# Patient Record
Sex: MALE | Race: OTHER | NOT HISPANIC OR LATINO
[De-identification: names, ages, dates, MRNs, and addresses within clinical notes are randomized per-mention and may not be internally consistent; named-entity substitution may affect disease eponyms.]

---

## 2021-01-25 ENCOUNTER — APPOINTMENT (OUTPATIENT)
Dept: INTERNAL MEDICINE | Facility: CLINIC | Age: 61
End: 2021-01-25
Payer: COMMERCIAL

## 2021-01-25 VITALS
OXYGEN SATURATION: 98 % | HEART RATE: 62 BPM | BODY MASS INDEX: 24.55 KG/M2 | WEIGHT: 162 LBS | HEIGHT: 68 IN | TEMPERATURE: 97.3 F

## 2021-01-25 VITALS — DIASTOLIC BLOOD PRESSURE: 80 MMHG | SYSTOLIC BLOOD PRESSURE: 110 MMHG

## 2021-01-25 DIAGNOSIS — N52.9 MALE ERECTILE DYSFUNCTION, UNSPECIFIED: ICD-10-CM

## 2021-01-25 PROCEDURE — 99386 PREV VISIT NEW AGE 40-64: CPT | Mod: 25

## 2021-01-25 PROCEDURE — 93000 ELECTROCARDIOGRAM COMPLETE: CPT

## 2021-01-25 PROCEDURE — 99072 ADDL SUPL MATRL&STAF TM PHE: CPT

## 2021-01-27 NOTE — REVIEW OF SYSTEMS
[Muscle Pain] : muscle pain [Joint Pain] : joint pain [Fever] : no fever [Chills] : no chills [Night Sweats] : no night sweats [Discharge] : no discharge [Chest Pain] : no chest pain [Claudication] : no  leg claudication [Shortness Of Breath] : no shortness of breath [Cough] : no cough [Abdominal Pain] : no abdominal pain [Nausea] : no nausea [Vomiting] : no vomiting [Confusion] : no confusion [Anxiety] : no anxiety [Depression] : no depression [FreeTextEntry9] : Patient complains of hip pains and right shoulder pain

## 2021-01-27 NOTE — HEALTH RISK ASSESSMENT
[Good] : ~his/her~ current health as good [No] : No [1 or 2 (0 pts)] : 1 or 2 (0 points) [Never (0 pts)] : Never (0 points) [No falls in past year] : Patient reported no falls in the past year [0] : 2) Feeling down, depressed, or hopeless: Not at all (0) [Patient reported colonoscopy was normal] : Patient reported colonoscopy was normal [None] : None [With Significant Other] : lives with significant other [# of Members in Household ___] :  household currently consist of [unfilled] member(s) [Unemployed] : unemployed [] :  [Fully functional (bathing, dressing, toileting, transferring, walking, feeding)] : Fully functional (bathing, dressing, toileting, transferring, walking, feeding) [Fully functional (using the telephone, shopping, preparing meals, housekeeping, doing laundry, using] : Fully functional and needs no help or supervision to perform IADLs (using the telephone, shopping, preparing meals, housekeeping, doing laundry, using transportation, managing medications and managing finances) [Reports changes in hearing] : Reports changes in hearing [Smoke Detector] : smoke detector [Carbon Monoxide Detector] : carbon monoxide detector [Seat Belt] :  uses seat belt [] : No [Audit-CScore] : 0 [de-identified] : Walking, weight lifting [de-identified] : None [ESK6Szupd] : 0 [Change in mental status noted] : No change in mental status noted [Language] : denies difficulty with language [Behavior] : denies difficulty with behavior [Learning/Retaining New Information] : denies difficulty learning/retaining new information [Handling Complex Tasks] : denies difficulty handling complex tasks [Reasoning] : denies difficulty with reasoning [Spatial Ability and Orientation] : denies difficulty with spatial ability and orientation [Reports changes in vision] : Reports no changes in vision [Reports normal functional visual acuity (ie: able to read med bottle)] : Reports poor functional visual acuity.  [Reports changes in dental health] : Reports no changes in dental health [Sunscreen] : does not use sunscreen [ColonoscopyComments] : Does not recall [de-identified] : Left swollen ear

## 2021-01-27 NOTE — PHYSICAL EXAM
[Well Developed] : well developed [No Acute Distress] : no acute distress [No JVD] : no jugular venous distention [No Lymphadenopathy] : no lymphadenopathy [No Respiratory Distress] : no respiratory distress  [No Accessory Muscle Use] : no accessory muscle use [Clear to Auscultation] : lungs were clear to auscultation bilaterally [Normal Rate] : normal rate  [Regular Rhythm] : with a regular rhythm [Normal S1, S2] : normal S1 and S2 [Soft] : abdomen soft [Non Tender] : non-tender [No Masses] : no abdominal mass palpated [Normal Gait] : normal gait [Alert and Oriented x3] : oriented to person, place, and time [No Edema] : there was no peripheral edema [Normal Anterior Cervical Nodes] : no anterior cervical lymphadenopathy [No Joint Swelling] : no joint swelling [No Rash] : no rash [No Focal Deficits] : no focal deficits [Normal Affect] : the affect was normal [de-identified] : limited range of motion in right shoulder

## 2021-01-27 NOTE — PLAN
[FreeTextEntry1] : -Orthopedic referrals given \par -ekg nsr no MI no st-t changes\par -Blood taken for routine labs\par -Testosterone levels to be taken before starting medication for erectile dysfunction\par

## 2021-01-27 NOTE — HISTORY OF PRESENT ILLNESS
[Other: _____] : [unfilled] [FreeTextEntry1] : 60 year old male here today for blood pressure check ans well as a few unspecific musculoskeletal complaints including left hip pain, right shoulder pain\par Patient also has complaints of erectile dysfunction  [de-identified] : 60 year old male here for blood pressure reading, erectile dysfunction

## 2021-01-27 NOTE — ASSESSMENT
[FreeTextEntry1] : 60 year old male here today for routine visit and erectile dysfunction as well as right shoulder pain \par No other acute complaints at this time \par On no medications at this time

## 2021-02-11 LAB
ALBUMIN SERPL ELPH-MCNC: 4.1 G/DL
ALP BLD-CCNC: 97 U/L
ALT SERPL-CCNC: 22 U/L
ANION GAP SERPL CALC-SCNC: 14 MMOL/L
AST SERPL-CCNC: 29 U/L
BASOPHILS # BLD AUTO: 0.08 K/UL
BASOPHILS NFR BLD AUTO: 0.9 %
BILIRUB SERPL-MCNC: 1.1 MG/DL
BUN SERPL-MCNC: 14 MG/DL
CALCIUM SERPL-MCNC: 9.5 MG/DL
CHLORIDE SERPL-SCNC: 103 MMOL/L
CHOLEST SERPL-MCNC: 168 MG/DL
CO2 SERPL-SCNC: 21 MMOL/L
CREAT SERPL-MCNC: 1.05 MG/DL
EOSINOPHIL # BLD AUTO: 0.23 K/UL
EOSINOPHIL NFR BLD AUTO: 2.5 %
ESTIMATED AVERAGE GLUCOSE: 82 MG/DL
GLUCOSE SERPL-MCNC: 84 MG/DL
HBA1C MFR BLD HPLC: 4.5 %
HCT VFR BLD CALC: 46.1 %
HDLC SERPL-MCNC: 75 MG/DL
HGB BLD-MCNC: 14.3 G/DL
IMM GRANULOCYTES NFR BLD AUTO: 0.3 %
LDLC SERPL CALC-MCNC: 79 MG/DL
LYMPHOCYTES # BLD AUTO: 2.02 K/UL
LYMPHOCYTES NFR BLD AUTO: 21.8 %
MAN DIFF?: NORMAL
MCHC RBC-ENTMCNC: 28.9 PG
MCHC RBC-ENTMCNC: 31 GM/DL
MCV RBC AUTO: 93.1 FL
MONOCYTES # BLD AUTO: 0.73 K/UL
MONOCYTES NFR BLD AUTO: 7.9 %
NEUTROPHILS # BLD AUTO: 6.19 K/UL
NEUTROPHILS NFR BLD AUTO: 66.6 %
NONHDLC SERPL-MCNC: 92 MG/DL
PLATELET # BLD AUTO: 297 K/UL
POTASSIUM SERPL-SCNC: 4.3 MMOL/L
PROT SERPL-MCNC: 7.5 G/DL
RBC # BLD: 4.95 M/UL
RBC # FLD: 13.2 %
SODIUM SERPL-SCNC: 137 MMOL/L
TESTOST SERPL-MCNC: 554 NG/DL
TRIGL SERPL-MCNC: 69 MG/DL
TSH SERPL-ACNC: 2.04 UIU/ML
WBC # FLD AUTO: 9.28 K/UL

## 2021-05-25 ENCOUNTER — OUTPATIENT (OUTPATIENT)
Dept: OUTPATIENT SERVICES | Facility: HOSPITAL | Age: 61
LOS: 1 days | End: 2021-05-25
Payer: COMMERCIAL

## 2021-05-25 ENCOUNTER — APPOINTMENT (OUTPATIENT)
Dept: ORTHOPEDIC SURGERY | Facility: CLINIC | Age: 61
End: 2021-05-25
Payer: COMMERCIAL

## 2021-05-25 ENCOUNTER — RESULT REVIEW (OUTPATIENT)
Age: 61
End: 2021-05-25

## 2021-05-25 VITALS
BODY MASS INDEX: 25.76 KG/M2 | SYSTOLIC BLOOD PRESSURE: 122 MMHG | DIASTOLIC BLOOD PRESSURE: 80 MMHG | HEIGHT: 68 IN | WEIGHT: 170 LBS

## 2021-05-25 DIAGNOSIS — Z78.9 OTHER SPECIFIED HEALTH STATUS: ICD-10-CM

## 2021-05-25 DIAGNOSIS — M16.12 UNILATERAL PRIMARY OSTEOARTHRITIS, LEFT HIP: ICD-10-CM

## 2021-05-25 DIAGNOSIS — M47.816 SPONDYLOSIS W/OUT MYELOPATHY OR RADICULOPATHY, LUMBAR REGION: ICD-10-CM

## 2021-05-25 DIAGNOSIS — Z47.1 AFTERCARE FOLLOWING JOINT REPLACEMENT SURGERY: ICD-10-CM

## 2021-05-25 DIAGNOSIS — Z96.641 AFTERCARE FOLLOWING JOINT REPLACEMENT SURGERY: ICD-10-CM

## 2021-05-25 DIAGNOSIS — Z72.3 LACK OF PHYSICAL EXERCISE: ICD-10-CM

## 2021-05-25 PROCEDURE — 99204 OFFICE O/P NEW MOD 45 MIN: CPT

## 2021-05-25 PROCEDURE — 72020 X-RAY EXAM OF SPINE 1 VIEW: CPT

## 2021-05-25 PROCEDURE — 72020 X-RAY EXAM OF SPINE 1 VIEW: CPT | Mod: 26

## 2021-05-25 PROCEDURE — 73502 X-RAY EXAM HIP UNI 2-3 VIEWS: CPT | Mod: 26,LT

## 2021-05-25 PROCEDURE — 73502 X-RAY EXAM HIP UNI 2-3 VIEWS: CPT

## 2021-05-25 NOTE — PHYSICAL EXAM
[de-identified] : General appearance: well nourished and hydrated, pleasant, alert and oriented x 3, cooperative.  \par HEENT: normocephalic, EOM intact, wearing mask, external auditory canal clear.  \par Cardiovascular: no lower leg edema, no varicosities, dorsalis pedis pulses palpable and symmetric.  \par Lymphatics: no palpable lymphadenopathy, no lymphedema.  \par Neurologic: sensation is normal, no muscle weakness in upper or lower extremities, patella tendon reflexes present and symmetric.  \par Dermatologic: skin moist, warm, no rash.  \par Spine: cervical spine with normal lordosis and painless range of motion, thoracic spine with normal kyphosis and painless range of motion, lumbosacral spine with normal lordosis and restricted range of motion.  Tenderness to palpation along midline lumbar spine and paraspinal musculature.  Sacroiliac joints nontender bilaterally. Negative SLR and crossed SLR tests bilaterally.\par Gait: slow to stand and get started. Left antalgia once going.\par \par Limb lengths: RLE about 2mm longer than LLE.\par \par Left hip:\par - Swelling: none\par - Ecchymosis: none\par - Erythema: none\par - Wounds: none\par - Tenderness: anterior and mild lateral\par - ROM: 70 flexion, 0 extension, 0 adduction, 20 abduction, 5 obligate external rotation, 30 further external rotation\par - ALPHONSO: painful\par - FADIR: painful\par - Maritza: positive\par - Stinchfield: positive\par - Flexor power: 4+/5\par - Abductor power: 5/5\par \par Right hip:\par - Swelling: none\par - Ecchymosis: none\par - Erythema: none\par - Wounds: healed posterolateral incision\par - Tenderness: mild lateral\par - ROM: 90 flexion, 0 extension, 5 adduction, 20 abduction, 10 internal rotation, 25 external rotation\par - ALPHONSO: stiff but painless\par - FADIR: stiff but painless\par - Maritza: negative\par - Stinchfield: a little uncomfortable laterally\par - Flexor power: 5/5\par - Abductor power: 5/5 [de-identified] : A lateral view of the lumbosacral spine, weightbearing AP pelvis, and 2 additional views (frog lateral and false profile) of the bilateral hips were obtained today, interpreted by me, and reviewed with the patient.\par \par Lumbar spine --\par Alignment: normal\par Spondylosis: multilevel, worst at L5/S1 with apparent bone loss of the posterosuperior corner of S1\par Listhesis: none\par Posterior elements: multilevel facet arthrosis worst at the caudal levels\par \par Pelvic alignment: normal\par \par Right hip -- cementless WENCESLAO in position. All components appear to be well fixed in reasonable alignment without evidence of complication. Non-bridging heterotopic ossification along the abductors.\par \par Left hip --\par Alignment: normal\par Arthritis: severe with notable acetabular sclerosis\par Deformity: none\par Osteonecrosis: sclerotic changes on both sides of the joint, some superior femoral head collapse also noted

## 2021-05-25 NOTE — DISCUSSION/SUMMARY
[de-identified] : 62y/o male with stiff but well-functioning right total hip arthroplasty, severe left hip osteoarthritis\par - He is indicated for left total hip arthroplasty\par - We discussed the details of the procedure, the expected recovery period, and the expected outcome. We discussed the likelihood of satisfaction after complete recovery, and the potential causes of dissatisfaction. The importance of active patient participation in the rehabilitation protocol was emphasized, along with its influence on short and long-term outcomes. We discussed the risks, benefits, and alternatives of surgery at length. Specific risks of total hip replacement were discussed in detail. We discussed the risk of surgical site complications including but not limited to: surgical site infection, wound healing complications, bone fracture, tendon or ligament injury, neurovascular injury, hemorrhage, postoperative stiffness or instability, persistent pain, limb length discrepancy, and need for reoperation. We discussed surgical blood loss and the possible need for blood transfusion. We discussed the risk of perioperative medical complications, including but not limited to catheter-associated urinary tract infection, venous thromboembolism and other cardiopulmonary complications. We discussed anesthetic options and the risk of anesthesia-related complications. We discussed the potential benefits of surgery including the potential to improve the current clinical condition through operative intervention. I emphasized that there are alternatives to surgical intervention including continued conservative management, though such a course could yield less than optimal results in this particular patient. A model was used to demonstrate the operation and to discuss bearing surfaces of the implants. We discussed implant fixation methods; my plan would be to use fully cementless fixation in this case. We discussed the various surgical approaches to the hip; I think that an anterior approach would be appropriate in this case. We discussed the durability of prosthetic hips and limitations related to wear, osteolysis and loosening. All questions were answered to the patient's satisfaction. The patient was given a copy of my preoperative packet with additional information about the procedure. I asked the patient to either call back or schedule a followup appointment for any additional questions or concerns regarding the procedure.\par - His acetabular sclerosis probably calls for line to line reaming and supplemental screw fixation\par - Book for surgery at a convenient time; he wishes to have it done ASAP\par - Standard medical clearance\par - He has been COVID-19 vaccinated\par - He is requesting that all the surgical instruments be covered when he enters the room

## 2021-05-25 NOTE — HISTORY OF PRESENT ILLNESS
[___ yrs] : [unfilled] year(s) ago [7] : a current pain level of 7/10 [Constant] : ~He/She~ states the symptoms seem to be constant [Bending] : worsened by bending [Walking] : worsened by walking [Heat] : relieved by heat [Ice] : relieved by ice [Rest] : relieved by rest [de-identified] : 62y/o male presenting for evaluation of left hip osteoarthritis. Symptoms have been progressive for the past 3 years. Reports that his exercise tolerance is 30+ blocks but he limps constantly and had pain at all times. Refuses to take pain medications, does not want to mask the symptoms. Right hip was replaced in 2007 and is mildly sore from time to time but overall functioning well. He has done PT intermittently after the right hip surgery but doesn't feel that it does anything for the left hip. 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 He would like to book left WENCESLOA as soon as possible.\par \par Denies PMH. He reports feeling like the right leg is shorter than the left. [de-identified] : describes radiating dull/achy/stabbing pain

## 2021-06-01 ENCOUNTER — OUTPATIENT (OUTPATIENT)
Dept: OUTPATIENT SERVICES | Facility: HOSPITAL | Age: 61
LOS: 1 days | End: 2021-06-01
Payer: COMMERCIAL

## 2021-06-01 ENCOUNTER — RESULT REVIEW (OUTPATIENT)
Age: 61
End: 2021-06-01

## 2021-06-01 DIAGNOSIS — Z01.818 ENCOUNTER FOR OTHER PREPROCEDURAL EXAMINATION: ICD-10-CM

## 2021-06-01 LAB
A1C WITH ESTIMATED AVERAGE GLUCOSE RESULT: 4.7 % — SIGNIFICANT CHANGE UP (ref 4–5.6)
ANION GAP SERPL CALC-SCNC: 13 MMOL/L — SIGNIFICANT CHANGE UP (ref 5–17)
APPEARANCE UR: CLEAR — SIGNIFICANT CHANGE UP
APTT BLD: 27.7 SEC — SIGNIFICANT CHANGE UP (ref 27.5–35.5)
BASOPHILS # BLD AUTO: 0.08 K/UL — SIGNIFICANT CHANGE UP (ref 0–0.2)
BASOPHILS NFR BLD AUTO: 0.9 % — SIGNIFICANT CHANGE UP (ref 0–2)
BILIRUB UR-MCNC: NEGATIVE — SIGNIFICANT CHANGE UP
BUN SERPL-MCNC: 20 MG/DL — SIGNIFICANT CHANGE UP (ref 7–23)
CALCIUM SERPL-MCNC: 9.9 MG/DL — SIGNIFICANT CHANGE UP (ref 8.4–10.5)
CHLORIDE SERPL-SCNC: 103 MMOL/L — SIGNIFICANT CHANGE UP (ref 96–108)
CO2 SERPL-SCNC: 26 MMOL/L — SIGNIFICANT CHANGE UP (ref 22–31)
COLOR SPEC: YELLOW — SIGNIFICANT CHANGE UP
CREAT SERPL-MCNC: 1.01 MG/DL — SIGNIFICANT CHANGE UP (ref 0.5–1.3)
DIFF PNL FLD: NEGATIVE — SIGNIFICANT CHANGE UP
EOSINOPHIL # BLD AUTO: 0.22 K/UL — SIGNIFICANT CHANGE UP (ref 0–0.5)
EOSINOPHIL NFR BLD AUTO: 2.6 % — SIGNIFICANT CHANGE UP (ref 0–6)
ESTIMATED AVERAGE GLUCOSE: 88 MG/DL — SIGNIFICANT CHANGE UP (ref 68–114)
GLUCOSE SERPL-MCNC: 73 MG/DL — SIGNIFICANT CHANGE UP (ref 70–99)
GLUCOSE UR QL: NEGATIVE — SIGNIFICANT CHANGE UP
HCT VFR BLD CALC: 47.1 % — SIGNIFICANT CHANGE UP (ref 39–50)
HGB BLD-MCNC: 14.5 G/DL — SIGNIFICANT CHANGE UP (ref 13–17)
IMM GRANULOCYTES NFR BLD AUTO: 0.5 % — SIGNIFICANT CHANGE UP (ref 0–1.5)
INR BLD: 0.96 — SIGNIFICANT CHANGE UP (ref 0.88–1.16)
KETONES UR-MCNC: NEGATIVE — SIGNIFICANT CHANGE UP
LEUKOCYTE ESTERASE UR-ACNC: NEGATIVE — SIGNIFICANT CHANGE UP
LYMPHOCYTES # BLD AUTO: 1.73 K/UL — SIGNIFICANT CHANGE UP (ref 1–3.3)
LYMPHOCYTES # BLD AUTO: 20.4 % — SIGNIFICANT CHANGE UP (ref 13–44)
MCHC RBC-ENTMCNC: 28.4 PG — SIGNIFICANT CHANGE UP (ref 27–34)
MCHC RBC-ENTMCNC: 30.8 GM/DL — LOW (ref 32–36)
MCV RBC AUTO: 92.2 FL — SIGNIFICANT CHANGE UP (ref 80–100)
MONOCYTES # BLD AUTO: 0.72 K/UL — SIGNIFICANT CHANGE UP (ref 0–0.9)
MONOCYTES NFR BLD AUTO: 8.5 % — SIGNIFICANT CHANGE UP (ref 2–14)
NEUTROPHILS # BLD AUTO: 5.69 K/UL — SIGNIFICANT CHANGE UP (ref 1.8–7.4)
NEUTROPHILS NFR BLD AUTO: 67.1 % — SIGNIFICANT CHANGE UP (ref 43–77)
NITRITE UR-MCNC: NEGATIVE — SIGNIFICANT CHANGE UP
NRBC # BLD: 0 /100 WBCS — SIGNIFICANT CHANGE UP (ref 0–0)
PH UR: 6 — SIGNIFICANT CHANGE UP (ref 5–8)
PLATELET # BLD AUTO: 288 K/UL — SIGNIFICANT CHANGE UP (ref 150–400)
POTASSIUM SERPL-MCNC: 4.4 MMOL/L — SIGNIFICANT CHANGE UP (ref 3.5–5.3)
POTASSIUM SERPL-SCNC: 4.4 MMOL/L — SIGNIFICANT CHANGE UP (ref 3.5–5.3)
PROT UR-MCNC: NEGATIVE MG/DL — SIGNIFICANT CHANGE UP
PROTHROM AB SERPL-ACNC: 11.5 SEC — SIGNIFICANT CHANGE UP (ref 10.6–13.6)
RBC # BLD: 5.11 M/UL — SIGNIFICANT CHANGE UP (ref 4.2–5.8)
RBC # FLD: 12.7 % — SIGNIFICANT CHANGE UP (ref 10.3–14.5)
SODIUM SERPL-SCNC: 142 MMOL/L — SIGNIFICANT CHANGE UP (ref 135–145)
SP GR SPEC: 1.02 — SIGNIFICANT CHANGE UP (ref 1–1.03)
UROBILINOGEN FLD QL: 0.2 E.U./DL — SIGNIFICANT CHANGE UP
WBC # BLD: 8.48 K/UL — SIGNIFICANT CHANGE UP (ref 3.8–10.5)
WBC # FLD AUTO: 8.48 K/UL — SIGNIFICANT CHANGE UP (ref 3.8–10.5)

## 2021-06-01 PROCEDURE — 71046 X-RAY EXAM CHEST 2 VIEWS: CPT

## 2021-06-01 PROCEDURE — 85610 PROTHROMBIN TIME: CPT

## 2021-06-01 PROCEDURE — 83036 HEMOGLOBIN GLYCOSYLATED A1C: CPT

## 2021-06-01 PROCEDURE — 93010 ELECTROCARDIOGRAM REPORT: CPT

## 2021-06-01 PROCEDURE — 80048 BASIC METABOLIC PNL TOTAL CA: CPT

## 2021-06-01 PROCEDURE — 85025 COMPLETE CBC W/AUTO DIFF WBC: CPT

## 2021-06-01 PROCEDURE — 85730 THROMBOPLASTIN TIME PARTIAL: CPT

## 2021-06-01 PROCEDURE — 71046 X-RAY EXAM CHEST 2 VIEWS: CPT | Mod: 26

## 2021-06-01 PROCEDURE — 87086 URINE CULTURE/COLONY COUNT: CPT

## 2021-06-01 PROCEDURE — 81003 URINALYSIS AUTO W/O SCOPE: CPT

## 2021-06-01 PROCEDURE — 93005 ELECTROCARDIOGRAM TRACING: CPT

## 2021-06-02 LAB
CULTURE RESULTS: NO GROWTH — SIGNIFICANT CHANGE UP
SPECIMEN SOURCE: SIGNIFICANT CHANGE UP

## 2021-06-03 ENCOUNTER — APPOINTMENT (OUTPATIENT)
Dept: INTERNAL MEDICINE | Facility: CLINIC | Age: 61
End: 2021-06-03
Payer: COMMERCIAL

## 2021-06-03 VITALS
HEART RATE: 78 BPM | OXYGEN SATURATION: 96 % | TEMPERATURE: 97.6 F | BODY MASS INDEX: 27.58 KG/M2 | WEIGHT: 182 LBS | HEIGHT: 68 IN

## 2021-06-03 VITALS — SYSTOLIC BLOOD PRESSURE: 130 MMHG | DIASTOLIC BLOOD PRESSURE: 80 MMHG

## 2021-06-03 PROCEDURE — 99215 OFFICE O/P EST HI 40 MIN: CPT

## 2021-06-03 NOTE — HISTORY OF PRESENT ILLNESS
[(Patient denies any chest pain, claudication, dyspnea on exertion, orthopnea, palpitations or syncope)] : Patient denies any chest pain, claudication, dyspnea on exertion, orthopnea, palpitations or syncope [Good (7-10 METs)] : Good (7-10 METs) [Aortic Stenosis] : no aortic stenosis [Atrial Fibrillation] : no atrial fibrillation [Coronary Artery Disease] : no coronary artery disease [Implantable Device/Pacemaker] : no implantable device/pacemaker [Asthma] : no asthma [COPD] : no COPD [Sleep Apnea] : no sleep apnea [Smoker] : not a smoker [Family Member] : no family member with adverse anesthesia reaction/sudden death [Self] : no previous adverse anesthesia reaction [Chronic Anticoagulation] : no chronic anticoagulation [Chronic Kidney Disease] : no chronic kidney disease [FreeTextEntry1] : Hip replacement left side [FreeTextEntry2] : 06/10/2021 [FreeTextEntry3] : Dr. Jefferson FREEMAN  [FreeTextEntry4] : needs clearance prior to L hip replacement\par seen by ortho and xray R hip documents severe OA  [FreeTextEntry7] : ekg NSR non specific st-t changes.

## 2021-06-03 NOTE — PHYSICAL EXAM
[No Acute Distress] : no acute distress [No JVD] : no jugular venous distention [No Respiratory Distress] : no respiratory distress  [Normal Rate] : normal rate  [No Murmur] : no murmur heard [No Edema] : there was no peripheral edema [Soft] : abdomen soft [Non Tender] : non-tender [Non-distended] : non-distended [No CVA Tenderness] : no CVA  tenderness [No Joint Swelling] : no joint swelling [No Rash] : no rash [Normal Affect] : the affect was normal [Alert and Oriented x3] : oriented to person, place, and time

## 2021-06-09 ENCOUNTER — TRANSCRIPTION ENCOUNTER (OUTPATIENT)
Age: 61
End: 2021-06-09

## 2021-06-09 RX ORDER — POVIDONE-IODINE 5 %
1 AEROSOL (ML) TOPICAL ONCE
Refills: 0 | Status: COMPLETED | OUTPATIENT
Start: 2021-06-10 | End: 2021-06-10

## 2021-06-09 NOTE — H&P ADULT - HISTORY OF PRESENT ILLNESS
61M c/o left hip pain  Presents today for elective Left THR. 61M c/o left hip pain    History of right total hip replacement.    Presents today for elective Left THR. 61M c/o left hip pain x chronic without preceding accident, injury or trauma. The patient complains of left hip pain, denies numbness/tingling. Failed conservative therapies for his symptoms. Ambulates without assistive walking devices. Denies hx of DVT.   Presents today for elective Left total hip replacement.

## 2021-06-09 NOTE — H&P ADULT - NSHPPHYSICALEXAM_GEN_ALL_CORE
Left hip decreased rom 2/2 pain   Rest of PE per MD clearance PE:  MSK: Skin warm and well perfused. DP pulses palpable bilateral lower extremities. Sensation intact and equal bilateral lower extremities. EHL/TA/GS/FHL 5/5 bilateral lower extremities. Left hip decreased rom 2/2 pain   Rest of PE per MD clearance

## 2021-06-09 NOTE — H&P ADULT - NSHPLABSRESULTS_GEN_ALL_CORE
Preop CBC, BMP, PT/INR, PTT within normal range  Cr- 1.01  COVID Vaccine: Moderna x 2  UA negative   Preop CXR within normal limits, reviewed per medical clearance   Preop EKG NSR, nonspecific ST-T abnormalities, and reviewed per medical clearance

## 2021-06-09 NOTE — H&P ADULT - PROBLEM SELECTOR PLAN 1
Admit to Ortho  For elective Left THR  Cleared by Admit to Ortho  For elective Left THR  Cleared by Dr. Arredondo Admit to Orthopedic service   For elective Left THR  Cleared by Dr. Arredondo

## 2021-06-09 NOTE — H&P ADULT - NSICDXPASTMEDICALHX_GEN_ALL_CORE_FT
PAST MEDICAL HISTORY:  ED (erectile dysfunction)     Lumbar spondylolysis     Osteoarthritis of hip left

## 2021-06-10 ENCOUNTER — APPOINTMENT (OUTPATIENT)
Dept: ORTHOPEDIC SURGERY | Facility: CLINIC | Age: 61
End: 2021-06-10

## 2021-06-10 ENCOUNTER — INPATIENT (INPATIENT)
Facility: HOSPITAL | Age: 61
LOS: 0 days | Discharge: HOME HEALTH | DRG: 470 | End: 2021-06-11
Attending: ORTHOPAEDIC SURGERY | Admitting: ORTHOPAEDIC SURGERY
Payer: COMMERCIAL

## 2021-06-10 VITALS
WEIGHT: 175.93 LBS | DIASTOLIC BLOOD PRESSURE: 76 MMHG | OXYGEN SATURATION: 96 % | TEMPERATURE: 98 F | RESPIRATION RATE: 17 BRPM | HEIGHT: 68 IN | HEART RATE: 67 BPM | SYSTOLIC BLOOD PRESSURE: 124 MMHG

## 2021-06-10 DIAGNOSIS — Z98.890 OTHER SPECIFIED POSTPROCEDURAL STATES: Chronic | ICD-10-CM

## 2021-06-10 DIAGNOSIS — M16.12 UNILATERAL PRIMARY OSTEOARTHRITIS, LEFT HIP: ICD-10-CM

## 2021-06-10 LAB
BASE EXCESS BLDA CALC-SCNC: -2.6 MMOL/L — LOW (ref -2–3)
CA-I BLDA-SCNC: 1.26 MMOL/L — SIGNIFICANT CHANGE UP (ref 1.15–1.33)
CO2 BLDA-SCNC: 28 MMOL/L — HIGH (ref 19–24)
COHGB MFR BLDA: 1.4 % — SIGNIFICANT CHANGE UP
GLUCOSE BLDC GLUCOMTR-MCNC: 105 MG/DL — HIGH (ref 70–99)
HCO3 BLDA-SCNC: 26 MMOL/L — SIGNIFICANT CHANGE UP (ref 21–28)
HCT VFR BLD CALC: 37.5 % — LOW (ref 39–50)
HGB BLD-MCNC: 11.7 G/DL — LOW (ref 13–17)
HGB BLDA-MCNC: 12.2 G/DL — LOW (ref 12.6–17.4)
MCHC RBC-ENTMCNC: 28.7 PG — SIGNIFICANT CHANGE UP (ref 27–34)
MCHC RBC-ENTMCNC: 31.2 GM/DL — LOW (ref 32–36)
MCV RBC AUTO: 91.9 FL — SIGNIFICANT CHANGE UP (ref 80–100)
METHGB MFR BLDA: 0.8 % — SIGNIFICANT CHANGE UP
NRBC # BLD: 0 /100 WBCS — SIGNIFICANT CHANGE UP (ref 0–0)
OXYHGB MFR BLDA: 96 % — HIGH (ref 90–95)
PCO2 BLDA: 60 MMHG — HIGH (ref 35–48)
PH BLDA: 7.24 — LOW (ref 7.35–7.45)
PLATELET # BLD AUTO: 209 K/UL — SIGNIFICANT CHANGE UP (ref 150–400)
PO2 BLDA: 108 MMHG — SIGNIFICANT CHANGE UP (ref 83–108)
POTASSIUM BLDA-SCNC: 4.2 MMOL/L — SIGNIFICANT CHANGE UP (ref 3.5–5.1)
RBC # BLD: 4.08 M/UL — LOW (ref 4.2–5.8)
RBC # FLD: 12.6 % — SIGNIFICANT CHANGE UP (ref 10.3–14.5)
SAO2 % BLDA: 98.2 % — HIGH (ref 94–98)
SODIUM BLDA-SCNC: 137 MMOL/L — SIGNIFICANT CHANGE UP (ref 136–145)
WBC # BLD: 10.66 K/UL — HIGH (ref 3.8–10.5)
WBC # FLD AUTO: 10.66 K/UL — HIGH (ref 3.8–10.5)

## 2021-06-10 PROCEDURE — 27130 TOTAL HIP ARTHROPLASTY: CPT | Mod: LT

## 2021-06-10 RX ORDER — GABAPENTIN 400 MG/1
600 CAPSULE ORAL ONCE
Refills: 0 | Status: COMPLETED | OUTPATIENT
Start: 2021-06-10 | End: 2021-06-10

## 2021-06-10 RX ORDER — CELECOXIB 200 MG/1
200 CAPSULE ORAL EVERY 12 HOURS
Refills: 0 | Status: DISCONTINUED | OUTPATIENT
Start: 2021-06-11 | End: 2021-06-11

## 2021-06-10 RX ORDER — CELECOXIB 200 MG/1
400 CAPSULE ORAL ONCE
Refills: 0 | Status: COMPLETED | OUTPATIENT
Start: 2021-06-10 | End: 2021-06-10

## 2021-06-10 RX ORDER — CHLORHEXIDINE GLUCONATE 213 G/1000ML
1 SOLUTION TOPICAL ONCE
Refills: 0 | Status: COMPLETED | OUTPATIENT
Start: 2021-06-10 | End: 2021-06-10

## 2021-06-10 RX ORDER — PROCHLORPERAZINE MALEATE 5 MG
5 TABLET ORAL ONCE
Refills: 0 | Status: DISCONTINUED | OUTPATIENT
Start: 2021-06-10 | End: 2021-06-11

## 2021-06-10 RX ORDER — PANTOPRAZOLE SODIUM 20 MG/1
40 TABLET, DELAYED RELEASE ORAL
Refills: 0 | Status: DISCONTINUED | OUTPATIENT
Start: 2021-06-10 | End: 2021-06-11

## 2021-06-10 RX ORDER — APREPITANT 80 MG/1
40 CAPSULE ORAL ONCE
Refills: 0 | Status: COMPLETED | OUTPATIENT
Start: 2021-06-10 | End: 2021-06-10

## 2021-06-10 RX ORDER — OXYCODONE HYDROCHLORIDE 5 MG/1
5 TABLET ORAL EVERY 4 HOURS
Refills: 0 | Status: DISCONTINUED | OUTPATIENT
Start: 2021-06-10 | End: 2021-06-11

## 2021-06-10 RX ORDER — ONDANSETRON 8 MG/1
4 TABLET, FILM COATED ORAL EVERY 6 HOURS
Refills: 0 | Status: DISCONTINUED | OUTPATIENT
Start: 2021-06-10 | End: 2021-06-11

## 2021-06-10 RX ORDER — CEFAZOLIN SODIUM 1 G
2000 VIAL (EA) INJECTION EVERY 8 HOURS
Refills: 0 | Status: DISCONTINUED | OUTPATIENT
Start: 2021-06-10 | End: 2021-06-10

## 2021-06-10 RX ORDER — ACETAMINOPHEN 500 MG
1000 TABLET ORAL ONCE
Refills: 0 | Status: COMPLETED | OUTPATIENT
Start: 2021-06-10 | End: 2021-06-10

## 2021-06-10 RX ORDER — HYDROMORPHONE HYDROCHLORIDE 2 MG/ML
0.5 INJECTION INTRAMUSCULAR; INTRAVENOUS; SUBCUTANEOUS EVERY 4 HOURS
Refills: 0 | Status: DISCONTINUED | OUTPATIENT
Start: 2021-06-10 | End: 2021-06-11

## 2021-06-10 RX ORDER — OXYCODONE HYDROCHLORIDE 5 MG/1
10 TABLET ORAL EVERY 4 HOURS
Refills: 0 | Status: DISCONTINUED | OUTPATIENT
Start: 2021-06-10 | End: 2021-06-11

## 2021-06-10 RX ORDER — SODIUM CHLORIDE 9 MG/ML
500 INJECTION, SOLUTION INTRAVENOUS ONCE
Refills: 0 | Status: COMPLETED | OUTPATIENT
Start: 2021-06-10 | End: 2021-06-10

## 2021-06-10 RX ORDER — POLYETHYLENE GLYCOL 3350 17 G/17G
17 POWDER, FOR SOLUTION ORAL AT BEDTIME
Refills: 0 | Status: DISCONTINUED | OUTPATIENT
Start: 2021-06-10 | End: 2021-06-11

## 2021-06-10 RX ORDER — BUPIVACAINE 13.3 MG/ML
20 INJECTION, SUSPENSION, LIPOSOMAL INFILTRATION ONCE
Refills: 0 | Status: DISCONTINUED | OUTPATIENT
Start: 2021-06-10 | End: 2021-06-11

## 2021-06-10 RX ORDER — ASPIRIN/CALCIUM CARB/MAGNESIUM 324 MG
325 TABLET ORAL DAILY
Refills: 0 | Status: DISCONTINUED | OUTPATIENT
Start: 2021-06-10 | End: 2021-06-11

## 2021-06-10 RX ORDER — SENNA PLUS 8.6 MG/1
2 TABLET ORAL AT BEDTIME
Refills: 0 | Status: DISCONTINUED | OUTPATIENT
Start: 2021-06-10 | End: 2021-06-11

## 2021-06-10 RX ORDER — SODIUM CHLORIDE 9 MG/ML
1000 INJECTION, SOLUTION INTRAVENOUS
Refills: 0 | Status: DISCONTINUED | OUTPATIENT
Start: 2021-06-11 | End: 2021-06-11

## 2021-06-10 RX ORDER — HYDROMORPHONE HYDROCHLORIDE 2 MG/ML
0.5 INJECTION INTRAMUSCULAR; INTRAVENOUS; SUBCUTANEOUS
Refills: 0 | Status: COMPLETED | OUTPATIENT
Start: 2021-06-10 | End: 2021-06-10

## 2021-06-10 RX ORDER — ACETAMINOPHEN 500 MG
975 TABLET ORAL EVERY 8 HOURS
Refills: 0 | Status: DISCONTINUED | OUTPATIENT
Start: 2021-06-10 | End: 2021-06-11

## 2021-06-10 RX ORDER — CEFAZOLIN SODIUM 1 G
2000 VIAL (EA) INJECTION EVERY 8 HOURS
Refills: 0 | Status: COMPLETED | OUTPATIENT
Start: 2021-06-10 | End: 2021-06-10

## 2021-06-10 RX ADMIN — OXYCODONE HYDROCHLORIDE 10 MILLIGRAM(S): 5 TABLET ORAL at 23:10

## 2021-06-10 RX ADMIN — Medication 2000 MILLIGRAM(S): at 14:58

## 2021-06-10 RX ADMIN — Medication 2000 MILLIGRAM(S): at 23:00

## 2021-06-10 RX ADMIN — HYDROMORPHONE HYDROCHLORIDE 0.5 MILLIGRAM(S): 2 INJECTION INTRAMUSCULAR; INTRAVENOUS; SUBCUTANEOUS at 13:52

## 2021-06-10 RX ADMIN — APREPITANT 40 MILLIGRAM(S): 80 CAPSULE ORAL at 07:06

## 2021-06-10 RX ADMIN — Medication 1000 MILLIGRAM(S): at 07:06

## 2021-06-10 RX ADMIN — SODIUM CHLORIDE 1000 MILLILITER(S): 9 INJECTION, SOLUTION INTRAVENOUS at 11:52

## 2021-06-10 RX ADMIN — CELECOXIB 400 MILLIGRAM(S): 200 CAPSULE ORAL at 07:05

## 2021-06-10 RX ADMIN — CELECOXIB 400 MILLIGRAM(S): 200 CAPSULE ORAL at 07:06

## 2021-06-10 RX ADMIN — CHLORHEXIDINE GLUCONATE 1 APPLICATION(S): 213 SOLUTION TOPICAL at 06:20

## 2021-06-10 RX ADMIN — HYDROMORPHONE HYDROCHLORIDE 0.5 MILLIGRAM(S): 2 INJECTION INTRAMUSCULAR; INTRAVENOUS; SUBCUTANEOUS at 13:37

## 2021-06-10 RX ADMIN — GABAPENTIN 600 MILLIGRAM(S): 400 CAPSULE ORAL at 07:05

## 2021-06-10 RX ADMIN — Medication 1 APPLICATION(S): at 06:45

## 2021-06-10 RX ADMIN — OXYCODONE HYDROCHLORIDE 10 MILLIGRAM(S): 5 TABLET ORAL at 22:10

## 2021-06-10 NOTE — PHYSICAL THERAPY INITIAL EVALUATION ADULT - PERTINENT HX OF CURRENT PROBLEM, REHAB EVAL
61M c/o left hip pain x chronic without preceding accident, injury or trauma. The patient complains of left hip pain, denies numbness/tingling. Failed conservative therapies for his symptoms. Ambulates without assistive walking devices. Denies hx of DVT.

## 2021-06-10 NOTE — PHYSICAL THERAPY INITIAL EVALUATION ADULT - ADDITIONAL COMMENTS
Pt lives in an elevator access apartment with Wife and has no steps to enter. Pt denies use of DME prior to sx.

## 2021-06-11 ENCOUNTER — TRANSCRIPTION ENCOUNTER (OUTPATIENT)
Age: 61
End: 2021-06-11

## 2021-06-11 ENCOUNTER — NON-APPOINTMENT (OUTPATIENT)
Age: 61
End: 2021-06-11

## 2021-06-11 VITALS — DIASTOLIC BLOOD PRESSURE: 76 MMHG | OXYGEN SATURATION: 100 % | SYSTOLIC BLOOD PRESSURE: 128 MMHG | HEART RATE: 79 BPM

## 2021-06-11 LAB
ANION GAP SERPL CALC-SCNC: 10 MMOL/L — SIGNIFICANT CHANGE UP (ref 5–17)
BUN SERPL-MCNC: 16 MG/DL — SIGNIFICANT CHANGE UP (ref 7–23)
CALCIUM SERPL-MCNC: 8.8 MG/DL — SIGNIFICANT CHANGE UP (ref 8.4–10.5)
CHLORIDE SERPL-SCNC: 105 MMOL/L — SIGNIFICANT CHANGE UP (ref 96–108)
CO2 SERPL-SCNC: 22 MMOL/L — SIGNIFICANT CHANGE UP (ref 22–31)
COVID-19 SPIKE DOMAIN AB INTERP: POSITIVE
COVID-19 SPIKE DOMAIN ANTIBODY RESULT: >250 U/ML — HIGH
CREAT SERPL-MCNC: 0.83 MG/DL — SIGNIFICANT CHANGE UP (ref 0.5–1.3)
GLUCOSE SERPL-MCNC: 132 MG/DL — HIGH (ref 70–99)
HCT VFR BLD CALC: 33 % — LOW (ref 39–50)
HCV AB S/CO SERPL IA: 0.1 S/CO — SIGNIFICANT CHANGE UP
HCV AB SERPL-IMP: SIGNIFICANT CHANGE UP
HGB BLD-MCNC: 10.5 G/DL — LOW (ref 13–17)
MCHC RBC-ENTMCNC: 28.8 PG — SIGNIFICANT CHANGE UP (ref 27–34)
MCHC RBC-ENTMCNC: 31.8 GM/DL — LOW (ref 32–36)
MCV RBC AUTO: 90.7 FL — SIGNIFICANT CHANGE UP (ref 80–100)
NRBC # BLD: 0 /100 WBCS — SIGNIFICANT CHANGE UP (ref 0–0)
PLATELET # BLD AUTO: 192 K/UL — SIGNIFICANT CHANGE UP (ref 150–400)
POTASSIUM SERPL-MCNC: 5 MMOL/L — SIGNIFICANT CHANGE UP (ref 3.5–5.3)
POTASSIUM SERPL-SCNC: 5 MMOL/L — SIGNIFICANT CHANGE UP (ref 3.5–5.3)
RBC # BLD: 3.64 M/UL — LOW (ref 4.2–5.8)
RBC # FLD: 12.5 % — SIGNIFICANT CHANGE UP (ref 10.3–14.5)
SARS-COV-2 IGG+IGM SERPL QL IA: >250 U/ML — HIGH
SARS-COV-2 IGG+IGM SERPL QL IA: POSITIVE
SODIUM SERPL-SCNC: 137 MMOL/L — SIGNIFICANT CHANGE UP (ref 135–145)
WBC # BLD: 17.52 K/UL — HIGH (ref 3.8–10.5)
WBC # FLD AUTO: 17.52 K/UL — HIGH (ref 3.8–10.5)

## 2021-06-11 PROCEDURE — 84132 ASSAY OF SERUM POTASSIUM: CPT

## 2021-06-11 PROCEDURE — 76000 FLUOROSCOPY <1 HR PHYS/QHP: CPT

## 2021-06-11 PROCEDURE — 85018 HEMOGLOBIN: CPT

## 2021-06-11 PROCEDURE — 86803 HEPATITIS C AB TEST: CPT

## 2021-06-11 PROCEDURE — 36415 COLL VENOUS BLD VENIPUNCTURE: CPT

## 2021-06-11 PROCEDURE — 86901 BLOOD TYPING SEROLOGIC RH(D): CPT

## 2021-06-11 PROCEDURE — 80048 BASIC METABOLIC PNL TOTAL CA: CPT

## 2021-06-11 PROCEDURE — 97161 PT EVAL LOW COMPLEX 20 MIN: CPT

## 2021-06-11 PROCEDURE — 84295 ASSAY OF SERUM SODIUM: CPT

## 2021-06-11 PROCEDURE — 86900 BLOOD TYPING SEROLOGIC ABO: CPT

## 2021-06-11 PROCEDURE — 86769 SARS-COV-2 COVID-19 ANTIBODY: CPT

## 2021-06-11 PROCEDURE — C1776: CPT

## 2021-06-11 PROCEDURE — 97116 GAIT TRAINING THERAPY: CPT

## 2021-06-11 PROCEDURE — 82962 GLUCOSE BLOOD TEST: CPT

## 2021-06-11 PROCEDURE — C1713: CPT

## 2021-06-11 PROCEDURE — 82330 ASSAY OF CALCIUM: CPT

## 2021-06-11 PROCEDURE — 85027 COMPLETE CBC AUTOMATED: CPT

## 2021-06-11 PROCEDURE — 86850 RBC ANTIBODY SCREEN: CPT

## 2021-06-11 RX ORDER — PANTOPRAZOLE SODIUM 20 MG/1
40 TABLET, DELAYED RELEASE ORAL
Refills: 0 | Status: DISCONTINUED | OUTPATIENT
Start: 2021-06-11 | End: 2021-06-11

## 2021-06-11 RX ORDER — ASPIRIN/CALCIUM CARB/MAGNESIUM 324 MG
81 TABLET ORAL
Refills: 0 | Status: DISCONTINUED | OUTPATIENT
Start: 2021-06-11 | End: 2021-06-11

## 2021-06-11 RX ORDER — MAGNESIUM HYDROXIDE 400 MG/1
30 TABLET, CHEWABLE ORAL DAILY
Refills: 0 | Status: DISCONTINUED | OUTPATIENT
Start: 2021-06-11 | End: 2021-06-11

## 2021-06-11 RX ORDER — HYDROMORPHONE HYDROCHLORIDE 2 MG/ML
0.5 INJECTION INTRAMUSCULAR; INTRAVENOUS; SUBCUTANEOUS EVERY 4 HOURS
Refills: 0 | Status: DISCONTINUED | OUTPATIENT
Start: 2021-06-11 | End: 2021-06-11

## 2021-06-11 RX ORDER — OXYCODONE HYDROCHLORIDE 5 MG/1
10 TABLET ORAL EVERY 4 HOURS
Refills: 0 | Status: DISCONTINUED | OUTPATIENT
Start: 2021-06-11 | End: 2021-06-11

## 2021-06-11 RX ORDER — PROCHLORPERAZINE MALEATE 5 MG
5 TABLET ORAL ONCE
Refills: 0 | Status: DISCONTINUED | OUTPATIENT
Start: 2021-06-11 | End: 2021-06-11

## 2021-06-11 RX ORDER — ONDANSETRON 8 MG/1
4 TABLET, FILM COATED ORAL EVERY 6 HOURS
Refills: 0 | Status: DISCONTINUED | OUTPATIENT
Start: 2021-06-11 | End: 2021-06-11

## 2021-06-11 RX ORDER — ACETAMINOPHEN 500 MG
975 TABLET ORAL EVERY 8 HOURS
Refills: 0 | Status: DISCONTINUED | OUTPATIENT
Start: 2021-06-11 | End: 2021-06-11

## 2021-06-11 RX ORDER — POLYETHYLENE GLYCOL 3350 17 G/17G
17 POWDER, FOR SOLUTION ORAL AT BEDTIME
Refills: 0 | Status: DISCONTINUED | OUTPATIENT
Start: 2021-06-11 | End: 2021-06-11

## 2021-06-11 RX ORDER — ACETAMINOPHEN 500 MG
3 TABLET ORAL
Qty: 0 | Refills: 0 | DISCHARGE
Start: 2021-06-11

## 2021-06-11 RX ORDER — SODIUM CHLORIDE 9 MG/ML
1000 INJECTION, SOLUTION INTRAVENOUS
Refills: 0 | Status: DISCONTINUED | OUTPATIENT
Start: 2021-06-11 | End: 2021-06-11

## 2021-06-11 RX ORDER — CEFAZOLIN SODIUM 1 G
2000 VIAL (EA) INJECTION EVERY 8 HOURS
Refills: 0 | Status: DISCONTINUED | OUTPATIENT
Start: 2021-06-11 | End: 2021-06-11

## 2021-06-11 RX ORDER — SENNA PLUS 8.6 MG/1
2 TABLET ORAL AT BEDTIME
Refills: 0 | Status: DISCONTINUED | OUTPATIENT
Start: 2021-06-11 | End: 2021-06-11

## 2021-06-11 RX ORDER — KETOROLAC TROMETHAMINE 30 MG/ML
15 SYRINGE (ML) INJECTION EVERY 6 HOURS
Refills: 0 | Status: DISCONTINUED | OUTPATIENT
Start: 2021-06-11 | End: 2021-06-11

## 2021-06-11 RX ORDER — CELECOXIB 200 MG/1
1 CAPSULE ORAL
Qty: 0 | Refills: 0 | DISCHARGE
Start: 2021-06-11

## 2021-06-11 RX ORDER — OXYCODONE HYDROCHLORIDE 5 MG/1
1 TABLET ORAL
Qty: 0 | Refills: 0 | DISCHARGE
Start: 2021-06-11

## 2021-06-11 RX ORDER — HYDROMORPHONE HYDROCHLORIDE 2 MG/ML
0.5 INJECTION INTRAMUSCULAR; INTRAVENOUS; SUBCUTANEOUS
Refills: 0 | Status: DISCONTINUED | OUTPATIENT
Start: 2021-06-11 | End: 2021-06-11

## 2021-06-11 RX ORDER — PANTOPRAZOLE SODIUM 20 MG/1
1 TABLET, DELAYED RELEASE ORAL
Qty: 0 | Refills: 0 | DISCHARGE
Start: 2021-06-11

## 2021-06-11 RX ORDER — OXYCODONE HYDROCHLORIDE 5 MG/1
5 TABLET ORAL EVERY 4 HOURS
Refills: 0 | Status: DISCONTINUED | OUTPATIENT
Start: 2021-06-11 | End: 2021-06-11

## 2021-06-11 RX ORDER — ASPIRIN/CALCIUM CARB/MAGNESIUM 324 MG
1 TABLET ORAL
Qty: 0 | Refills: 0 | DISCHARGE
Start: 2021-06-11

## 2021-06-11 RX ORDER — FAMOTIDINE 10 MG/ML
20 INJECTION INTRAVENOUS EVERY 12 HOURS
Refills: 0 | Status: DISCONTINUED | OUTPATIENT
Start: 2021-06-11 | End: 2021-06-11

## 2021-06-11 RX ADMIN — OXYCODONE HYDROCHLORIDE 5 MILLIGRAM(S): 5 TABLET ORAL at 14:15

## 2021-06-11 RX ADMIN — CELECOXIB 200 MILLIGRAM(S): 200 CAPSULE ORAL at 05:29

## 2021-06-11 RX ADMIN — OXYCODONE HYDROCHLORIDE 10 MILLIGRAM(S): 5 TABLET ORAL at 05:02

## 2021-06-11 RX ADMIN — PANTOPRAZOLE SODIUM 40 MILLIGRAM(S): 20 TABLET, DELAYED RELEASE ORAL at 05:28

## 2021-06-11 RX ADMIN — OXYCODONE HYDROCHLORIDE 5 MILLIGRAM(S): 5 TABLET ORAL at 13:45

## 2021-06-11 RX ADMIN — CELECOXIB 200 MILLIGRAM(S): 200 CAPSULE ORAL at 06:29

## 2021-06-11 RX ADMIN — OXYCODONE HYDROCHLORIDE 10 MILLIGRAM(S): 5 TABLET ORAL at 04:02

## 2021-06-11 RX ADMIN — Medication 81 MILLIGRAM(S): at 13:41

## 2021-06-11 RX ADMIN — Medication 1 TABLET(S): at 13:42

## 2021-06-11 NOTE — PROGRESS NOTE ADULT - SUBJECTIVE AND OBJECTIVE BOX
Ortho Note    Pt comfortable without complaints, pain controlled  Denies CP, SOB, N/V, numbness/tingling     Vital Signs Last 24 Hrs  T(C): 36.7 (06-11-21 @ 05:15), Max: 36.7 (06-11-21 @ 05:15)  T(F): 98.1 (06-11-21 @ 05:15), Max: 98.1 (06-11-21 @ 05:15)  HR: 76 (06-11-21 @ 05:15) (72 - 76)  BP: 103/69 (06-11-21 @ 05:15) (99/62 - 103/69)  BP(mean): 74 (06-11-21 @ 04:02) (74 - 74)  RR: 15 (06-11-21 @ 05:15) (15 - 16)  SpO2: 97% (06-11-21 @ 05:15) (97% - 98%)  AVSS    General: Pt Alert and oriented, NAD  DSG: left hip WENCESLAO Aquacel  Pulses: bilateral pedal 2+< wwp toes, cap refill < 3sec toes  Sensation: bilateral SILT  Motor: bilateral 5/5 EHL/FHL/TA/GS    06-11    137  |  105  |  16  ----------------------------<  132<H>  5.0   |  22  |  0.83    Ca    8.8      11 Jun 2021 05:31                            10.5   17.52 )-----------( 192      ( 11 Jun 2021 05:31 )             33.0       A/P: 61y Male POD#1 s/p Left WENCESLAO  - Stable, afebrile, nontoxic appearance, IS encouaged  - Pain Control: po meds  - DVT ppx:  mg   - PT, WBS: WBAT   -Bowel regimen: continue bowel regimen  -Dispo: home pending PT clearance    Ortho Pager [phone]
Ortho Note    Pt comfortable without complaints, pain controlled  Denies CP, SOB, N/V, numbness/tingling     Vital Signs Last 24 Hrs  T(C): 36.7 (06-11-21 @ 05:15), Max: 36.7 (06-11-21 @ 05:15)  T(F): 98.1 (06-11-21 @ 05:15), Max: 98.1 (06-11-21 @ 05:15)  HR: 76 (06-11-21 @ 05:15) (72 - 76)  BP: 103/69 (06-11-21 @ 05:15) (99/62 - 103/69)  BP(mean): 74 (06-11-21 @ 04:02) (74 - 74)  RR: 15 (06-11-21 @ 05:15) (15 - 16)  SpO2: 97% (06-11-21 @ 05:15) (97% - 98%)  AVSS    General: Pt Alert and oriented, NAD  L hip aquacell DSG C/D/I  Sensation intact s/s/sp/dp/t  Motor: EHL/FHL/TA/GS 5/5  2+ DP pulse        A/P: 61yMale POD#1 s/p L WENCESLAO 6/10   - Stable  - Pain Control  - DVT ppx: ASA  - PT, WBS: WBAT  - f/u AM labs  - Dispo home when clears PT    Ortho Pager [phone]
Ortho Post Op Check    Procedure: L WENCESLAO   Surgeon: Dr. Alfaro    Pt comfortable without complaints, pain controlled  Denies CP, SOB, N/V, numbness/tingling     Vital Signs Last 24 Hrs  T(C): 36.1 (06-10-21 @ 10:57), Max: 36.1 (06-10-21 @ 10:57)  T(F): 97 (06-10-21 @ 10:57), Max: 97 (06-10-21 @ 10:57)  HR: 68 (06-10-21 @ 13:27) (62 - 78)  BP: 108/54 (06-10-21 @ 13:27) (84/51 - 108/54)  BP(mean): 78 (06-10-21 @ 13:27) (65 - 78)  RR: 17 (06-10-21 @ 13:27) (12 - 21)  SpO2: 97% (06-10-21 @ 13:27) (91% - 97%)  AVSS    General: Pt Alert and oriented, NAD  DSG C/D/I  Pulses: 2+ DP BLE   Sensation: SILT BLE   Motor: EHL/FHL/TA/GS 5/5 BLE    Intra-op fluorscopic guidance demonstrates left hip prosthesis in good position     A/P: 60yo Male POD#0 s/p L WENCESLAO   - Stable  - Pain Control  - DVT ppx: ASA  - Post op abx: Ancef   - PT, WBS: WBAT    Ortho Pager [phone]

## 2021-06-11 NOTE — DISCHARGE NOTE PROVIDER - NSDCMRMEDTOKEN_GEN_ALL_CORE_FT
acetaminophen 325 mg oral tablet: 3 tab(s) orally every 8 hours, As needed, Temp greater or equal to 38C (100.4F), Mild Pain (1 - 3)  Do not exceed 4000mg/day   acetaminophen 325 mg oral tablet: 3 tab(s) orally every 8 hours, As needed, Temp greater or equal to 38C (100.4F), Mild Pain (1 - 3)  Do not exceed 4000mg/day  aspirin 81 mg oral delayed release tablet: 1 tab(s) orally 2 times a day for 4 weeks  celecoxib 200 mg oral capsule: 1 cap(s) orally every 12 hours for 4-6 weeks  oxyCODONE 10 mg oral tablet: 1 tab(s) orally every 6 hours, As Needed - 10) for severe pain  oxyCODONE 5 mg oral tablet: 1 tab(s) orally every 6 hours, As Needed - 6) for moderate pain  pantoprazole 40 mg oral delayed release tablet: 1 tab(s) orally once a day (before a meal) for 4 weeks

## 2021-06-11 NOTE — DISCHARGE NOTE PROVIDER - HOSPITAL COURSE
Admitted 6/10/21  Surgery: left WENCESLAO  Natividad-op Antibiotics  Pain control  DVT prophylaxis  OOB/Physical Therapy

## 2021-06-11 NOTE — DISCHARGE NOTE PROVIDER - CARE PROVIDER_API CALL
Jefferson Alfaro)  Orthopedics  [address] [facility], [place] [address]  Phone: [phone]  Fax: [phone]  Follow Up Time: 2 weeks

## 2021-06-11 NOTE — DISCHARGE NOTE PROVIDER - NSDCACTIVITY_GEN_ALL_CORE
Do not drive or operate machinery/Showering allowed/Do not make important decisions/Stairs allowed/Walking - Indoors allowed/No heavy lifting/straining

## 2021-06-11 NOTE — OCCUPATIONAL THERAPY INITIAL EVALUATION ADULT - ASSISTIVE DEVICE FOR TRANSFER: STAND/SIT, REHAB EVAL
rolling walker
11.8   7.4   )-----------( 362      ( 21 Feb 2019 10:28 )             38.9     02-21    141  |  102  |  14.0  ----------------------------<  142<H>  4.2   |  25.0  |  0.53    Ca    9.9      21 Feb 2019 10:28    TPro  8.7  /  Alb  5.2  /  TBili  0.3<L>  /  DBili  x   /  AST  16  /  ALT  18  /  AlkPhos  70  02-21    LIVER FUNCTIONS - ( 21 Feb 2019 10:28 )  Alb: 5.2 g/dL / Pro: 8.7 g/dL / ALK PHOS: 70 U/L / ALT: 18 U/L / AST: 16 U/L / GGT: x           PT/INR - ( 21 Feb 2019 10:28 )   PT: 11.7 sec;   INR: 1.02 ratio         PTT - ( 21 Feb 2019 10:28 )  PTT:31.2 sec

## 2021-06-11 NOTE — DISCHARGE NOTE NURSING/CASE MANAGEMENT/SOCIAL WORK - PATIENT PORTAL LINK FT
You can access the FollowMyHealth Patient Portal offered by [facility] by registering at the following website: [email or web address]. By joining [employer]’s FollowMyHealth portal, you will also be able to view your health information using other applications (apps) compatible with our system.

## 2021-06-11 NOTE — DISCHARGE NOTE PROVIDER - NSDCCPCAREPLAN_GEN_ALL_CORE_FT
PRINCIPAL DISCHARGE DIAGNOSIS  Diagnosis: Primary osteoarthritis of left hip  Assessment and Plan of Treatment: Left WENCESLAO

## 2021-06-11 NOTE — DISCHARGE NOTE PROVIDER - NSDCCPTREATMENT_GEN_ALL_CORE_FT
PRINCIPAL PROCEDURE  Procedure: Left total hip arthroplasty  Findings and Treatment: left hip osteoarthritis

## 2021-06-14 PROBLEM — M16.9 OSTEOARTHRITIS OF HIP, UNSPECIFIED: Chronic | Status: ACTIVE | Noted: 2021-06-09

## 2021-06-14 PROBLEM — M43.06 SPONDYLOLYSIS, LUMBAR REGION: Chronic | Status: ACTIVE | Noted: 2021-06-09

## 2021-06-14 PROBLEM — N52.9 MALE ERECTILE DYSFUNCTION, UNSPECIFIED: Chronic | Status: ACTIVE | Noted: 2021-06-09

## 2021-06-17 ENCOUNTER — NON-APPOINTMENT (OUTPATIENT)
Age: 61
End: 2021-06-17

## 2021-06-18 DIAGNOSIS — M16.12 UNILATERAL PRIMARY OSTEOARTHRITIS, LEFT HIP: ICD-10-CM

## 2021-06-18 DIAGNOSIS — Z96.641 PRESENCE OF RIGHT ARTIFICIAL HIP JOINT: ICD-10-CM

## 2021-06-18 DIAGNOSIS — N52.9 MALE ERECTILE DYSFUNCTION, UNSPECIFIED: ICD-10-CM

## 2021-06-24 ENCOUNTER — APPOINTMENT (OUTPATIENT)
Dept: ORTHOPEDIC SURGERY | Facility: CLINIC | Age: 61
End: 2021-06-24
Payer: COMMERCIAL

## 2021-06-24 PROCEDURE — 99024 POSTOP FOLLOW-UP VISIT: CPT

## 2021-06-24 NOTE — HISTORY OF PRESENT ILLNESS
[de-identified] : s/p left total hip replacement, anterior, surgical date 06/10/21 [de-identified] : Doing well. Pain controlled with occasional Tylenol. Appears to have gotten confused about aspirin and has been taking 2 twice daily. Finished with home PT. Still using walker because he wasn't specifically told not to. [de-identified] : L hip incision healed, benign appearing. Ambulating without need for walker, a bit stiff but not antalgic. [de-identified] : 62y/o male 2 weeks s/p L WENCESLAO, doing well [de-identified] : Begin outpatient PT\par Cont HEP\par ASA refilled, instructions reviewed\par RTC 4wk with new left hip XRs

## 2021-07-19 ENCOUNTER — APPOINTMENT (OUTPATIENT)
Dept: ORTHOPEDIC SURGERY | Facility: CLINIC | Age: 61
End: 2021-07-19
Payer: COMMERCIAL

## 2021-07-19 ENCOUNTER — OUTPATIENT (OUTPATIENT)
Dept: OUTPATIENT SERVICES | Facility: HOSPITAL | Age: 61
LOS: 1 days | End: 2021-07-19
Payer: COMMERCIAL

## 2021-07-19 ENCOUNTER — RESULT REVIEW (OUTPATIENT)
Age: 61
End: 2021-07-19

## 2021-07-19 DIAGNOSIS — Z98.890 OTHER SPECIFIED POSTPROCEDURAL STATES: Chronic | ICD-10-CM

## 2021-07-19 DIAGNOSIS — Z47.1 AFTERCARE FOLLOWING JOINT REPLACEMENT SURGERY: ICD-10-CM

## 2021-07-19 DIAGNOSIS — Z96.642 AFTERCARE FOLLOWING JOINT REPLACEMENT SURGERY: ICD-10-CM

## 2021-07-19 PROCEDURE — 73502 X-RAY EXAM HIP UNI 2-3 VIEWS: CPT | Mod: 26,LT

## 2021-07-19 PROCEDURE — 73502 X-RAY EXAM HIP UNI 2-3 VIEWS: CPT

## 2021-07-19 PROCEDURE — 99024 POSTOP FOLLOW-UP VISIT: CPT

## 2021-07-19 RX ORDER — ACETAMINOPHEN 500 MG/1
500 TABLET ORAL
Qty: 180 | Refills: 1 | Status: DISCONTINUED | COMMUNITY
Start: 2021-06-09 | End: 2021-07-19

## 2021-07-19 RX ORDER — ASPIRIN 81 MG/1
81 TABLET ORAL
Qty: 60 | Refills: 0 | Status: DISCONTINUED | COMMUNITY
Start: 2021-06-09 | End: 2021-07-19

## 2021-07-19 RX ORDER — CELECOXIB 200 MG/1
200 CAPSULE ORAL TWICE DAILY
Qty: 60 | Refills: 2 | Status: DISCONTINUED | COMMUNITY
Start: 2021-06-09 | End: 2021-07-19

## 2021-07-19 RX ORDER — ASPIRIN 81 MG/1
81 TABLET ORAL
Qty: 32 | Refills: 0 | Status: DISCONTINUED | COMMUNITY
Start: 2021-06-24 | End: 2021-07-19

## 2021-07-19 RX ORDER — PANTOPRAZOLE 40 MG/1
40 TABLET, DELAYED RELEASE ORAL DAILY
Qty: 30 | Refills: 2 | Status: DISCONTINUED | COMMUNITY
Start: 2021-06-09 | End: 2021-07-19

## 2021-07-19 RX ORDER — OXYCODONE 5 MG/1
5 TABLET ORAL
Qty: 50 | Refills: 0 | Status: DISCONTINUED | COMMUNITY
Start: 2021-06-09 | End: 2021-07-19

## 2021-07-20 PROBLEM — Z47.1 AFTERCARE FOLLOWING LEFT HIP JOINT REPLACEMENT SURGERY: Status: ACTIVE | Noted: 2021-06-09

## 2021-07-20 NOTE — HISTORY OF PRESENT ILLNESS
[de-identified] : second post op s/p left total hip replacement, anterior, surgical date 06/10/21.  [de-identified] : Doing well, minimal pain. Doing PT. Off cane now but still walking abnormally.  [de-identified] : L hip incision healed, benign appearing. ROM 0-100 flex, 10 ADD, 25 ABD, 10 IR, 20 ER. Ambulating with stiff bilateral circumducting gait, reduced stride length and thalia.\par  [de-identified] : Left hip XRs demonstrate stable position of the components without mechanical complication. [de-identified] : 62y/o male about 6 weeks s/p L WENCESLAO, doing well [de-identified] : Cont PT, emphasis on gait retraining and hip ROM\par HEP encouraged\par RTC 2mo with new XRs

## 2021-09-13 ENCOUNTER — APPOINTMENT (OUTPATIENT)
Dept: ORTHOPEDIC SURGERY | Facility: CLINIC | Age: 61
End: 2021-09-13

## 2022-05-02 ENCOUNTER — APPOINTMENT (OUTPATIENT)
Dept: INTERNAL MEDICINE | Facility: CLINIC | Age: 62
End: 2022-05-02
Payer: COMMERCIAL

## 2022-05-02 VITALS
WEIGHT: 169 LBS | HEIGHT: 68 IN | BODY MASS INDEX: 25.61 KG/M2 | SYSTOLIC BLOOD PRESSURE: 117 MMHG | HEART RATE: 71 BPM | DIASTOLIC BLOOD PRESSURE: 81 MMHG | OXYGEN SATURATION: 98 % | TEMPERATURE: 97.2 F

## 2022-05-02 PROCEDURE — 99214 OFFICE O/P EST MOD 30 MIN: CPT | Mod: 25

## 2022-05-02 NOTE — PHYSICAL EXAM
[No Acute Distress] : no acute distress [Normal Oropharynx] : the oropharynx was normal [No JVD] : no jugular venous distention [No Lymphadenopathy] : no lymphadenopathy [No Accessory Muscle Use] : no accessory muscle use [Clear to Auscultation] : lungs were clear to auscultation bilaterally [Regular Rhythm] : with a regular rhythm [No Edema] : there was no peripheral edema [Soft] : abdomen soft [Non Tender] : non-tender [Non-distended] : non-distended [Normal Supraclavicular Nodes] : no supraclavicular lymphadenopathy [No Rash] : no rash [Normal Gait] : normal gait [Alert and Oriented x3] : oriented to person, place, and time [de-identified] : decreased ROM R shoulder no focal tenderness

## 2022-05-02 NOTE — PLAN
[FreeTextEntry1] : will check x rays as noted\par blood sent for routine labs\par continue current management otherwise

## 2022-05-02 NOTE — ASSESSMENT
[FreeTextEntry1] : major current complaint centers on R shoulder pain which patient relates to prior history R shoulder secondary to dislocation at early age\par etiology bilateral hand paresthesia unclear possibly related to C spine arthritis

## 2022-05-02 NOTE — HISTORY OF PRESENT ILLNESS
[FreeTextEntry1] : Returns for usual followup has no specific medical complaints, taking medications as noted.\par  [de-identified] : notes frequent pain R shoulder particularly when in bed at night\par also experiences paresthesia both hands after work this has been present x several months

## 2022-05-02 NOTE — HEALTH RISK ASSESSMENT
[Never] : Never [No] : In the past 12 months have you used drugs other than those required for medical reasons? No [No falls in past year] : Patient reported no falls in the past year [0] : 2) Feeling down, depressed, or hopeless: Not at all (0) [Audit-CScore] : 0 [de-identified] : walk, exercise [de-identified] : healty [[id number]] : 0

## 2022-05-02 NOTE — REVIEW OF SYSTEMS
[Fever] : no fever [Chest Pain] : no chest pain [Palpitations] : no palpitations [Claudication] : no  leg claudication [Shortness Of Breath] : no shortness of breath [Joint Stiffness] : joint stiffness [Confusion] : no confusion [Depression] : no depression

## 2022-05-23 LAB
25(OH)D3 SERPL-MCNC: 57.8 NG/ML
ALBUMIN SERPL ELPH-MCNC: 4.1 G/DL
ALP BLD-CCNC: 107 U/L
ALT SERPL-CCNC: 33 U/L
ANION GAP SERPL CALC-SCNC: 14 MMOL/L
AST SERPL-CCNC: 32 U/L
BASOPHILS # BLD AUTO: 0.09 K/UL
BASOPHILS NFR BLD AUTO: 1.1 %
BILIRUB SERPL-MCNC: 0.8 MG/DL
BUN SERPL-MCNC: 24 MG/DL
CALCIUM SERPL-MCNC: 9.3 MG/DL
CHLORIDE SERPL-SCNC: 105 MMOL/L
CHOLEST SERPL-MCNC: 158 MG/DL
CO2 SERPL-SCNC: 21 MMOL/L
CREAT SERPL-MCNC: 1 MG/DL
EGFR: 86 ML/MIN/1.73M2
EOSINOPHIL # BLD AUTO: 0.25 K/UL
EOSINOPHIL NFR BLD AUTO: 2.9 %
FOLATE SERPL-MCNC: 17.1 NG/ML
GLUCOSE SERPL-MCNC: 96 MG/DL
HCT VFR BLD CALC: 46.3 %
HDLC SERPL-MCNC: 85 MG/DL
HGB BLD-MCNC: 13.9 G/DL
IMM GRANULOCYTES NFR BLD AUTO: 0.4 %
LDLC SERPL CALC-MCNC: 62 MG/DL
LYMPHOCYTES # BLD AUTO: 2.02 K/UL
LYMPHOCYTES NFR BLD AUTO: 23.6 %
MAN DIFF?: NORMAL
MCHC RBC-ENTMCNC: 29 PG
MCHC RBC-ENTMCNC: 30 GM/DL
MCV RBC AUTO: 96.5 FL
MONOCYTES # BLD AUTO: 0.74 K/UL
MONOCYTES NFR BLD AUTO: 8.7 %
NEUTROPHILS # BLD AUTO: 5.42 K/UL
NEUTROPHILS NFR BLD AUTO: 63.3 %
NONHDLC SERPL-MCNC: 73 MG/DL
PLATELET # BLD AUTO: 269 K/UL
POTASSIUM SERPL-SCNC: 4.5 MMOL/L
PROT SERPL-MCNC: 7.3 G/DL
PSA SERPL-MCNC: 3.16 NG/ML
RBC # BLD: 4.8 M/UL
RBC # FLD: 13.3 %
SODIUM SERPL-SCNC: 140 MMOL/L
T4 FREE SERPL-MCNC: 1.3 NG/DL
T4 SERPL-MCNC: 6.4 UG/DL
TRIGL SERPL-MCNC: 56 MG/DL
VIT B12 SERPL-MCNC: 1075 PG/ML
WBC # FLD AUTO: 8.55 K/UL

## 2022-06-21 NOTE — OCCUPATIONAL THERAPY INITIAL EVALUATION ADULT - ASSISTIVE DEVICE FOR TRANSFER: SIT/STAND, REHAB EVAL
06/21/22 1036   Discharge Assessment   Assessment Type Discharge Planning Assessment   Confirmed/corrected address, phone number and insurance Yes   Source of Information patient   When was your last doctors appointment?   (Patient reports PCP appointment was 1 week ago.)   Lives With spouse   Do you expect to return to your current living situation? Yes   Do you have help at home or someone to help you manage your care at home? Yes   Prior to hospitilization cognitive status: Alert/Oriented   Current cognitive status: Alert/Oriented   Walking or Climbing Stairs Difficulty none   Dressing/Bathing Difficulty none   Equipment Currently Used at Home walker, rolling;cane, straight   Readmission within 30 days? No   Do you currently have service(s) that help you manage your care at home? Yes   Name and Contact number of agency [facility]   Is the pt/caregiver preference to resume services with current agency Yes   Do you take prescription medications? Yes   Do you have prescription coverage? Yes   Do you have any problems affording any of your prescribed medications? No   How do you get to doctors appointments? family or friend will provide   Are you on dialysis? No   Do you take coumadin? No   Discharge Plan A Home Health   DME Needed Upon Discharge  none   Discharge Plan discussed with: Patient   PCP is Tierra Johnson  
  Problem: Adult Inpatient Plan of Care  Goal: Plan of Care Review  6/21/2022 0748 by Kimmie Licona RN  Outcome: Ongoing, Progressing  6/21/2022 0505 by Kimmie Licona RN  Outcome: Ongoing, Progressing  Goal: Patient-Specific Goal (Individualized)  6/21/2022 0748 by Kimime Licona RN  Outcome: Ongoing, Progressing  6/21/2022 0505 by Kimmie Licona RN  Outcome: Ongoing, Progressing  Goal: Absence of Hospital-Acquired Illness or Injury  6/21/2022 0748 by Kimmie Licona RN  Outcome: Ongoing, Progressing  6/21/2022 0505 by Kimmie Licona RN  Outcome: Ongoing, Progressing  Goal: Optimal Comfort and Wellbeing  6/21/2022 0748 by Kimmie Licona RN  Outcome: Ongoing, Progressing  6/21/2022 0505 by Kimmie Licona RN  Outcome: Ongoing, Progressing  Goal: Readiness for Transition of Care  6/21/2022 0748 by Kimmie Licona RN  Outcome: Ongoing, Progressing  6/21/2022 0505 by Kimmie Licona RN  Outcome: Ongoing, Progressing     Problem: Fluid and Electrolyte Imbalance (Acute Kidney Injury/Impairment)  Goal: Fluid and Electrolyte Balance  6/21/2022 0748 by Kimmie Licona RN  Outcome: Ongoing, Progressing  6/21/2022 0505 by Kimmie Licona RN  Outcome: Ongoing, Progressing     Problem: Oral Intake Inadequate (Acute Kidney Injury/Impairment)  Goal: Optimal Nutrition Intake  6/21/2022 0748 by Kimmie Licona RN  Outcome: Ongoing, Progressing  6/21/2022 0505 by Kimmie Licona RN  Outcome: Ongoing, Progressing     Problem: Renal Function Impairment (Acute Kidney Injury/Impairment)  Goal: Effective Renal Function  6/21/2022 0748 by Kimmie Licona RN  Outcome: Ongoing, Progressing  6/21/2022 0505 by Kimmie Licona RN  Outcome: Ongoing, Progressing     Problem: Fall Injury Risk  Goal: Absence of Fall and Fall-Related Injury  6/21/2022 0748 by Kimmie Licona RN  Outcome: Ongoing, Progressing  6/21/2022 0505 by Kimmie Licona RN  Outcome: Ongoing, Progressing     
  Problem: Adult Inpatient Plan of Care  Goal: Plan of Care Review  Outcome: Ongoing, Progressing  Goal: Patient-Specific Goal (Individualized)  Outcome: Ongoing, Progressing  Goal: Absence of Hospital-Acquired Illness or Injury  Outcome: Ongoing, Progressing  Goal: Optimal Comfort and Wellbeing  Outcome: Ongoing, Progressing  Goal: Readiness for Transition of Care  Outcome: Ongoing, Progressing     Problem: Fluid and Electrolyte Imbalance (Acute Kidney Injury/Impairment)  Goal: Fluid and Electrolyte Balance  Outcome: Ongoing, Progressing     Problem: Oral Intake Inadequate (Acute Kidney Injury/Impairment)  Goal: Optimal Nutrition Intake  Outcome: Ongoing, Progressing     Problem: Renal Function Impairment (Acute Kidney Injury/Impairment)  Goal: Effective Renal Function  Outcome: Ongoing, Progressing     Problem: Fall Injury Risk  Goal: Absence of Fall and Fall-Related Injury  Outcome: Ongoing, Progressing     
Patient is current with [facility]. Discharge documentation sent to [facility] via [facility]. Oriana Blevins.  
rolling walker

## 2022-06-23 NOTE — DISCHARGE NOTE PROVIDER - NPI NUMBER (FOR SYSADMIN USE ONLY) :
Anesthesia Pre-Procedure Evaluation    Patient: Barron Delgado   MRN: [id number] : 1961        Procedure : Procedure(s):  COLONOSCOPY          Past Medical History:   Diagnosis Date     Diverticulitis      Sleep apnea       Past Surgical History:   Procedure Laterality Date     BRAIN SURGERY      Aneurysmal Ambolization     HIP SURGERY       TONSILLECTOMY & ADENOIDECTOMY        No Known Allergies   Social History     Tobacco Use     Smoking status: Never Smoker     Smokeless tobacco: Former User     Types: Chew     Quit date:    Substance Use Topics     Alcohol use: Yes     Alcohol/week: 10.0 standard drinks     Comment: Alcoholic Drinks/day: Occasionally      Wt Readings from Last 1 Encounters:   22 117.9 kg (260 lb)        Anesthesia Evaluation   Pt has had prior anesthetic. Type: General.    No history of anesthetic complications       ROS/MED HX  ENT/Pulmonary:     (+) sleep apnea, doesn't use CPAP, tobacco use, Past use,     Neurologic: Comment: H/o aneurysm coil x 2 - asymptomatic - neg neurologic ROS     Cardiovascular:  - neg cardiovascular ROS   (+) Dyslipidemia -----    METS/Exercise Tolerance:     Hematologic:  - neg hematologic  ROS     Musculoskeletal:  - neg musculoskeletal ROS     GI/Hepatic:  - neg GI/hepatic ROS     Renal/Genitourinary:  - neg Renal ROS     Endo:  - neg endo ROS   (+) Obesity (bmi 31.6),     Psychiatric/Substance Use:  - neg psychiatric ROS     Infectious Disease:  - neg infectious disease ROS     Malignancy:  - neg malignancy ROS     Other:            Physical Exam    Airway        Mallampati: III   TM distance: > 3 FB   Neck ROM: full   Mouth opening: > 3 cm    Respiratory Devices and Support         Dental  no notable dental history     (+) caps      Cardiovascular   cardiovascular exam normal          Pulmonary   pulmonary exam normal                OUTSIDE LABS:  CBC: No results found for: WBC, HGB, HCT, PLT  BMP:   Lab Results   Component Value Date    NA  139 04/26/2022     01/26/2021    POTASSIUM 4.9 04/26/2022    POTASSIUM 4.4 01/26/2021    CHLORIDE 102 04/26/2022    CHLORIDE 100 01/26/2021    CO2 25 04/26/2022    CO2 27 01/26/2021    BUN 19 04/26/2022    BUN 17 01/26/2021    CR 0.95 04/26/2022    CR 0.94 01/26/2021     04/26/2022    GLC 98 01/26/2021     COAGS: No results found for: PTT, INR, FIBR  POC: No results found for: BGM, HCG, HCGS  HEPATIC:   Lab Results   Component Value Date    ALBUMIN 4.4 04/26/2022    PROTTOTAL 7.5 04/26/2022    ALT 31 04/26/2022    AST 25 04/26/2022    ALKPHOS 64 04/26/2022    BILITOTAL 0.9 04/26/2022     OTHER:   Lab Results   Component Value Date    BENEDICTO 9.7 04/26/2022       Anesthesia Plan    ASA Status:  2   NPO Status:  NPO Appropriate    Anesthesia Type: MAC.     - Reason for MAC: immobility needed, straight local not clinically adequate   Induction: Propofol.   Maintenance: TIVA.        Consents    Anesthesia Plan(s) and associated risks, benefits, and realistic alternatives discussed. Questions answered and patient/representative(s) expressed understanding.    - Discussed:     - Discussed with:  Patient         Postoperative Care    Pain management: Multi-modal analgesia.   PONV prophylaxis: Ondansetron (or other 5HT-3), Dexamethasone or Solumedrol     Comments:    Other Comments: Propofol infusion    Reviewed anesthetic options and risks. Patient agrees to proceed.             Rodo Orta MD   [[id number]]

## 2022-11-29 ENCOUNTER — APPOINTMENT (OUTPATIENT)
Dept: INTERNAL MEDICINE | Facility: CLINIC | Age: 62
End: 2022-11-29

## 2022-11-29 ENCOUNTER — NON-APPOINTMENT (OUTPATIENT)
Age: 62
End: 2022-11-29

## 2022-11-29 VITALS
WEIGHT: 176 LBS | HEIGHT: 68 IN | TEMPERATURE: 97.7 F | BODY MASS INDEX: 26.67 KG/M2 | DIASTOLIC BLOOD PRESSURE: 82 MMHG | OXYGEN SATURATION: 96 % | SYSTOLIC BLOOD PRESSURE: 116 MMHG | HEART RATE: 79 BPM

## 2022-11-29 DIAGNOSIS — M25.511 PAIN IN RIGHT SHOULDER: ICD-10-CM

## 2022-11-29 DIAGNOSIS — Z12.11 ENCOUNTER FOR SCREENING FOR MALIGNANT NEOPLASM OF COLON: ICD-10-CM

## 2022-11-29 DIAGNOSIS — M19.90 UNSPECIFIED OSTEOARTHRITIS, UNSPECIFIED SITE: ICD-10-CM

## 2022-11-29 PROCEDURE — 99396 PREV VISIT EST AGE 40-64: CPT | Mod: 25

## 2022-11-29 RX ORDER — DOCUSATE SODIUM 100 MG/1
100 CAPSULE ORAL 3 TIMES DAILY
Qty: 90 | Refills: 0 | Status: DISCONTINUED | COMMUNITY
Start: 2021-06-12 | End: 2022-11-29

## 2022-11-29 RX ORDER — ENEMA 19; 7 G/133ML; G/133ML
7-19 ENEMA RECTAL
Qty: 1 | Refills: 0 | Status: DISCONTINUED | COMMUNITY
Start: 2021-06-12 | End: 2022-11-29

## 2022-11-29 NOTE — HEALTH RISK ASSESSMENT
[Good] : ~his/her~  mood as  good [Never] : Never [No] : In the past 12 months have you used drugs other than those required for medical reasons? No [No falls in past year] : Patient reported no falls in the past year [0] : 2) Feeling down, depressed, or hopeless: Not at all (0) [Patient reported colonoscopy was normal] : Patient reported colonoscopy was normal [HIV Test offered] : HIV Test offered [Hepatitis C test offered] : Hepatitis C test offered [# of Members in Household ___] :  household currently consist of [unfilled] member(s) [Employed] : employed [] :  [Smoke Detector] : smoke detector [Carbon Monoxide Detector] : carbon monoxide detector [Seat Belt] :  uses seat belt [de-identified] : No [de-identified] : [facility] for Xray  [de-identified] : Active at work , Very little exercises  [de-identified] : Healthy [ZFN5Aosim] : 0 [Reports changes in hearing] : Reports no changes in hearing [Reports changes in vision] : Reports no changes in vision [Reports changes in dental health] : Reports no changes in dental health [Sunscreen] : does not use sunscreen [ColonoscopyDate] : 01/14 [de-identified] : Wife

## 2022-11-29 NOTE — HISTORY OF PRESENT ILLNESS
[FreeTextEntry1] : CPE [de-identified] : The patient is a 62 year old M who presents to the office for CPE\par \par #Right shoulder pain \par Xray of cervical spine completed at [facility] - degenerative changes of Cspine \par Hand numbness \par History of multiple shoulder dislocations now with decreased ROM \par History of surgical repair at [facility] in 1970s\par Overhead lifting for work - works at [place] \par Right handed \par \par Routine Health maintenance (as applicable)\par Colonoscopy (45+): [facility] several years ago. Needs referral \par COVID vaccine series: completed \par Flu: Oct 2022 \par Tdap (19-64): deferred \par Shingles (60+): deferred\par PCV: deferred

## 2022-11-29 NOTE — ASSESSMENT
[FreeTextEntry1] : The patient is a 62 year old M who presents to the office for CPE \par \par Labs drawn in office.\par Appropriate medication renewal(s) provided.\par Provided scripts for necessary imaging and/or referrals.\par Further management to be completed pending lab results and imaging studies.\par \par \par \par Routine Health maintenance (as applicable)\par Colonoscopy (45+): [facility] several years ago. Needs referral \par COVID vaccine series: completed \par Flu: Oct 2022 \par Tdap (19-64): deferred \par Shingles (60+): deferred\par PCV: deferred

## 2022-11-29 NOTE — PHYSICAL EXAM
[Normal] : normal rate, regular rhythm, normal S1 and S2 and no murmur heard [No Joint Swelling] : no joint swelling [Grossly Normal Strength/Tone] : grossly normal strength/tone [de-identified] : Right shoulder ROM limited by pain

## 2022-11-30 ENCOUNTER — APPOINTMENT (OUTPATIENT)
Dept: INTERNAL MEDICINE | Facility: CLINIC | Age: 62
End: 2022-11-30

## 2022-12-01 DIAGNOSIS — R74.8 ABNORMAL LEVELS OF OTHER SERUM ENZYMES: ICD-10-CM

## 2022-12-01 LAB
ALBUMIN SERPL ELPH-MCNC: 3.4 G/DL
ALP BLD-CCNC: 108 U/L
ALT SERPL-CCNC: 81 U/L
ANION GAP SERPL CALC-SCNC: 9 MMOL/L
AST SERPL-CCNC: 59 U/L
BASOPHILS # BLD AUTO: 0.08 K/UL
BASOPHILS NFR BLD AUTO: 0.9 %
BILIRUB SERPL-MCNC: 0.6 MG/DL
BUN SERPL-MCNC: 24 MG/DL
CALCIUM SERPL-MCNC: 9.3 MG/DL
CHLORIDE SERPL-SCNC: 106 MMOL/L
CHOLEST SERPL-MCNC: 155 MG/DL
CO2 SERPL-SCNC: 23 MMOL/L
CREAT SERPL-MCNC: 0.97 MG/DL
EGFR: 89 ML/MIN/1.73M2
EOSINOPHIL # BLD AUTO: 0.17 K/UL
EOSINOPHIL NFR BLD AUTO: 1.9 %
ESTIMATED AVERAGE GLUCOSE: 91 MG/DL
FOLATE SERPL-MCNC: 17.7 NG/ML
GLUCOSE SERPL-MCNC: 106 MG/DL
HBA1C MFR BLD HPLC: 4.8 %
HCT VFR BLD CALC: 43.9 %
HDLC SERPL-MCNC: 62 MG/DL
HGB BLD-MCNC: 13.7 G/DL
IMM GRANULOCYTES NFR BLD AUTO: 0.3 %
LDLC SERPL CALC-MCNC: 80 MG/DL
LYMPHOCYTES # BLD AUTO: 2.08 K/UL
LYMPHOCYTES NFR BLD AUTO: 23.8 %
MAN DIFF?: NORMAL
MCHC RBC-ENTMCNC: 29.3 PG
MCHC RBC-ENTMCNC: 31.2 GM/DL
MCV RBC AUTO: 94 FL
MONOCYTES # BLD AUTO: 0.69 K/UL
MONOCYTES NFR BLD AUTO: 7.9 %
NEUTROPHILS # BLD AUTO: 5.69 K/UL
NEUTROPHILS NFR BLD AUTO: 65.2 %
NONHDLC SERPL-MCNC: 93 MG/DL
PLATELET # BLD AUTO: 296 K/UL
POTASSIUM SERPL-SCNC: 4.7 MMOL/L
PROT SERPL-MCNC: 6.1 G/DL
PSA SERPL-MCNC: 4.46 NG/ML
RBC # BLD: 4.67 M/UL
RBC # FLD: 13.1 %
SODIUM SERPL-SCNC: 138 MMOL/L
TRIGL SERPL-MCNC: 65 MG/DL
TSH SERPL-ACNC: 1.84 UIU/ML
VIT B12 SERPL-MCNC: 1060 PG/ML
WBC # FLD AUTO: 8.74 K/UL

## 2022-12-06 ENCOUNTER — APPOINTMENT (OUTPATIENT)
Dept: CARDIOLOGY | Facility: CLINIC | Age: 62
End: 2022-12-06

## 2022-12-06 VITALS
DIASTOLIC BLOOD PRESSURE: 83 MMHG | WEIGHT: 181 LBS | BODY MASS INDEX: 27.43 KG/M2 | TEMPERATURE: 98.3 F | SYSTOLIC BLOOD PRESSURE: 133 MMHG | HEART RATE: 69 BPM | OXYGEN SATURATION: 99 % | HEIGHT: 68 IN

## 2022-12-06 PROCEDURE — 93000 ELECTROCARDIOGRAM COMPLETE: CPT

## 2022-12-06 PROCEDURE — 99205 OFFICE O/P NEW HI 60 MIN: CPT | Mod: 25

## 2022-12-06 NOTE — HISTORY OF PRESENT ILLNESS
[FreeTextEntry1] : Total visit time 45min.\par WILFRIDO IRVIN 62 year M presents with HTN HLD and reports no dyspnea, chest pain, palpitations, syncope, claudication mild peripheral edema.   Tobacco Negative/Reformed. BMI 27.\par 11/22 EKG NSR 65 BPM. LAE, Low voltage. Sildenafil, nil else. ALT > AST. Echo, Treadmill. No blood transfusion. Apparently taking testosterone supplements from [facility] may relate to transaminitis. No hx GI upset. Echo, TMT and nuclear pyrophosphate scan for possible amyloid. No disparity in total protein and albumin. Advised testosterone discontinuation. Intact renal function. Atrial enlargement low voltage and transaminitis raise amyloid possibility.

## 2023-01-18 ENCOUNTER — APPOINTMENT (OUTPATIENT)
Dept: CARDIOLOGY | Facility: CLINIC | Age: 63
End: 2023-01-18
Payer: COMMERCIAL

## 2023-01-18 PROCEDURE — 93306 TTE W/DOPPLER COMPLETE: CPT

## 2023-01-20 ENCOUNTER — NON-APPOINTMENT (OUTPATIENT)
Age: 63
End: 2023-01-20

## 2023-01-25 ENCOUNTER — APPOINTMENT (OUTPATIENT)
Dept: INTERNAL MEDICINE | Facility: CLINIC | Age: 63
End: 2023-01-25
Payer: COMMERCIAL

## 2023-01-25 ENCOUNTER — LABORATORY RESULT (OUTPATIENT)
Age: 63
End: 2023-01-25

## 2023-01-25 ENCOUNTER — APPOINTMENT (OUTPATIENT)
Dept: CARDIOLOGY | Facility: CLINIC | Age: 63
End: 2023-01-25

## 2023-01-25 PROCEDURE — 36415 COLL VENOUS BLD VENIPUNCTURE: CPT

## 2023-01-29 LAB
ALBUMIN MFR SERPL ELPH: 52.5 %
ALBUMIN SERPL-MCNC: 3 G/DL
ALBUMIN/GLOB SERPL: 1.1 RATIO
ALPHA1 GLOB MFR SERPL ELPH: 4.7 %
ALPHA1 GLOB SERPL ELPH-MCNC: 0.3 G/DL
ALPHA2 GLOB MFR SERPL ELPH: 10.4 %
ALPHA2 GLOB SERPL ELPH-MCNC: 0.6 G/DL
B-GLOBULIN MFR SERPL ELPH: 14.7 %
B-GLOBULIN SERPL ELPH-MCNC: 0.9 G/DL
GAMMA GLOB FLD ELPH-MCNC: 1 G/DL
GAMMA GLOB MFR SERPL ELPH: 17.7 %
INTERPRETATION SERPL IEP-IMP: NORMAL
PROT SERPL-MCNC: 5.8 G/DL
PROT SERPL-MCNC: 5.8 G/DL

## 2023-01-31 LAB
ALBUPE: 23.7 %
ALPHA1UPE: 32.1 %
ALPHA2UPE: 20.3 %
BETAUPE: 15.5 %
GAMMAUPE: 8.4 %
IGA 24H UR QL IFE: NORMAL
KAPPA LC 24H UR QL: NORMAL
PROT PATTERN 24H UR ELPH-IMP: NORMAL
PROT UR-MCNC: 21 MG/DL
PROT UR-MCNC: 21 MG/DL

## 2023-03-08 ENCOUNTER — APPOINTMENT (OUTPATIENT)
Dept: CARDIOLOGY | Facility: CLINIC | Age: 63
End: 2023-03-08
Payer: COMMERCIAL

## 2023-03-08 VITALS
OXYGEN SATURATION: 98 % | WEIGHT: 181 LBS | BODY MASS INDEX: 27.43 KG/M2 | SYSTOLIC BLOOD PRESSURE: 122 MMHG | HEIGHT: 68 IN | TEMPERATURE: 98.5 F | HEART RATE: 68 BPM | DIASTOLIC BLOOD PRESSURE: 74 MMHG

## 2023-03-08 PROCEDURE — 99215 OFFICE O/P EST HI 40 MIN: CPT

## 2023-03-08 NOTE — HISTORY OF PRESENT ILLNESS
[FreeTextEntry1] : Total visit time 45min.\par WILFRIDO IRVIN 62 year M presents with HTN HLD and reports no dyspnea, chest pain, palpitations, syncope, claudication mild peripheral edema.   Tobacco Negative/Reformed. BMI 27.\par 11/22 EKG NSR 65 BPM. LAE, Low voltage. Sildenafil, nil else. ALT > AST. Echo, Treadmill. No blood transfusion. Apparently taking testosterone supplements from [facility] may relate to transaminitis. No hx GI upset.1/18/23  Echo, [facility] moderate, LAE , MR moderate, DD grade 3. EF 43%.TMT and TTR imaging. SPEP negative. No disparity in total protein and albumin. Advised testosterone discontinuation. Intact renal function. Atrial enlargement low voltage and transaminitis raise amyloid. Macroglossia, periorbital ecchymoses bilateral nocturnal paresthesiae and orthostasis. MRI  scan positive for TTR amyloid refer to Dr. Romero for further eval treatment.

## 2023-03-08 NOTE — PHYSICAL EXAM
[Well Nourished] : well nourished [No Acute Distress] : no acute distress [Normal Conjunctiva] : normal conjunctiva [Normal Venous Pressure] : normal venous pressure [No Carotid Bruit] : no carotid bruit [Normal S1, S2] : normal S1, S2 [No Murmur] : no murmur [No Rub] : no rub [No Gallop] : no gallop [Clear Lung Fields] : clear lung fields [Good Air Entry] : good air entry [No Respiratory Distress] : no respiratory distress  [Soft] : abdomen soft [Non Tender] : non-tender [No Masses/organomegaly] : no masses/organomegaly [Normal Bowel Sounds] : normal bowel sounds [Normal Gait] : normal gait [No Edema] : no edema [No Cyanosis] : no cyanosis [No Clubbing] : no clubbing [No Varicosities] : no varicosities [No Rash] : no rash [No Skin Lesions] : no skin lesions [Moves all extremities] : moves all extremities [No Focal Deficits] : no focal deficits [Normal Speech] : normal speech [Alert and Oriented] : alert and oriented [Normal memory] : normal memory [de-identified] : Macroglossia, periorbital ecchymoses [de-identified] : No periorbital ecchymosis

## 2023-03-14 ENCOUNTER — APPOINTMENT (OUTPATIENT)
Dept: CARDIOLOGY | Facility: CLINIC | Age: 63
End: 2023-03-14

## 2023-03-15 ENCOUNTER — APPOINTMENT (OUTPATIENT)
Dept: CARDIOLOGY | Facility: CLINIC | Age: 63
End: 2023-03-15
Payer: COMMERCIAL

## 2023-03-15 ENCOUNTER — NON-APPOINTMENT (OUTPATIENT)
Age: 63
End: 2023-03-15

## 2023-03-15 VITALS
OXYGEN SATURATION: 97 % | WEIGHT: 178 LBS | DIASTOLIC BLOOD PRESSURE: 78 MMHG | BODY MASS INDEX: 27.07 KG/M2 | HEART RATE: 75 BPM | SYSTOLIC BLOOD PRESSURE: 122 MMHG

## 2023-03-15 PROCEDURE — 99205 OFFICE O/P NEW HI 60 MIN: CPT | Mod: 25

## 2023-03-15 PROCEDURE — 93000 ELECTROCARDIOGRAM COMPLETE: CPT

## 2023-03-16 LAB
ALBUMIN SERPL ELPH-MCNC: 3.6 G/DL
ALP BLD-CCNC: 112 U/L
ALT SERPL-CCNC: 52 U/L
ANION GAP SERPL CALC-SCNC: 11 MMOL/L
AST SERPL-CCNC: 35 U/L
BASOPHILS # BLD AUTO: 0.11 K/UL
BASOPHILS NFR BLD AUTO: 1 %
BILIRUB SERPL-MCNC: 0.7 MG/DL
BUN SERPL-MCNC: 17 MG/DL
CALCIUM SERPL-MCNC: 9 MG/DL
CHLORIDE SERPL-SCNC: 106 MMOL/L
CO2 SERPL-SCNC: 21 MMOL/L
CREAT SERPL-MCNC: 1.12 MG/DL
DEPRECATED KAPPA LC FREE/LAMBDA SER: 1.73 RATIO
EGFR: 75 ML/MIN/1.73M2
EOSINOPHIL # BLD AUTO: 0.49 K/UL
EOSINOPHIL NFR BLD AUTO: 4.6 %
GLUCOSE SERPL-MCNC: 95 MG/DL
HCT VFR BLD CALC: 46.5 %
HGB BLD-MCNC: 14.4 G/DL
IMM GRANULOCYTES NFR BLD AUTO: 0.4 %
KAPPA LC CSF-MCNC: 1.97 MG/DL
KAPPA LC SERPL-MCNC: 3.4 MG/DL
LYMPHOCYTES # BLD AUTO: 2.54 K/UL
LYMPHOCYTES NFR BLD AUTO: 23.6 %
MAN DIFF?: NORMAL
MCHC RBC-ENTMCNC: 29.2 PG
MCHC RBC-ENTMCNC: 31 GM/DL
MCV RBC AUTO: 94.3 FL
MONOCYTES # BLD AUTO: 0.87 K/UL
MONOCYTES NFR BLD AUTO: 8.1 %
NEUTROPHILS # BLD AUTO: 6.71 K/UL
NEUTROPHILS NFR BLD AUTO: 62.3 %
NT-PROBNP SERPL-MCNC: 235 PG/ML
PLATELET # BLD AUTO: 287 K/UL
POTASSIUM SERPL-SCNC: 4.5 MMOL/L
PREALB SERPL NEPH-MCNC: 16 MG/DL
PROT SERPL-MCNC: 6.3 G/DL
RBC # BLD: 4.93 M/UL
RBC # FLD: 13.3 %
SODIUM SERPL-SCNC: 138 MMOL/L
WBC # FLD AUTO: 10.76 K/UL

## 2023-03-22 ENCOUNTER — APPOINTMENT (OUTPATIENT)
Dept: NUCLEAR MEDICINE | Facility: IMAGING CENTER | Age: 63
End: 2023-03-22
Payer: COMMERCIAL

## 2023-03-22 ENCOUNTER — OUTPATIENT (OUTPATIENT)
Dept: OUTPATIENT SERVICES | Facility: HOSPITAL | Age: 63
LOS: 1 days | End: 2023-03-22
Payer: COMMERCIAL

## 2023-03-22 DIAGNOSIS — Z98.890 OTHER SPECIFIED POSTPROCEDURAL STATES: Chronic | ICD-10-CM

## 2023-03-22 DIAGNOSIS — E85.89 OTHER AMYLOIDOSIS: ICD-10-CM

## 2023-03-22 PROCEDURE — 78830 RP LOCLZJ TUM SPECT W/CT 1: CPT | Mod: 26

## 2023-03-22 PROCEDURE — A9538: CPT

## 2023-03-22 PROCEDURE — 78830 RP LOCLZJ TUM SPECT W/CT 1: CPT

## 2023-03-28 ENCOUNTER — NON-APPOINTMENT (OUTPATIENT)
Age: 63
End: 2023-03-28

## 2023-03-29 NOTE — HISTORY OF PRESENT ILLNESS
[FreeTextEntry1] : John Lozano presents today for evaluation of possible cardiac amyloidosis. \par He is a 62 year old [place] man with hypertension, hyperlipidemia, and transaminitis (possibly related to [employer] testosterone supplements) and recent cardiac MRI which was suggestive of cardiac amyloidosis with reduced LV/RV systolic function, global hypokinesis and focal hypertrophy who presents today to establish care. \par \par He does report bilateral neuropathy of his hands and fingers and no spinal issues.\par He does experience exertional dyspnea if he walks too quickly, however is otherwise able to walk many blocks and climb one flight of stairs without any chest discomfort, palpitations, lightheadedness or syncope.\par \par Surgical history includes bilateral hip replacement (2007, 2020). 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 \par  \par \par \par \par

## 2023-03-29 NOTE — DISCUSSION/SUMMARY
[EKG obtained to assist in diagnosis and management of assessed problem(s)] : EKG obtained to assist in diagnosis and management of assessed problem(s) [FreeTextEntry1] : He is a 62 year old with MRI suggestive of cardiac amyloidosis who presents today to establish care. \par \par Recent cardiac testing as above.\par Will send labs today with further recommendations thereafter. \par Plan for pyrophosphate scan to confirm the diagnosis of transthyretin cardiac amyloidosis.\par \par Will start stabilizer therapy with Vyndamax 61 mg daily.\par \par Follow up in the office in one month.

## 2023-03-29 NOTE — CARDIOLOGY SUMMARY
[de-identified] : 3/15/2023. Normal sinus rhythm at 77 BPM. Low voltage in the limb leads. Nonspecific ST and T abnormality.  [de-identified] : 1/18/2023. TTE. LVEF 43%.\par 1. The entire septum is abnormal.\par 2. Left ventricular ejection fraction is mildly reduced with an LVEF of 43% by Harrington's biplant method. \par 3. There is moderate concentric left ventricular hypertrophy.\par 4. Severe (Grade 3) left ventricular diastolic dysfunction.\par 5. Moderate mitral valve regurgitation.\par 6. No significant valvular disease.\par 7. Mild thickening of the aortic valve leaflets.\par 8. No pericardial effusion seen. [de-identified] : 2/21/2023. Cardiac MRI.\par 1. Suspect cardiac amyloidosis; qualitative reduced LV/RV systolic function and global hypokineses with focal hypertrophy (next wall thickness 17 mm). \par 2. Abnormal parametric values of T1, T2 and LGE in a classic pattern for amyloidosis. \par 3. The cardiac valves and pericardium are unremarkable.\par 4. The extra cardiac is unremarkable. [de-identified] : 3/22/2023. PYP scan. Cardiac imaging study is strongly suggestive of transthyretin cardiac amyloidosis.

## 2023-03-29 NOTE — END OF VISIT
[Time Spent: ___ minutes] : I have spent [unfilled] minutes of time on the encounter. [FreeTextEntry3] : I saw the patient with Nallely Garcia NP and I agree with the findings and plan as above.

## 2023-04-12 ENCOUNTER — NON-APPOINTMENT (OUTPATIENT)
Age: 63
End: 2023-04-12

## 2023-04-12 ENCOUNTER — APPOINTMENT (OUTPATIENT)
Dept: CARDIOLOGY | Facility: CLINIC | Age: 63
End: 2023-04-12
Payer: COMMERCIAL

## 2023-04-12 VITALS
SYSTOLIC BLOOD PRESSURE: 117 MMHG | OXYGEN SATURATION: 97 % | WEIGHT: 176 LBS | HEART RATE: 70 BPM | BODY MASS INDEX: 26.76 KG/M2 | DIASTOLIC BLOOD PRESSURE: 80 MMHG

## 2023-04-12 DIAGNOSIS — R20.2 PARESTHESIA OF SKIN: ICD-10-CM

## 2023-04-12 PROCEDURE — 93000 ELECTROCARDIOGRAM COMPLETE: CPT

## 2023-04-12 PROCEDURE — 99215 OFFICE O/P EST HI 40 MIN: CPT | Mod: 25

## 2023-04-19 NOTE — DISCUSSION/SUMMARY
[EKG obtained to assist in diagnosis and management of assessed problem(s)] : EKG obtained to assist in diagnosis and management of assessed problem(s) [FreeTextEntry1] : He is a 62 year old [place] man with newly diagnosed h-aTTR cardiac amyloidosis. \par \par Recent cardiac testing as above.\par \par Will start stabilizer therapy with Vyndamax 61 mg daily.\par For a patient with pathogenic genetic variant will plan to begin silencer therapy with Amvuttra as well.\par \par We discussed the pathophysiology of cardiac amyloidosis as well as the possible symptoms, treatment modalities, prognosis and necessity for genetic evaluation for first degree relatives. \par \par Given his exertional dyspnea, he will likely benefit from a low dose loop diuretic. Will begin furosemide 20 mg daily for one week and monitor for symptom improvement. He understands that this may not be a necessary medication on a daily basis, but may be used based on fluid status and symptoms.\par \par Follow up in the office in one month.

## 2023-04-19 NOTE — CARDIOLOGY SUMMARY
[de-identified] : 3/15/2023. Normal sinus rhythm at 77 BPM. Low voltage in the limb leads. Nonspecific ST and T abnormality. \par 4/12/2023. NSR at 71 BPM. LAE. Poor R-wave progression. Low voltage in the limb leads. Unchanged from prior tracing.  [de-identified] : 1/18/2023. TTE. LVEF 43%.\par 1. The entire septum is abnormal.\par 2. Left ventricular ejection fraction is mildly reduced with an LVEF of 43% by Harrington's biplant method. \par 3. There is moderate concentric left ventricular hypertrophy.\par 4. Severe (Grade 3) left ventricular diastolic dysfunction.\par 5. Moderate mitral valve regurgitation.\par 6. No significant valvular disease.\par 7. Mild thickening of the aortic valve leaflets.\par 8. No pericardial effusion seen. [de-identified] : 2/21/2023. Cardiac MRI.\par 1. Suspect cardiac amyloidosis; qualitative reduced LV/RV systolic function and global hypokineses with focal hypertrophy (next wall thickness 17 mm). \par 2. Abnormal parametric values of T1, T2 and LGE in a classic pattern for amyloidosis. \par 3. The cardiac valves and pericardium are unremarkable.\par 4. The extra cardiac is unremarkable. [de-identified] : 3/22/2023. PYP scan. Cardiac imaging study is strongly suggestive of transthyretin cardiac amyloidosis.

## 2023-04-19 NOTE — REASON FOR VISIT
[FreeTextEntry1] : 4/12/2023\par John Lozano returns today for scheduled follow up. Today he is accompanied by his daughter. \par Since last visit he has had a Tc PYP scan which was strongly suggestive of transthyretin cardiac amyloidosis. 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 \par Today he is feeling well aside from ongoing complaints of exertional dyspnea. He is concerned that if he were to push himself too hard physically, he may pass out. He continues to take no medications on a regular basis.

## 2023-04-19 NOTE — HISTORY OF PRESENT ILLNESS
[FreeTextEntry1] : John Lozano presents today for evaluation of possible cardiac amyloidosis. \par He is a 62 year old [place] man with hypertension, hyperlipidemia, and transaminitis (possibly related to [employer] testosterone supplements) and recent cardiac MRI which was suggestive of cardiac amyloidosis with reduced LV/RV systolic function, global hypokinesis and focal hypertrophy who presents today to establish care. \par \par He does report bilateral neuropathy of his hands and fingers and no spinal issues.\par He does experience exertional dyspnea if he walks too quickly, however is otherwise able to walk many blocks and climb one flight of stairs without any chest discomfort, palpitations, lightheadedness or syncope.\par \par Surgical history includes bilateral hip replacement (2007, 2020). 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 \par  \par \par \par \par

## 2023-05-10 ENCOUNTER — APPOINTMENT (OUTPATIENT)
Dept: CARDIOLOGY | Facility: CLINIC | Age: 63
End: 2023-05-10
Payer: COMMERCIAL

## 2023-05-10 ENCOUNTER — NON-APPOINTMENT (OUTPATIENT)
Age: 63
End: 2023-05-10

## 2023-05-10 ENCOUNTER — RX RENEWAL (OUTPATIENT)
Age: 63
End: 2023-05-10

## 2023-05-10 VITALS
WEIGHT: 176 LBS | DIASTOLIC BLOOD PRESSURE: 78 MMHG | BODY MASS INDEX: 26.67 KG/M2 | SYSTOLIC BLOOD PRESSURE: 118 MMHG | OXYGEN SATURATION: 98 % | HEART RATE: 70 BPM | HEIGHT: 68 IN

## 2023-05-10 PROCEDURE — 99215 OFFICE O/P EST HI 40 MIN: CPT | Mod: 25

## 2023-05-10 PROCEDURE — 93000 ELECTROCARDIOGRAM COMPLETE: CPT

## 2023-05-10 NOTE — DISCUSSION/SUMMARY
[EKG obtained to assist in diagnosis and management of assessed problem(s)] : EKG obtained to assist in diagnosis and management of assessed problem(s) [FreeTextEntry1] : He is a 62 year old Citizen of [place] man with newly diagnosed h-aTTR cardiac amyloidosis. \par \par Recent cardiac testing as above.\par \par Will start stabilizer therapy with Vyndamax 61 mg daily.\par For a patient with pathogenic genetic variant will plan to begin silencer therapy with Amvuttra as well.\par \par We discussed the pathophysiology of cardiac amyloidosis as well as the possible symptoms, treatment modalities, prognosis and necessity for genetic evaluation for first degree relatives. \par \par Given his exertional dyspnea, he will likely benefit from a low dose loop diuretic. Will try furosemide 40 mg BID for three days and then reassess. \par \par Follow up in the office in one month.

## 2023-05-10 NOTE — REASON FOR VISIT
[Other: ____] : [unfilled] [Other: _____] : [unfilled] [FreeTextEntry1] : May 2023 - Patient returns today for follow-up. He has not yet started either stabilizer or silencer therapy because they have not yet been approved. He is eager to start treating his amyloidosis.

## 2023-05-10 NOTE — HISTORY OF PRESENT ILLNESS
[FreeTextEntry1] : John Lozano presents today for evaluation of possible cardiac amyloidosis. \par He is a 62 year old [place] man with hypertension, hyperlipidemia, and transaminitis (possibly related to [employer] testosterone supplements) and recent cardiac MRI which was suggestive of cardiac amyloidosis with reduced LV/RV systolic function, global hypokinesis and focal hypertrophy who presents today to establish care. \par \par He does report bilateral neuropathy of his hands and fingers and no spinal issues.\par He does experience exertional dyspnea if he walks too quickly, however is otherwise able to walk many blocks and climb one flight of stairs without any chest discomfort, palpitations, lightheadedness or syncope.\par \par Surgical history includes bilateral hip replacement (2007, 2020). 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 \par  \par 4/12/2023\par John Lozano returns today for scheduled follow up. Today he is accompanied by his daughter. \par Since last visit he has had a Tc PYP scan which was strongly suggestive of transthyretin cardiac amyloidosis. 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 \par Today he is feeling well aside from ongoing complaints of exertional dyspnea. He is concerned that if he were to push himself too hard physically, he may pass out. He continues to take no medications on a regular basis.

## 2023-05-10 NOTE — CARDIOLOGY SUMMARY
[de-identified] : 3/15/2023. Normal sinus rhythm at 77 BPM. Low voltage in the limb leads. Nonspecific ST and T abnormality. \par 4/12/2023. NSR at 71 BPM. LAE. Poor R-wave progression. Low voltage in the limb leads. Unchanged from prior tracing.  [de-identified] : 1/18/2023. TTE. LVEF 43%.\par 1. The entire septum is abnormal.\par 2. Left ventricular ejection fraction is mildly reduced with an LVEF of 43% by Harrington's biplant method. \par 3. There is moderate concentric left ventricular hypertrophy.\par 4. Severe (Grade 3) left ventricular diastolic dysfunction.\par 5. Moderate mitral valve regurgitation.\par 6. No significant valvular disease.\par 7. Mild thickening of the aortic valve leaflets.\par 8. No pericardial effusion seen. [de-identified] : 2/21/2023. Cardiac MRI.\par 1. Suspect cardiac amyloidosis; qualitative reduced LV/RV systolic function and global hypokineses with focal hypertrophy (next wall thickness 17 mm). \par 2. Abnormal parametric values of T1, T2 and LGE in a classic pattern for amyloidosis. \par 3. The cardiac valves and pericardium are unremarkable.\par 4. The extra cardiac is unremarkable. [de-identified] : 3/22/2023. PYP scan. Cardiac imaging study is strongly suggestive of transthyretin cardiac amyloidosis.

## 2023-06-06 ENCOUNTER — NON-APPOINTMENT (OUTPATIENT)
Age: 63
End: 2023-06-06

## 2023-06-06 ENCOUNTER — APPOINTMENT (OUTPATIENT)
Dept: CARDIOLOGY | Facility: CLINIC | Age: 63
End: 2023-06-06
Payer: COMMERCIAL

## 2023-06-06 VITALS
WEIGHT: 179 LBS | OXYGEN SATURATION: 96 % | SYSTOLIC BLOOD PRESSURE: 114 MMHG | HEART RATE: 90 BPM | BODY MASS INDEX: 27.22 KG/M2 | DIASTOLIC BLOOD PRESSURE: 79 MMHG

## 2023-06-06 DIAGNOSIS — R93.1 ABNORMAL FINDINGS ON DIAGNOSTIC IMAGING OF HEART AND CORONARY CIRCULATION: ICD-10-CM

## 2023-06-06 PROCEDURE — 99215 OFFICE O/P EST HI 40 MIN: CPT | Mod: 25

## 2023-06-06 PROCEDURE — 93000 ELECTROCARDIOGRAM COMPLETE: CPT

## 2023-06-07 ENCOUNTER — APPOINTMENT (OUTPATIENT)
Dept: CARDIOLOGY | Facility: CLINIC | Age: 63
End: 2023-06-07
Payer: COMMERCIAL

## 2023-06-07 VITALS
DIASTOLIC BLOOD PRESSURE: 74 MMHG | TEMPERATURE: 98.1 F | OXYGEN SATURATION: 99 % | HEIGHT: 68 IN | BODY MASS INDEX: 27.28 KG/M2 | WEIGHT: 180 LBS | HEART RATE: 71 BPM | SYSTOLIC BLOOD PRESSURE: 107 MMHG

## 2023-06-07 LAB
ALBUMIN SERPL ELPH-MCNC: 4.1 G/DL
ALP BLD-CCNC: 113 U/L
ALT SERPL-CCNC: 35 U/L
ANION GAP SERPL CALC-SCNC: 13 MMOL/L
AST SERPL-CCNC: 29 U/L
BILIRUB SERPL-MCNC: 1 MG/DL
BUN SERPL-MCNC: 18 MG/DL
CALCIUM SERPL-MCNC: 9.5 MG/DL
CHLORIDE SERPL-SCNC: 103 MMOL/L
CO2 SERPL-SCNC: 22 MMOL/L
CREAT SERPL-MCNC: 1.16 MG/DL
EGFR: 71 ML/MIN/1.73M2
GLUCOSE SERPL-MCNC: 84 MG/DL
NT-PROBNP SERPL-MCNC: 217 PG/ML
POTASSIUM SERPL-SCNC: 4.5 MMOL/L
PROT SERPL-MCNC: 6.8 G/DL
SODIUM SERPL-SCNC: 138 MMOL/L

## 2023-06-07 PROCEDURE — 99215 OFFICE O/P EST HI 40 MIN: CPT

## 2023-06-07 NOTE — HISTORY OF PRESENT ILLNESS
[FreeTextEntry1] : WILFRDIO IRVIN 62 year M presents with HTN HLD. No interval CV complaints. Tolerating medications well. Total visit time 45min. BMI 28    /74  HR 71; 6/5/23 NSR LAE low voltage, Q 3, AVF (unchanged). 1/18/23  Echo, CLVH moderate, LAE , MR moderate, DD3; LVEF 43%.TMT not indicated at present. 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 Currently on vyndamax (tafamidis) 61mg and amvuttra (vutrisuran)  EKG  atrial enlargement, low voltage, transaminitis now documented TTR  amyloid. Macroglossia, periorbital ecchymoses bilateral nocturnal paresthesiae, ED,  orthostasis liekly sensory and autonomic neuropathy. Started on dapagliflozin, lasix 20mg, for HFpEF; . Cr 112 ALT down to 52 from 81 off testosterone supplements. Follow for LVEF , EKG improvement over time and possible withdrawal of HF meds.

## 2023-06-07 NOTE — PHYSICAL EXAM
[Well Nourished] : well nourished [No Acute Distress] : no acute distress [Normal Conjunctiva] : normal conjunctiva [Normal Venous Pressure] : normal venous pressure [No Carotid Bruit] : no carotid bruit [Normal S1, S2] : normal S1, S2 [No Murmur] : no murmur [No Rub] : no rub [No Gallop] : no gallop [Clear Lung Fields] : clear lung fields [Good Air Entry] : good air entry [No Respiratory Distress] : no respiratory distress  [Soft] : abdomen soft [Non Tender] : non-tender [No Masses/organomegaly] : no masses/organomegaly [Normal Bowel Sounds] : normal bowel sounds [Normal Gait] : normal gait [No Edema] : no edema [No Cyanosis] : no cyanosis [No Clubbing] : no clubbing [No Varicosities] : no varicosities [No Rash] : no rash [No Skin Lesions] : no skin lesions [Moves all extremities] : moves all extremities [No Focal Deficits] : no focal deficits [Normal Speech] : normal speech [Alert and Oriented] : alert and oriented [Normal memory] : normal memory [de-identified] : Macroglossia, periorbital ecchymoses [de-identified] : No periorbital ecchymosis

## 2023-06-17 NOTE — HISTORY OF PRESENT ILLNESS
[FreeTextEntry1] : John Lozano presents today for evaluation of possible cardiac amyloidosis. \par He is a 62 year old Maur[place] man with hypertension, hyperlipidemia, and transaminitis (possibly related to [employer] testosterone supplements) and recent cardiac MRI which was suggestive of cardiac amyloidosis with reduced LV/RV systolic function, global hypokinesis and focal hypertrophy who presents today to establish care. \par \par He does report bilateral neuropathy of his hands and fingers and no spinal issues.\par He does experience exertional dyspnea if he walks too quickly, however is otherwise able to walk many blocks and climb one flight of stairs without any chest discomfort, palpitations, lightheadedness or syncope.\par \par Surgical history includes bilateral hip replacement (2007, 2020). 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 \par  \par 4/12/2023\par John Lozano returns today for scheduled follow up. Today he is accompanied by his daughter. \par Since last visit he has had a Tc PYP scan which was strongly suggestive of transthyretin cardiac amyloidosis. 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 \par Today he is feeling well aside from ongoing complaints of exertional dyspnea. He is concerned that if he were to push himself too hard physically, he may pass out. He continues to take no medications on a regular basis. \par \par May 2023 - Patient returns today for follow-up. He has not yet started either stabilizer or silencer therapy because they have not yet been approved. He is eager to start treating his amyloidosis.

## 2023-06-17 NOTE — DISCUSSION/SUMMARY
[EKG obtained to assist in diagnosis and management of assessed problem(s)] : EKG obtained to assist in diagnosis and management of assessed problem(s) [FreeTextEntry1] : He is a 63 year old [place] man with newly diagnosed hATTR cardiac amyloidosis. \par \par Recent cardiac testing as above.\par \par Will start stabilizer therapy with Vyndamax 61 mg daily.\par For a patient with pathogenic genetic variant will plan to begin silencer therapy with Amvuttra as well.\par \par We discussed the pathophysiology of cardiac amyloidosis as well as the possible symptoms, treatment modalities, prognosis and necessity for genetic evaluation for first degree relatives. \par \par Given his exertional dyspnea, he will likely benefit from a low dose loop diuretic. Will try furosemide 40 mg BID for three days followed by 20 mg daily thereafter. Will begin Farxiga 10mg daily for additive diuretic effect in a patient with reduced EF. \par \par He has been encouraged to keep a daily weight log and will notify the office for any weight gain > 3 pounds. Labs today. \par \par Follow up in the office in one month.
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
## 2023-06-17 NOTE — CARDIOLOGY SUMMARY
[de-identified] : 3/15/2023. Normal sinus rhythm at 77 BPM. Low voltage in the limb leads. Nonspecific ST and T abnormality. \par 4/12/2023. NSR at 71 BPM. LAE. Poor R-wave progression. Low voltage in the limb leads. Unchanged from prior tracing.  [de-identified] : 1/18/2023. TTE. LVEF 43%.\par 1. The entire septum is abnormal.\par 2. Left ventricular ejection fraction is mildly reduced with an LVEF of 43% by Harrington's biplant method. \par 3. There is moderate concentric left ventricular hypertrophy.\par 4. Severe (Grade 3) left ventricular diastolic dysfunction.\par 5. Moderate mitral valve regurgitation.\par 6. No significant valvular disease.\par 7. Mild thickening of the aortic valve leaflets.\par 8. No pericardial effusion seen. [de-identified] : 2/21/2023. Cardiac MRI.\par 1. Suspect cardiac amyloidosis; qualitative reduced LV/RV systolic function and global hypokineses with focal hypertrophy (next wall thickness 17 mm). \par 2. Abnormal parametric values of T1, T2 and LGE in a classic pattern for amyloidosis. \par 3. The cardiac valves and pericardium are unremarkable.\par 4. The extra cardiac is unremarkable. [de-identified] : 3/22/2023. PYP scan. Cardiac imaging study is strongly suggestive of transthyretin cardiac amyloidosis.

## 2023-07-10 ENCOUNTER — TRANSCRIPTION ENCOUNTER (OUTPATIENT)
Age: 63
End: 2023-07-10

## 2023-07-10 ENCOUNTER — APPOINTMENT (OUTPATIENT)
Dept: NEUROSURGERY | Facility: HOSPITAL | Age: 63
End: 2023-07-10

## 2023-07-10 ENCOUNTER — INPATIENT (INPATIENT)
Facility: HOSPITAL | Age: 63
LOS: 3 days | Discharge: NURSING, REHAB OR LONG-TERM CARE | DRG: 23 | End: 2023-07-14
Attending: PSYCHIATRY & NEUROLOGY | Admitting: PSYCHIATRY & NEUROLOGY
Payer: COMMERCIAL

## 2023-07-10 VITALS
DIASTOLIC BLOOD PRESSURE: 75 MMHG | SYSTOLIC BLOOD PRESSURE: 109 MMHG | RESPIRATION RATE: 18 BRPM | HEART RATE: 82 BPM | OXYGEN SATURATION: 95 % | TEMPERATURE: 98 F

## 2023-07-10 DIAGNOSIS — Z98.890 OTHER SPECIFIED POSTPROCEDURAL STATES: Chronic | ICD-10-CM

## 2023-07-10 DIAGNOSIS — I63.9 CEREBRAL INFARCTION, UNSPECIFIED: ICD-10-CM

## 2023-07-10 LAB
ALBUMIN SERPL ELPH-MCNC: 3.7 G/DL — SIGNIFICANT CHANGE UP (ref 3.3–5)
ALP SERPL-CCNC: 125 U/L — HIGH (ref 40–120)
ALT FLD-CCNC: 41 U/L — SIGNIFICANT CHANGE UP (ref 10–45)
ANION GAP SERPL CALC-SCNC: 10 MMOL/L — SIGNIFICANT CHANGE UP (ref 5–17)
ANION GAP SERPL CALC-SCNC: 11 MMOL/L — SIGNIFICANT CHANGE UP (ref 5–17)
ANION GAP SERPL CALC-SCNC: 12 MMOL/L — SIGNIFICANT CHANGE UP (ref 5–17)
APPEARANCE UR: CLEAR — SIGNIFICANT CHANGE UP
APTT BLD: 24.8 SEC — LOW (ref 27.5–35.5)
APTT BLD: 25.6 SEC — LOW (ref 27.5–35.5)
AST SERPL-CCNC: 52 U/L — HIGH (ref 10–40)
BASE EXCESS BLDV CALC-SCNC: 2.7 MMOL/L — SIGNIFICANT CHANGE UP (ref -2–3)
BASOPHILS # BLD AUTO: 0.02 K/UL — SIGNIFICANT CHANGE UP (ref 0–0.2)
BASOPHILS # BLD AUTO: 0.05 K/UL — SIGNIFICANT CHANGE UP (ref 0–0.2)
BASOPHILS NFR BLD AUTO: 0.2 % — SIGNIFICANT CHANGE UP (ref 0–2)
BASOPHILS NFR BLD AUTO: 0.5 % — SIGNIFICANT CHANGE UP (ref 0–2)
BILIRUB SERPL-MCNC: 1.2 MG/DL — SIGNIFICANT CHANGE UP (ref 0.2–1.2)
BILIRUB UR-MCNC: NEGATIVE — SIGNIFICANT CHANGE UP
BUN SERPL-MCNC: 15 MG/DL — SIGNIFICANT CHANGE UP (ref 7–23)
BUN SERPL-MCNC: 17 MG/DL — SIGNIFICANT CHANGE UP (ref 7–23)
BUN SERPL-MCNC: 17 MG/DL — SIGNIFICANT CHANGE UP (ref 7–23)
CA-I SERPL-SCNC: 1.19 MMOL/L — SIGNIFICANT CHANGE UP (ref 1.15–1.33)
CALCIUM SERPL-MCNC: 9 MG/DL — SIGNIFICANT CHANGE UP (ref 8.4–10.5)
CALCIUM SERPL-MCNC: 9.5 MG/DL — SIGNIFICANT CHANGE UP (ref 8.4–10.5)
CALCIUM SERPL-MCNC: 9.6 MG/DL — SIGNIFICANT CHANGE UP (ref 8.4–10.5)
CHLORIDE BLDV-SCNC: 104 MMOL/L — SIGNIFICANT CHANGE UP (ref 96–108)
CHLORIDE SERPL-SCNC: 105 MMOL/L — SIGNIFICANT CHANGE UP (ref 96–108)
CHLORIDE SERPL-SCNC: 106 MMOL/L — SIGNIFICANT CHANGE UP (ref 96–108)
CHLORIDE SERPL-SCNC: 108 MMOL/L — SIGNIFICANT CHANGE UP (ref 96–108)
CO2 BLDV-SCNC: 29 MMOL/L — HIGH (ref 22–26)
CO2 SERPL-SCNC: 18 MMOL/L — LOW (ref 22–31)
CO2 SERPL-SCNC: 19 MMOL/L — LOW (ref 22–31)
CO2 SERPL-SCNC: 21 MMOL/L — LOW (ref 22–31)
COLOR SPEC: SIGNIFICANT CHANGE UP
CREAT SERPL-MCNC: 0.91 MG/DL — SIGNIFICANT CHANGE UP (ref 0.5–1.3)
CREAT SERPL-MCNC: 0.93 MG/DL — SIGNIFICANT CHANGE UP (ref 0.5–1.3)
CREAT SERPL-MCNC: 0.99 MG/DL — SIGNIFICANT CHANGE UP (ref 0.5–1.3)
DIFF PNL FLD: NEGATIVE — SIGNIFICANT CHANGE UP
EGFR: 86 ML/MIN/1.73M2 — SIGNIFICANT CHANGE UP
EGFR: 93 ML/MIN/1.73M2 — SIGNIFICANT CHANGE UP
EGFR: 95 ML/MIN/1.73M2 — SIGNIFICANT CHANGE UP
EOSINOPHIL # BLD AUTO: 0 K/UL — SIGNIFICANT CHANGE UP (ref 0–0.5)
EOSINOPHIL # BLD AUTO: 0.12 K/UL — SIGNIFICANT CHANGE UP (ref 0–0.5)
EOSINOPHIL NFR BLD AUTO: 0 % — SIGNIFICANT CHANGE UP (ref 0–6)
EOSINOPHIL NFR BLD AUTO: 1.3 % — SIGNIFICANT CHANGE UP (ref 0–6)
GAS PNL BLDV: 126 MMOL/L — LOW (ref 136–145)
GAS PNL BLDV: SIGNIFICANT CHANGE UP
GAS PNL BLDV: SIGNIFICANT CHANGE UP
GLUCOSE BLDV-MCNC: 104 MG/DL — HIGH (ref 70–99)
GLUCOSE SERPL-MCNC: 101 MG/DL — HIGH (ref 70–99)
GLUCOSE SERPL-MCNC: 121 MG/DL — HIGH (ref 70–99)
GLUCOSE SERPL-MCNC: 91 MG/DL — SIGNIFICANT CHANGE UP (ref 70–99)
GLUCOSE UR QL: NEGATIVE — SIGNIFICANT CHANGE UP
HCO3 BLDV-SCNC: 28 MMOL/L — SIGNIFICANT CHANGE UP (ref 22–29)
HCT VFR BLD CALC: 43.7 % — SIGNIFICANT CHANGE UP (ref 39–50)
HCT VFR BLD CALC: 45.9 % — HIGH (ref 34.5–45)
HCT VFR BLDA CALC: 45 % — SIGNIFICANT CHANGE UP (ref 34.5–46.5)
HGB BLD CALC-MCNC: 15.1 G/DL — SIGNIFICANT CHANGE UP (ref 11.7–16.1)
HGB BLD-MCNC: 13.9 G/DL — SIGNIFICANT CHANGE UP (ref 13–17)
HGB BLD-MCNC: 14.7 G/DL — SIGNIFICANT CHANGE UP (ref 11.5–15.5)
IMM GRANULOCYTES NFR BLD AUTO: 0.3 % — SIGNIFICANT CHANGE UP (ref 0–0.9)
IMM GRANULOCYTES NFR BLD AUTO: 0.4 % — SIGNIFICANT CHANGE UP (ref 0–0.9)
INR BLD: 1.07 RATIO — SIGNIFICANT CHANGE UP (ref 0.88–1.16)
INR BLD: 1.08 RATIO — SIGNIFICANT CHANGE UP (ref 0.88–1.16)
KETONES UR-MCNC: NEGATIVE — SIGNIFICANT CHANGE UP
LACTATE BLDV-MCNC: 1.9 MMOL/L — SIGNIFICANT CHANGE UP (ref 0.5–2)
LEUKOCYTE ESTERASE UR-ACNC: NEGATIVE — SIGNIFICANT CHANGE UP
LYMPHOCYTES # BLD AUTO: 0.59 K/UL — LOW (ref 1–3.3)
LYMPHOCYTES # BLD AUTO: 1.74 K/UL — SIGNIFICANT CHANGE UP (ref 1–3.3)
LYMPHOCYTES # BLD AUTO: 18.5 % — SIGNIFICANT CHANGE UP (ref 13–44)
LYMPHOCYTES # BLD AUTO: 5.6 % — LOW (ref 13–44)
MAGNESIUM SERPL-MCNC: 1.9 MG/DL — SIGNIFICANT CHANGE UP (ref 1.6–2.6)
MCHC RBC-ENTMCNC: 28.6 PG — SIGNIFICANT CHANGE UP (ref 27–34)
MCHC RBC-ENTMCNC: 29.4 PG — SIGNIFICANT CHANGE UP (ref 27–34)
MCHC RBC-ENTMCNC: 31.8 GM/DL — LOW (ref 32–36)
MCHC RBC-ENTMCNC: 32 GM/DL — SIGNIFICANT CHANGE UP (ref 32–36)
MCV RBC AUTO: 89.9 FL — SIGNIFICANT CHANGE UP (ref 80–100)
MCV RBC AUTO: 91.8 FL — SIGNIFICANT CHANGE UP (ref 80–100)
MONOCYTES # BLD AUTO: 0.16 K/UL — SIGNIFICANT CHANGE UP (ref 0–0.9)
MONOCYTES # BLD AUTO: 0.84 K/UL — SIGNIFICANT CHANGE UP (ref 0–0.9)
MONOCYTES NFR BLD AUTO: 1.5 % — LOW (ref 2–14)
MONOCYTES NFR BLD AUTO: 8.9 % — SIGNIFICANT CHANGE UP (ref 2–14)
NEUTROPHILS # BLD AUTO: 6.62 K/UL — SIGNIFICANT CHANGE UP (ref 1.8–7.4)
NEUTROPHILS # BLD AUTO: 9.77 K/UL — HIGH (ref 1.8–7.4)
NEUTROPHILS NFR BLD AUTO: 70.4 % — SIGNIFICANT CHANGE UP (ref 43–77)
NEUTROPHILS NFR BLD AUTO: 92.4 % — HIGH (ref 43–77)
NITRITE UR-MCNC: NEGATIVE — SIGNIFICANT CHANGE UP
NRBC # BLD: 0 /100 WBCS — SIGNIFICANT CHANGE UP (ref 0–0)
NRBC # BLD: 0 /100 WBCS — SIGNIFICANT CHANGE UP (ref 0–0)
PCO2 BLDV: 44 MMHG — HIGH (ref 39–42)
PH BLDV: 7.41 — SIGNIFICANT CHANGE UP (ref 7.32–7.43)
PH UR: 7.5 — SIGNIFICANT CHANGE UP (ref 5–8)
PHOSPHATE SERPL-MCNC: 3 MG/DL — SIGNIFICANT CHANGE UP (ref 2.5–4.5)
PLATELET # BLD AUTO: 217 K/UL — SIGNIFICANT CHANGE UP (ref 150–400)
PLATELET # BLD AUTO: 242 K/UL — SIGNIFICANT CHANGE UP (ref 150–400)
PO2 BLDV: 41 MMHG — SIGNIFICANT CHANGE UP (ref 25–45)
POTASSIUM BLDV-SCNC: 7.6 MMOL/L — CRITICAL HIGH (ref 3.5–5.1)
POTASSIUM SERPL-MCNC: 4 MMOL/L — SIGNIFICANT CHANGE UP (ref 3.5–5.3)
POTASSIUM SERPL-MCNC: 4.3 MMOL/L — SIGNIFICANT CHANGE UP (ref 3.5–5.3)
POTASSIUM SERPL-MCNC: 6.6 MMOL/L — CRITICAL HIGH (ref 3.5–5.3)
POTASSIUM SERPL-SCNC: 4 MMOL/L — SIGNIFICANT CHANGE UP (ref 3.5–5.3)
POTASSIUM SERPL-SCNC: 4.3 MMOL/L — SIGNIFICANT CHANGE UP (ref 3.5–5.3)
POTASSIUM SERPL-SCNC: 6.6 MMOL/L — CRITICAL HIGH (ref 3.5–5.3)
PROT SERPL-MCNC: 7.4 G/DL — SIGNIFICANT CHANGE UP (ref 6–8.3)
PROT UR-MCNC: SIGNIFICANT CHANGE UP
PROTHROM AB SERPL-ACNC: 12.3 SEC — SIGNIFICANT CHANGE UP (ref 10.5–13.4)
PROTHROM AB SERPL-ACNC: 12.4 SEC — SIGNIFICANT CHANGE UP (ref 10.5–13.4)
RBC # BLD: 4.86 M/UL — SIGNIFICANT CHANGE UP (ref 4.2–5.8)
RBC # BLD: 5 M/UL — SIGNIFICANT CHANGE UP (ref 3.8–5.2)
RBC # FLD: 12.7 % — SIGNIFICANT CHANGE UP (ref 10.3–14.5)
RBC # FLD: 13 % — SIGNIFICANT CHANGE UP (ref 10.3–14.5)
SAO2 % BLDV: 67.7 % — SIGNIFICANT CHANGE UP (ref 67–88)
SODIUM SERPL-SCNC: 135 MMOL/L — SIGNIFICANT CHANGE UP (ref 135–145)
SODIUM SERPL-SCNC: 137 MMOL/L — SIGNIFICANT CHANGE UP (ref 135–145)
SODIUM SERPL-SCNC: 138 MMOL/L — SIGNIFICANT CHANGE UP (ref 135–145)
SP GR SPEC: 1.03 — HIGH (ref 1.01–1.02)
TROPONIN T, HIGH SENSITIVITY RESULT: 24 NG/L — SIGNIFICANT CHANGE UP (ref 0–51)
UROBILINOGEN FLD QL: NEGATIVE — SIGNIFICANT CHANGE UP
WBC # BLD: 10.57 K/UL — HIGH (ref 3.8–10.5)
WBC # BLD: 9.41 K/UL — SIGNIFICANT CHANGE UP (ref 3.8–10.5)
WBC # FLD AUTO: 10.57 K/UL — HIGH (ref 3.8–10.5)
WBC # FLD AUTO: 9.41 K/UL — SIGNIFICANT CHANGE UP (ref 3.8–10.5)

## 2023-07-10 PROCEDURE — 99291 CRITICAL CARE FIRST HOUR: CPT

## 2023-07-10 PROCEDURE — 61645 PERQ ART M-THROMBECT &/NFS: CPT | Mod: RT

## 2023-07-10 PROCEDURE — 99292 CRITICAL CARE ADDL 30 MIN: CPT

## 2023-07-10 PROCEDURE — 71045 X-RAY EXAM CHEST 1 VIEW: CPT | Mod: 26

## 2023-07-10 RX ORDER — ONDANSETRON 8 MG/1
4 TABLET, FILM COATED ORAL ONCE
Refills: 0 | Status: DISCONTINUED | OUTPATIENT
Start: 2023-07-10 | End: 2023-07-14

## 2023-07-10 RX ORDER — ACETAMINOPHEN 500 MG
1000 TABLET ORAL ONCE
Refills: 0 | Status: COMPLETED | OUTPATIENT
Start: 2023-07-10 | End: 2023-07-10

## 2023-07-10 RX ORDER — MAGNESIUM SULFATE 500 MG/ML
1 VIAL (ML) INJECTION ONCE
Refills: 0 | Status: COMPLETED | OUTPATIENT
Start: 2023-07-10 | End: 2023-07-11

## 2023-07-10 RX ORDER — SODIUM CHLORIDE 9 MG/ML
1000 INJECTION INTRAMUSCULAR; INTRAVENOUS; SUBCUTANEOUS ONCE
Refills: 0 | Status: COMPLETED | OUTPATIENT
Start: 2023-07-10 | End: 2023-07-10

## 2023-07-10 RX ORDER — CHLORHEXIDINE GLUCONATE 213 G/1000ML
1 SOLUTION TOPICAL DAILY
Refills: 0 | Status: DISCONTINUED | OUTPATIENT
Start: 2023-07-10 | End: 2023-07-12

## 2023-07-10 RX ORDER — ATORVASTATIN CALCIUM 80 MG/1
40 TABLET, FILM COATED ORAL AT BEDTIME
Refills: 0 | Status: DISCONTINUED | OUTPATIENT
Start: 2023-07-10 | End: 2023-07-12

## 2023-07-10 RX ORDER — SENNA PLUS 8.6 MG/1
2 TABLET ORAL AT BEDTIME
Refills: 0 | Status: DISCONTINUED | OUTPATIENT
Start: 2023-07-10 | End: 2023-07-14

## 2023-07-10 RX ORDER — SODIUM CHLORIDE 9 MG/ML
1000 INJECTION INTRAMUSCULAR; INTRAVENOUS; SUBCUTANEOUS
Refills: 0 | Status: DISCONTINUED | OUTPATIENT
Start: 2023-07-10 | End: 2023-07-11

## 2023-07-10 RX ORDER — HYDROMORPHONE HYDROCHLORIDE 2 MG/ML
0.25 INJECTION INTRAMUSCULAR; INTRAVENOUS; SUBCUTANEOUS
Refills: 0 | Status: DISCONTINUED | OUTPATIENT
Start: 2023-07-10 | End: 2023-07-10

## 2023-07-10 RX ORDER — HYDROMORPHONE HYDROCHLORIDE 2 MG/ML
0.5 INJECTION INTRAMUSCULAR; INTRAVENOUS; SUBCUTANEOUS
Refills: 0 | Status: DISCONTINUED | OUTPATIENT
Start: 2023-07-10 | End: 2023-07-10

## 2023-07-10 RX ORDER — OXYCODONE HYDROCHLORIDE 5 MG/1
5 TABLET ORAL EVERY 4 HOURS
Refills: 0 | Status: DISCONTINUED | OUTPATIENT
Start: 2023-07-10 | End: 2023-07-14

## 2023-07-10 RX ORDER — OXYCODONE HYDROCHLORIDE 5 MG/1
10 TABLET ORAL EVERY 4 HOURS
Refills: 0 | Status: DISCONTINUED | OUTPATIENT
Start: 2023-07-10 | End: 2023-07-14

## 2023-07-10 RX ORDER — POLYETHYLENE GLYCOL 3350 17 G/17G
17 POWDER, FOR SOLUTION ORAL DAILY
Refills: 0 | Status: DISCONTINUED | OUTPATIENT
Start: 2023-07-10 | End: 2023-07-14

## 2023-07-10 RX ORDER — ACETAMINOPHEN 500 MG
650 TABLET ORAL EVERY 6 HOURS
Refills: 0 | Status: DISCONTINUED | OUTPATIENT
Start: 2023-07-10 | End: 2023-07-14

## 2023-07-10 RX ADMIN — SODIUM CHLORIDE 50 MILLILITER(S): 9 INJECTION INTRAMUSCULAR; INTRAVENOUS; SUBCUTANEOUS at 17:55

## 2023-07-10 RX ADMIN — OXYCODONE HYDROCHLORIDE 10 MILLIGRAM(S): 5 TABLET ORAL at 22:40

## 2023-07-10 RX ADMIN — Medication 1000 MILLIGRAM(S): at 20:00

## 2023-07-10 RX ADMIN — SODIUM CHLORIDE 1000 MILLILITER(S): 9 INJECTION INTRAMUSCULAR; INTRAVENOUS; SUBCUTANEOUS at 13:49

## 2023-07-10 RX ADMIN — POLYETHYLENE GLYCOL 3350 17 GRAM(S): 17 POWDER, FOR SOLUTION ORAL at 22:39

## 2023-07-10 RX ADMIN — SENNA PLUS 2 TABLET(S): 8.6 TABLET ORAL at 22:38

## 2023-07-10 RX ADMIN — ATORVASTATIN CALCIUM 40 MILLIGRAM(S): 80 TABLET, FILM COATED ORAL at 22:38

## 2023-07-10 RX ADMIN — CHLORHEXIDINE GLUCONATE 1 APPLICATION(S): 213 SOLUTION TOPICAL at 22:39

## 2023-07-10 RX ADMIN — OXYCODONE HYDROCHLORIDE 10 MILLIGRAM(S): 5 TABLET ORAL at 23:20

## 2023-07-10 RX ADMIN — Medication 400 MILLIGRAM(S): at 19:30

## 2023-07-10 NOTE — DISCHARGE NOTE NURSING/CASE MANAGEMENT/SOCIAL WORK - PATIENT PORTAL LINK FT
You can access the FollowMyHealth Patient Portal offered by [facility] by registering at the following website: [email or web address]. By joining [facility]’s FollowMyHealth portal, you will also be able to view your health information using other applications (apps) compatible with our system.

## 2023-07-10 NOTE — ED PROVIDER NOTE - ATTENDING CONTRIBUTION TO CARE
Dr. Scott (Attending Physician)  I performed a history and physical exam of the patient and discussed their management with the resident. I reviewed the resident's note and agree with the documented findings and plan of care. My medical decision making and observations are found above.

## 2023-07-10 NOTE — ED PROVIDER NOTE - NS ED MD DISPO SPECIAL CONSIDERATION1
Patient Education     Noninfectious Gastroenteritis (Adult)    Gastroenteritis can cause nausea, vomiting, diarrhea, and cramping in the belly. This may occur from food sensitivity, inflammation of your gastrointestinal tract, medicines, stress, or other causes not related to infection. Your symptoms will usually last from 1 to 3 days, but can last longer. Antibiotics are not effective, but simple home treatment will be helpful.  Home care  Medicine  · You may use acetaminophen or NSAID medicines like ibuprofen or naproxen to control fever, unless another medicine is prescribed. (Note: If you have chronic liver or kidney disease, or ever had a stomach ulcer or gastrointestinaI bleeding, talk with your healthcare provider before using these medicines.) Aspirin should never be used in anyone under 18 years of age who is ill with a fever. It may cause severe liver damage. Don't increase your NSAID medicines if you are already taking these medicines for another condition (like arthritis). Don't use NSAIDS if you are on aspirin (such as for heart disease, or after a stroke).  · If medicines for diarrhea or vomiting are prescribed, take only as directed.  General care and preventing spread of the illness  · If symptoms are severe, rest at home for the next 24 hours or until you feel better.  · Hand washing with soap and water is the best way to prevent the spread of infection. Wash your hands after touching anyone who is sick.  · Wash your hands after using the toilet and before meals. Clean the toilet after each use.  · Caffeine, tobacco, and alcohol can make your diarrhea, cramping, and pain worse.  Diet  · Water and clear liquids are important so you do not get dehydrated. Drink a small amount at a time.  · Don't force yourself to eat, especially if you have cramps, vomiting, or diarrhea. When you finally decide to start eating, do not eat large amounts at a time, even if you are hungry.  · If you eat, avoid fatty,  greasy, spicy, or fried foods.  · Don't eat dairy products if you have diarrhea; they can make the diarrhea worse.  During the first 24 hours (the first full day), follow the diet below:  · Beverages: Water, clear liquids, soft drinks without caffeine, like ginger ale; mineral water (plain or flavored); decaffeinated tea and coffee.  · Soups: Clear broth, consommé, and bouillon sports drinks aren't a good choice because they have too much sugar and not enough electrolytes. In this case, commercially available products called oral rehydration solutions are best.  · Desserts: Plain gelatin, ice pops, and fruit juice bars  During the next 24 hours (the second day), you may add the following to the above if you have improved. If not, continue what you did the first day:  · Hot cereal, plain toast, bread, rolls, crackers  · Plain noodles, rice, mashed potatoes, chicken noodle or rice soup  · Unsweetened canned fruit and bananas (don't eat pineapple or citrus)  · Limit caffeine and chocolate. No spices or seasonings except salt.  During the next 24 hours  · Gradually resume a normal diet, as you feel better and your symptoms improve.  · If at any time your symptoms start getting worse, go back to clear liquids until you feel better.    Food preparation  · If you have diarrhea, you should not prepare food for others. When you  prepare food for yourself, wash your hands before and after.  · Wash your hands after using cutting boards, countertops, and knives that have been in contact with raw food.  · Keep uncooked meats away from cooked and ready-to-eat foods.    Follow-up care  Follow up with your healthcare provider if you are not improving over the next 2 to 3 days, or as advised. If a stool (diarrhea) sample was taken, call for the results as directed.  Call 911  Call 911 if any of these occur:  · Trouble breathing  · Chest pain  · Confusion  · Severe drowsiness or trouble awakening  · Seizure  · Stiff neck  When to  seek medical advice  Call your healthcare provider right away if any of these occur:   · Increasing belly pain or constant lower right belly pain  · Continued vomiting (unable to keep liquids down)  · Frequent diarrhea (more than 5 times a day)  · Blood in vomit or stool (black or red color)  · Inability to tolerate solid food after a few days.  · Dark urine, reduced urine output  · Weakness, dizziness  · Drowsiness  · Fever of 100.4ºF (38.0ºC) or higher, or as directed by your healthcare provider  · New rash  [employer] last reviewed this educational content on 3/1/2018  © 5951-6293 [employer]. pregnant or trying to get pregnant  · mouth  · headache  · nausea, vomiting  · stomach pain  · urine discoloration  This list may not describe all possible side effects. Call your doctor for medical advice about side effects. You may report side effects to FDA at 9-267-FJC-0194.  Where should I keep my medicine?  Keep out of the reach of children.  Store at room temperature between 15 and 30 degrees C (59 and 86 degrees F). Do not freeze. Keep container tightly closed. Throw away any unused medicine after the expiration date.  NOTE:This sheet is a summary. It may not cover all possible information. If you have questions about this medicine, talk to your doctor, pharmacist, or health care provider. Copyright© 2016 [employer]            None

## 2023-07-10 NOTE — PRE-ANESTHESIA EVALUATION ADULT - NSANTHAIRWAYFT_ENT_ALL_CORE
FROM  Interincisor distance <2 finger breadths  Neck circumference <17cm  TM distance >2 finger breadths

## 2023-07-10 NOTE — ED ADULT NURSE NOTE - BREATH SOUNDS, RIGHT
[Dear  ___] : Dear  [unfilled], [Consult Letter:] : I had the pleasure of evaluating your patient, [unfilled]. [Please see my note below.] : Please see my note below. [Sincerely,] : Sincerely, [FreeTextEntry3] : Harpreet Baker MD, FACS\par  clear

## 2023-07-10 NOTE — DISCHARGE NOTE NURSING/CASE MANAGEMENT/SOCIAL WORK - NSDCPEFALRISK_GEN_ALL_CORE
For information on Fall & Injury Prevention, visit: [email or web address] OR  [email or web address] OR  https://www.cdc.gov/steadi/patient.html

## 2023-07-10 NOTE — ED PROVIDER NOTE - PHYSICAL EXAMINATION
GENERAL: Awake, alert, NAD  HEENT: NC/AT, moist mucous membranes, EOMI  LUNGS: CTAB, no wheezes or crackles   CARDIAC: RRR, no m/r/g  ABDOMEN: Soft, non tender, non distended, no rebound, no guarding  BACK: No midline spinal tenderness  EXT: No edema, no calf tenderness, no deformities.  NEURO: A&Ox3. Moving all extremities. L sided upper ext drift. weakness noted to L sided extremities. dysarthria noted while speaking  SKIN: Warm and dry. No rash.  PSYCH: Normal affect.

## 2023-07-10 NOTE — PRE-ANESTHESIA EVALUATION ADULT - NSANTHPMHFT_GEN_ALL_CORE
CVA  Amyloidosis  Mod MR  Severe LV dysfunction EF 43%  HTN CVA  Amyloidosis  Mod MR  Severe LV dysfunction EF 43%  HTN    Meds:  Lasix  Sildenafil    NKDA

## 2023-07-10 NOTE — PATIENT PROFILE ADULT - FALL HARM RISK - HARM RISK INTERVENTIONS

## 2023-07-10 NOTE — H&P ADULT - ASSESSMENT
Patient John Lozano is a 64yo R handed M PMHx recent amyloidosis diagnosis presents to ED for dysarthria, L hemiparesis. Patient accompanied by daughter and wife who all state LKW 9PM 7/9/23. Patient during yesterday felt generally tired as he does from amyloidosis diagnosis but was able to ambulate to [employer], go to the beach. Then after 9PM last night, he states he might have felt a little weak in his "R leg" (but suspect L leg given exam) and slipped in the bathroom and was down for <1 hour and found by spouse. Then today AM when daughter returned from [place], she noted he was slurring with weakness for which he came to ED. Not on ac/ ap agents. Here, NIHSS 9. preMRS 0. VS 98.2F, HR82, /75, RR18, 95%RA. Exam with dysarthria, R gaze deviation (able to cross midline), L hemiparesis, inattention to L side.     LKW: 9PM 7/9/23  NIHSS: 9   Baseline MRS: 0  Not a tenecteplase candidate due to outside window from LKW.   Was Thrombectomy candidate as he was with disabling deficits, R distal M1 MCA occlusion.    CT head: Acute Rt temporal parietal infarct without hemorrhagic conversion.  CTA: Bilateral internal carotid artery fibromuscular dysplasia. Right distal M1 occlusion.  CTP: Core infarct in the right parietal temporal region of 23 mL with a penumbra of 104 mL.    Impression: dysarthia, R gaze deviation, LHH, L hemiparesis from R MCA territory ischemic stroke with R distal M1 occlusion. Mechanism Embolic stroke of undetermined source.    Plan:  [ ] endovascular therapy  [ ]  hgA1c, lipid profile, cbc, cmp mag/phos,   [ ] BP goals 140/90 post endovascular or otherwise deemed per neuro-interventional radiology.    [ ] aspirin 81mg PO or 300mg suppository, when to start will be determined after thrombectomy, lipitor 80mg qhs (LDL goal <70), insulin regimen (BG goal 120-180)  [ ] repeat Head CT in 24hrs evaluate for hemorrhagic conversion or earlier for change in mental status  [ ] MRI brain w/o cont  [ ] TTE w/ bubble study  [ ] tele, possible loop recorder upon d/c    - telemetry monitoring   - neuro checks q1hr in ICU Unit- NPO x 24hrs or otherwise deemed per neuro-interventional radiology  - dysphagia screen, Speech and Swallow Evaluation if failed dysphagia screen  - PT/OT after 24 hours  - DVT PPx: SCDs for 24 hours, then start chemical AC if stability scan without hemorrhage  - BP goals 140/90 post endovascular or otherwise deemed per neuro-interventional radiology   - aspiration, fall precautions    Discussed with stroke fellow Dr Charlie Nickerson and under supervision of attending Dr Paola Pereyra regarding decision against candidacy for tenecteplase and decision for thrombectomy and above recommendations/plan. Delay in note entry/ note updates is due to patient care. Discussed patient care and above plan/recommendations with patient, daughter, spouse bedside at time of code stroke. Opportunity for questions offered. Plan will be finalized/amended once signed by attending.   Patient John Lozano is a 62yo R handed M PMHx recent amyloidosis diagnosis presents to ED for dysarthria, L hemiparesis. Patient accompanied by daughter and wife who all state LKW 9PM 7/9/23. Patient during yesterday felt generally tired as he does from amyloidosis diagnosis but was able to ambulate to [employer], go to the beach. Then after 9PM last night, he states he might have felt a little weak in his "R leg" (but suspect L leg given exam) and slipped in the bathroom and was down for <1 hour and found by spouse. Then today AM when daughter returned from [place], she noted he was slurring with weakness for which he came to ED. Not on ac/ ap agents. Here, NIHSS 9. preMRS 0. VS 98.2F, HR82, /75, RR18, 95%RA. Exam with dysarthria, R gaze deviation (able to cross midline), L hemiparesis, inattention to L side.     LKW: 9PM 7/9/23  NIHSS: 9   Baseline MRS: 0  Not a tenecteplase candidate due to outside window from LKW.   Was Thrombectomy candidate as he was with disabling deficits, R distal M1 MCA occlusion.    CT head: Acute Rt temporal parietal infarct without hemorrhagic conversion.  CTA: Bilateral internal carotid artery fibromuscular dysplasia. Right distal M1 occlusion.  CTP: Core infarct in the right parietal temporal region of 23 mL with a penumbra of 104 mL.    Impression: dysarthia, R gaze deviation, LHH, L hemiparesis from R MCA territory ischemic stroke with R distal M1 occlusion. Mechanism Embolic stroke of undetermined source. 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     Plan:  [ ] endovascular therapy  [ ]  hgA1c, lipid profile, cbc, cmp mag/phos,   --> hyperkalemia noted in ED, will recheck given hemolyzed.   [ ] BP goals 140/90 post endovascular or otherwise deemed per neuro-interventional radiology.    [ ] aspirin 81mg PO or 300mg suppository, when to start will be determined after thrombectomy, lipitor 80mg qhs (LDL goal <70), insulin regimen (BG goal 120-180)  [ ] repeat Head CT in 24hrs evaluate for hemorrhagic conversion or earlier for change in mental status  [ ] MRI brain w/o cont  [ ] TTE w/ bubble study  [ ] tele, possible loop recorder upon d/c    - telemetry monitoring   - neuro checks q1hr in ICU Unit- NPO x 24hrs or otherwise deemed per neuro-interventional radiology  - dysphagia screen, Speech and Swallow Evaluation if failed dysphagia screen  - PT/OT after 24 hours  - DVT PPx: SCDs for 24 hours, then start chemical AC if stability scan without hemorrhage  - BP goals 140/90 post endovascular or otherwise deemed per neuro-interventional radiology   - aspiration, fall precautions    Discussed with stroke fellow Dr Charlie Nickerson and under supervision of attending Dr Paola Pereyra regarding decision against candidacy for tenecteplase and decision for thrombectomy and above recommendations/plan. Delay in note entry/ note updates is due to patient care. Discussed patient care and above plan/recommendations with patient, daughter, spouse bedside at time of code stroke. Opportunity for questions offered. Plan will be finalized/amended once signed by attending.

## 2023-07-10 NOTE — CHART NOTE - NSCHARTNOTEFT_GEN_A_CORE
Interventional Neuro- Radiology   Procedure Note PA-C    Procedure: Selective Cerebral Angiography   Pre- Procedure Diagnosis:  Post- Procedure Diagnosis:    : Dr Irving Mason  Fellow:     Dr Cleopatra Agustin   Physician Assistant: Graciela Jansen PA-C    Nurse:                   Jacqueline Benito RN  Radiologic Tech:   Savage Palma LRT   Anesthesiologist:  Dr Pablito Mejia   Sheath:      I/Os: EBL less than 10cc  IV fluids:     cc  Urine output     cc    Contrast Omnipaque 240      cc             Vitals: /72              Spo2 100%          Preliminary Report:  Using a 8 Montenegrin long sheath to the right groin under MAC sedation via left vertebral artery,  left internal carotid artery, left external carotid artery, right vertebral artery, right internal carotid artery, right external carotid artery a selective cerebral angiography was performed and demonstrated                       Official note to follow.  Patient tolerated procedure well, hemodynamically stable, no change in neurological status compared to baseline.  Results discussed with neuro ICU team, patient and patient's family. Right groin sheath was removed, manual compression held to hemostasis for 20 minutes, no active bleeding, no hematoma, quick clot and safeguard balloon dressing applied at Interventional Neuro- Radiology   Procedure Note PA-C    Procedure: Selective Cerebral Angiography   Pre- Procedure Diagnosis:  Post- Procedure Diagnosis:    : Dr Irving Mason  Fellow:     Dr Cleopatra Agustin   Physician Assistant: Graciela Jansen PA-C    Nurse:                   Jacqueline Benito RN  Radiologic Tech:   Savage Palma LRT   Anesthesiologist:  Dr Pablito Mejia   Sheath:                 8 Salvadorean short sheath       I/Os: EBL less than 10cc  IV fluids:     cc  Urine output     cc    Contrast Omnipaque 240      cc             Vitals: /72              Spo2 100%          Preliminary Report:  Using a 8 Salvadorean short sheath to the right groin under MAC sedation via left vertebral artery,  left internal carotid artery, left external carotid artery, right vertebral artery, right internal carotid artery, right external carotid artery a selective cerebral angiography was performed and demonstrated                       Official note to follow.  Patient tolerated procedure well, hemodynamically stable, no change in neurological status compared to baseline.  Results discussed with neuro ICU team, patient and patient's family. Right groin sheath was removed, manual compression held to hemostasis for 20 minutes, no active bleeding, no hematoma, quick clot and safeguard balloon dressing applied at Interventional Neuro- Radiology   Procedure Note PA-C    Procedure: Catheter Cerebral Angiogram and mechanical thrombectomy   Pre- Procedure Diagnosis:   Right MCA infarct   Post- Procedure Diagnosis: Right M1 occlusion, TICI 2A resultant     : Dr Irving Mason  Fellow:     Dr Cleopatra Agustin   Physician Assistant: Graciela Jansen PA-C    Nurse:                   Jacqueline Benito RN  Radiologic Tech:   Savage Palma LRT   Anesthesiologist:  Dr Pablito Mejia   Sheath:                 8 Guyanese short sheath, Carolynn hernandez select catheter       I/Os: EBL less than 10cc  IV fluids: 750cc  Urine due to void   Contrast Omnipaque 240  86cc        ACT 97cc  Heparin 3,ooo units             Vitals: /72              Spo2 100%          Preliminary Report:  Using a 8 Guyanese short sheath to the right groin under general anesthesia via right internal carotid artery, right external carotid artery a selective cerebral angiography was performed and demonstrated                       Official note to follow.  Patient tolerated procedure well, hemodynamically stable, no change in neurological status compared to baseline. Results discussed with neuro ICU team, patient and patient's family. Right groin sheath was removed, manual compression held to hemostasis for 20 minutes, no active bleeding, no hematoma, quick clot and safeguard balloon dressing applied at 4:15pm. Disposition Neuro ICU. NIHSS Interventional Neuro- Radiology   Procedure Note PA-C    Procedure: Catheter Cerebral Angiogram and mechanical thrombectomy   Pre- Procedure Diagnosis:   Right MCA infarct   Post- Procedure Diagnosis: Right M1 occlusion, TICI 2A resultant     : Dr Irving Mason  Fellow:     Dr Cleopatra Agustin   Physician Assistant: Graciela Jansen PA-C    Nurse:                   Jacqueline Benito RN  Radiologic Tech:   Savage Palma LRT   Anesthesiologist:  Dr Pablito Mejia   Sheath:                 8 Guatemalan short sheath, Carolynn hernandez select catheter       I/Os: EBL less than 10cc  IV fluids: 750cc  Urine due to void   Contrast Omnipaque 240  86cc        ACT 97cc  Heparin 3,ooo units             Vitals: /72              Spo2 100%          Preliminary Report:  Using a 8 Guatemalan short sheath to the right groin under general anesthesia via right internal carotid artery, right external carotid artery a selective cerebral angiography was performed and demonstrated right M! occlusion, TICI 2 A resultant. Official note to follow.  Patient tolerated procedure well, hemodynamically stable, no change in neurological status compared to baseline. Results discussed with neuro ICU team, patient and patient's family. Right groin sheath was removed, manual compression held to hemostasis for 20 minutes, no active bleeding, no hematoma, quick clot and safeguard balloon dressing applied at 4:15pm. Disposition Neuro ICU. NIHSS 9.

## 2023-07-10 NOTE — ED ADULT TRIAGE NOTE - CADM TRG TX PRIOR TO ARRIVAL
After Visit Summary   9/7/2017    Ajit Sorto    MRN: [id number]           Patient Information     Date Of Birth          1972        Visit Information        Provider Department      9/7/2017 4:20 PM Ashlee Olivares APRN CNP [facility]        Today's Diagnoses     Rupture of right biceps tendon, initial encounter    -  1      Care Instructions    1.  I think you may have ruptured your distal biceps tendon.  recommend ice, ibuprofen, and compression as needed for pain.  Follow up with surgeon to discuss options/possibe repair.            Follow-ups after your visit        Additional Services     ORTHO  REFERRAL       [facility] is referring you to the Orthopedic  Services at [facility].       The  Representative will assist you in the coordination of your Orthopedic and Musculoskeletal Care as prescribed by your physician.    The  Representative will call you within 1 business day to help schedule your appointment, or you may contact the  Representative at:    All areas ~ [phone]     Type of Referral : Surgical / Specialist       Timeframe requested: 3 - 5 days    Coverage of these services is subject to the terms and limitations of your health insurance plan.  Please call member services at your health plan with any benefit or coverage questions.      If X-rays, CT or MRI's have been performed, please contact the facility where they were done to arrange for , prior to your scheduled appointment.  Please bring this referral request to your appointment and present it to your specialist.                  Your next 10 appointments already scheduled     Oct 02, 2017  7:40 AM CDT   PHYSICAL with Joel Daniel Irwin Wegener, MD   [facility]    [address] [place] [id number] [phone]              Who to contact      "If you have questions or need follow up information about today's clinic visit or your schedule please contact [facility] directly at [phone].  Normal or non-critical lab and imaging results will be communicated to you by MyChart, letter or phone within 4 business days after the clinic has received the results. If you do not hear from us within 7 days, please contact the clinic through [employer] or phone. If you have a critical or abnormal lab result, we will notify you by phone as soon as possible.  Submit refill requests through [employer] or call your pharmacy and they will forward the refill request to us. Please allow 3 business days for your refill to be completed.          Additional Information About Your Visit        [employer] Information     [employer] lets you send messages to your doctor, view your test results, renew your prescriptions, schedule appointments and more. To sign up, go to [email or web address] . Click on \"Log in\" on the left side of the screen, which will take you to the Welcome page. Then click on \"Sign up Now\" on the right side of the page.     You will be asked to enter the access code listed below, as well as some personal information. Please follow the directions to create your username and password.     Your access code is: [email or web address]  Expires: 2017  5:01 PM     Your access code will  in 90 days. If you need help or a new code, please call your [facility] clinic or [phone].        Care EveryWhere ID     This is your Care EveryWhere ID. This could be used by other organizations to access your [facility] medical records  [id number]        Your Vitals Were     Pulse Temperature Respirations Height Pulse Oximetry BMI (Body Mass Index)    71 97.6  F (36.4  C) (Tympanic) 16 5' 7.5\" (1.715 m) 98% 33.64 kg/m2       Blood Pressure from Last 3 Encounters:   17 116/74    Weight from Last 3 Encounters:   17 218 lb (98.9 kg)              We Performed the " Following     ORTHO  REFERRAL        Primary Care Provider    None Specified       No primary provider on file.        Equal Access to Services     ARELY XAVIER : Hadii robert Darby, radhada reymundo, alandelmis shannonmamaegan smith, ayan whitehead . So [facility] [phone].    ATENCIÓN: Si habla español, tiene a becerra disposición servicios gratuitos de asistencia lingüística. Llame al [phone].    We comply with applicable federal civil rights laws and [place] laws. We do not discriminate on the basis of race, color, national origin, age, disability sex, sexual orientation or gender identity.            Thank you!     Thank you for choosing [facility]  for your care. Our goal is always to provide you with excellent care. Hearing back from our patients is one way we can continue to improve our services. Please take a few minutes to complete the written survey that you may receive in the mail after your visit with us. Thank you!             Your Updated Medication List - Protect others around you: Learn how to safely use, store and throw away your medicines at www.disposemymeds.org.          This list is accurate as of: 9/7/17  5:01 PM.  Always use your most recent med list.                   Brand Name Dispense Instructions for use Diagnosis    PROBIOTIC ADVANCED PO              none

## 2023-07-10 NOTE — PROGRESS NOTE ADULT - ASSESSMENT
Summary:  62 yo male s/p R M1/M2 thrombectomy for R MCA stroke on 7/10    NEURO:  q1h neuro checks; CT brain in AM; stroke neurology following    CARDS:  -160    PULM:  sat > 92%    RENAL:  IVL when tolerating PO    GASTRO:  advance as tolerated  ---> Stress ulcer prophylaxis:  PPI    HEME:  monitor H/H    ---> DVT prophylaxis: SCDs, hold anticoagulation, fresh post-angio    ENDO:  euglycemia    ID:  afebrile    Code status:  Full code  Disposition:  ICU    This patient was at high risk of neurologic deterioration and/or death due to: stroke    ATTENDING STATEMENT:  Patient is critically ill, requiring critical care services.     Attending: I have personally and independently provided 45 minutes of critical care services.  This excludes any time spent on separate procedures or teaching.

## 2023-07-10 NOTE — H&P ADULT - ATTENDING COMMENTS
I reviewed available diagnostic studies, and reviewed images personally. I agree with resident's history, exam, orders placed, and plan of care. Medical issues needing to be addressed include: cerebral infarction w/ cerebral edema in R MCA territory, hx of amyloidosis, dysarthria, L hemiparesis, dysarthria. CTA showed R distal bifurcation M1 occlusion, CTh showed evolving inferior territory ischemia, w/ CTP showing involvement of much larger MCA territory w/ large penumbrae. Pt taken to thrombectomy with TICI 2a reperfusion.  Exam today - gaze now able to cross midline, LUE 4/5, LLE 4-/5, mod dysarthria. Otherwise oriented and alert. MRI pending, ECHO pending. ESUS. Service provided 7/11/2023.

## 2023-07-10 NOTE — CHART NOTE - NSCHARTNOTEFT_GEN_A_CORE
Interventional Neuro Radiology  Pre-Procedure Note OC      This is a 63 year old right hand dominant handPMHx recent amyloidosis diagnosis presents to ED for dysarthria, L hemiparesis. Patient accompanied by daughter and wife who all state LKW 9PM 7/9/23. Patient during yesterdayfelt generally tired as he does from amyloidosis diagnosis but was able to ambulate to [facility], go to the beach. Then after 9PM last night, he states he might have felt a little weak in his "R leg" (but suspect L leg) and slipped in the bathroom and was down for <1 hour and found by spouse. Then today AM when daughter returned from [place], she noted he was slurring with weakness for which he came to ED. Not on ac/ ap agents. Here, NIHSS 9. preMRS 0.  (10 Jul 2023 13:23)      Allergies: No Known Allergies      PAST MEDICAL & SURGICAL HISTORY:      Social History:     FAMILY HISTORY:      Current Medications:     Labs:                         14.7   9.41  )-----------( 242      ( 10 Jul 2023 12:53 )             45.9       07-10    135  |  105  |  17  ----------------------------<  101<H>  6.6<HH>   |  19<L>  |  0.99    Ca    9.6      10 Jul 2023 12:53    TPro  7.4  /  Alb  3.7  /  TBili  1.2  /  DBili  x   /  AST  52<H>  /  ALT  41  /  AlkPhos  125<H>  07-10          Blood Bank:       Assessment/Plan:   This is a 63 year old right hand dominant male right MCA infarct, Procedure, goals, risks, benefits and alternatives were discussed with patient and patient's family.  All questions were answered.  Risks include but are not limited to stroke, vessel injury, hemorrhage, and or right  groin hematoma.  Patient demonstrates understanding  of all risks involved with this procedure and wishes to continue.  Appropriate  consent was obtained from patient and consent is in the patient's chart. NIHSS 9.

## 2023-07-10 NOTE — H&P ADULT - HISTORY OF PRESENT ILLNESS
Patient John Lozano is a 62yo R handed M PMHx recent amyloidosis diagnosis presents to ED for dysarthria, L hemiparesis. Patient accompanied by daughter and wife who all state LKW 9PM 7/9/23. Patient during yesterdayfelt generally tired as he does from amyloidosis diagnosis but was able to ambulate to [facility], go to the beach. Then after 9PM last night, he states he might have felt a little weak in his "R leg" (but suspect L leg) and slipped in the bathroom and was down for <1 hour and found by spouse. Then today AM when daughter returned from [place], she noted he was slurring with weakness for which he came to ED. Not on ac/ ap agents. Here, NIHSS 9. preMRS 0.  Patient John Lozano is a 64yo R handed M PMHx recent amyloidosis diagnosis presents to ED for dysarthria, L hemiparesis. Patient accompanied by daughter and wife who all state LKW 9PM 7/9/23. Patient during yesterday felt generally tired as he does from amyloidosis diagnosis but was able to ambulate to [facility], go to the beach. Then after 9PM last night, he states he might have felt a little weak in his "R leg" (but suspect L leg given exam) and slipped in the bathroom and was down for <1 hour and found by spouse. Then today AM when daughter returned from [place], she noted he was slurring with weakness for which he came to ED. Not on ac/ ap agents. Here, NIHSS 9. preMRS 0.

## 2023-07-10 NOTE — ED PROVIDER NOTE - CLINICAL SUMMARY MEDICAL DECISION MAKING FREE TEXT BOX
63-year-old male patient who presents to the ED for disorientation and L sided weakness after fall last night. Code stroke called. Found w deficits and weakness over L sided ext, dysarthria. Will order CTs, blood work , and admit 63-year-old male patient who presents to the ED for disorientation, unsteady gait, and L sided weakness after fall last night. Code stroke called. Found w deficits and weakness over L sided ext, dysarthria. Will order CTs, blood work , and admit    Dr. Scott (Attending Physician)

## 2023-07-10 NOTE — CHART NOTE - NSCHARTNOTEFT_GEN_A_CORE
CAPRINI SCORE [CLOT] Score on Admission for     AGE RELATED RISK FACTORS                                                       MOBILITY RELATED FACTORS  [ ] Age 41-60 years                                            (1 Point)                  [ ] Bed rest                                                        (1 Point)  [x ] Age: 61-74 years                                           (2 Points)                 [ ] Plaster cast                                                   (2 Points)  [ ] Age= 75 years                                              (3 Points)                 [ ] Bed bound for more than 72 hours                 (2 Points)    DISEASE RELATED RISK FACTORS                                               GENDER SPECIFIC FACTORS  [ ] Edema in the lower extremities                       (1 Point)                  [ ] Pregnancy                                                     (1 Point)  [ ] Varicose veins                                               (1 Point)                  [ ] Post-partum < 6 weeks                                   (1 Point)             [x ] BMI > 25 Kg/m2                                            (1 Point)                  [ ] Hormonal therapy  or oral contraception          (1 Point)                 [ ] Sepsis (in the previous month)                        (1 Point)                  [ ] History of pregnancy complications                 (1 point)  [ ] Pneumonia or serious lung disease                                               [ ] Unexplained or recurrent                     (1 Point)           (in the previous month)                               (1 Point)  [ ] Abnormal pulmonary function test                     (1 Point)                 SURGERY RELATED RISK FACTORS (include planned surgeries)  [ ] Acute myocardial infarction                              (1 Point)                 [ ]  Section                                             (1 Point)  [ ] Congestive heart failure (in the previous month)  (1 Point)               [ ] Minor surgery                                                  (1 Point)   [ ] Inflammatory bowel disease                             (1 Point)                 [ ] Arthroscopic surgery                                        (2 Points)  [ ] Central venous access                                      (2 Points)                [ ] General surgery lasting more than 45 minutes   (2 Points)       [x ] Stroke (in the previous month)                          (5 Points)               [ ] Elective arthroplasty                                         (5 Points)            [ ] Current or past malignancy                                (2 Points)                                                                                                     HEMATOLOGY RELATED FACTORS                                                 TRAUMA RELATED RISK FACTORS  [ ] Prior episodes of VTE                                     (3 Points)                [ ] Fracture of the hip, pelvis, or leg                       (5 Points)  [ ] Positive family history for VTE                         (3 Points)                 [ ] Acute spinal cord injury (in the previous month)  (5 Points)  [ ] Prothrombin 94652 A                                     (3 Points)                 [ ] Paralysis  (less than 1 month)                             (5 Points)  [ ] Factor V Leiden                                             (3 Points)                  [ ] Multiple Trauma within 1 month                        (5 Points)  [ ] Lupus anticoagulants                                     (3 Points)                                                           [ ] Anticardiolipin antibodies                               (3 Points)                                                       [ ] High homocysteine in the blood                      (3 Points)                                             [ ] Other congenital or acquired thrombophilia      (3 Points)                                                [ ] Heparin induced thrombocytopenia                  (3 Points)                                          Total Score [    8      ]    Risk:  Very low 0   Low 1 to 2   Moderate 3 to 4   High =5       VTE Prophylasix Recommednations:  [x ] mechanical pneumatic compression devices                                      [ ] contraindicated: _____________________  [ ] chemo prophylasix                                                                                   [ ] contraindicated _____________________    **** HIGH LIKELIHOOD DVT PRESENT ON ADMISSION  [x ] (please order LE dopplers within 24 hours of admission)

## 2023-07-10 NOTE — PROGRESS NOTE ADULT - SUBJECTIVE AND OBJECTIVE BOX
HPI:  Patient John Lozano is a 62yo R handed M PMHx recent amyloidosis diagnosis presents to ED for dysarthria, L hemiparesis. Patient accompanied by daughter and wife who all state LKW 9PM 7/9/23. Patient during yesterday felt generally tired as he does from amyloidosis diagnosis but was able to ambulate to [place], go to the beach. Then after 9PM last night, he states he might have felt a little weak in his "R leg" (but suspect L leg given exam) and slipped in the bathroom and was down for <1 hour and found by spouse. Then today AM when daughter returned from [place], she noted he was slurring with weakness for which he came to ED. Not on ac/ ap agents. Here, NIHSS 9. preMRS 0.  (10 Jul 2023 13:23)    7/10 R m1 thrombectomy TICI 2A    On admission, the patient was:  NIHSS: 9    *** HIGH RISK OF DVT PRESENT ON ADMISSION ***    VITALS:  T(C): , Max: 37.1 (07-10-23 @ 19:00)  HR:  (77 - 87)  BP:  (103/86 - 121/75)  ABP: --  RR:  (13 - 26)  SpO2:  (93% - 100%)  Wt(kg): --      07-10-23 @ 07:01  -  07-10-23 @ 22:21  --------------------------------------------------------  IN: 640 mL / OUT: 300 mL / NET: 340 mL      LABS:  Na: 138 (07-10 @ 20:37), 137 (07-10 @ 14:08), 135 (07-10 @ 12:53)  K: 4.3 (07-10 @ 20:37), 4.0 (07-10 @ 14:08), 6.6 (07-10 @ 12:53)  Cl: 108 (07-10 @ 20:37), 106 (07-10 @ 14:08), 105 (07-10 @ 12:53)  CO2: 18 (07-10 @ 20:37), 21 (07-10 @ 14:08), 19 (07-10 @ 12:53)  BUN: 15 (07-10 @ 20:37), 17 (07-10 @ 14:08), 17 (07-10 @ 12:53)  Cr: 0.91 (07-10 @ 20:37), 0.93 (07-10 @ 14:08), 0.99 (07-10 @ 12:53)  Glu: 121(07-10 @ 20:37), 91(07-10 @ 14:08), 101(07-10 @ 12:53)    Hgb: 13.9 (07-10 @ 20:37), 14.7 (07-10 @ 12:53)  Hct: 43.7 (07-10 @ 20:37), 45.9 (07-10 @ 12:53)  WBC: 10.57 (07-10 @ 20:37), 9.41 (07-10 @ 12:53)  Plt: 217 (07-10 @ 20:37), 242 (07-10 @ 12:53)  PT: 12.3 (07-10 @ 20:37)  INR: 1.07 (07-10 @ 20:37)  aPTT: 25.6 (07-10 @ 20:37), PT: 12.4 (07-10 @ 12:53)  INR: 1.08 (07-10 @ 12:53)  aPTT: 24.8 (07-10 @ 12:53)    IMAGING:   Recent imaging studies were reviewed.    MEDICATIONS:  acetaminophen     Tablet .. 650 milliGRAM(s) Oral every 6 hours PRN  atorvastatin 40 milliGRAM(s) Oral at bedtime  chlorhexidine 4% Liquid 1 Application(s) Topical daily  ondansetron Injectable 4 milliGRAM(s) IV Push once PRN  polyethylene glycol 3350 17 Gram(s) Oral daily  senna 2 Tablet(s) Oral at bedtime  sodium chloride 0.9%. 1000 milliLiter(s) IV Continuous <Continuous>    EXAMINATION:  General:  calm  HEENT:  MMM  Neuro:  awake, alert, oriented x 3, follows commands, moves all extremities, dysarthria, 5/5 RUE, 4/5 LUE, mild L sided neglect  Cards:  RRR  Respiratory:  no respiratory distress  Adomen:  soft  Extremities:  no edema  Skin:  warm/dry

## 2023-07-10 NOTE — ED ADULT NURSE NOTE - NSFALLRISKINTERV_ED_ALL_ED

## 2023-07-10 NOTE — H&P ADULT - NSHPPHYSICALEXAM_GEN_ALL_CORE
Vital Signs Last 24 Hrs  T(C): 36.8 (10 Jul 2023 12:35), Max: 36.8 (10 Jul 2023 12:35)  T(F): 98.2 (10 Jul 2023 12:35), Max: 98.2 (10 Jul 2023 12:35)  HR: 84 (10 Jul 2023 13:56) (82 - 84)  BP: 121/75 (10 Jul 2023 13:56) (109/75 - 121/75)  BP(mean): 89 (10 Jul 2023 13:56) (89 - 89)  RR: 18 (10 Jul 2023 13:56) (18 - 18)  SpO2: 100% (10 Jul 2023 13:56) (95% - 100%)    Parameters below as of 10 Jul 2023 13:56  Patient On (Oxygen Delivery Method): room air    General:  Constitutional: Male, appears stated age, nontoxic, not in distress,    Head: Normocephalic;   Eyes: clear sclera;   Extremities: No cyanosis;   Resp: breathing comfortably     Neurological (>12):  MS: Awake, alert. Oriented person place. Denies he is having a stroke. Follows commands. Attends to examiner  Language: Speech is hypophonic, mildly dysarthric. Fluent, good repetition,  comprehension, registration of words.  CNs: PERRL. LHH to blink to threat. R gaze preference but is able to cross midline and look to L side when asked. No disconjugate gaze. V1-3 intact LT, L facial droop.      Motor - Normal bulk and tone.    L/R (out of 5)       Deltoid  4+/5    Biceps   4/5      Triceps  4+/5           4/5   L/R (out of 5)       Hip Flexion  4/5   Knee Extension  4+/5  Dorsiflexion  4+/5      Plantar Flexion 4+/5     Sensation: On repeat eval, not able to appreciate sensation noxious stimuli LUE but is able to of LLE. Does not attend to L side.   Coordination: No dysmetria to FTN b/l UE, difficult to have follow for HTS   Gait: deferred due to recent fall and fall risk

## 2023-07-10 NOTE — ED PROVIDER NOTE - OBJECTIVE STATEMENT
63-year-old male patient past medical history of amyoloidosis who presents to the emergency department for altered mental status after an unwitnessed fall yesterday.  Patient last well-known around 10 PM prior to falling in the bath.  Positive head strike, no blood thinners, unknown LOC.  Since the patient has been having left-sided weakness and disorientation.

## 2023-07-10 NOTE — ED ADULT NURSE NOTE - OBJECTIVE STATEMENT
63 y.o M [facility] p/w c/o AMS. A+Ox3. Pt wife states last night pt went to shower, slipped and fell and hit head on faucet. LKN 2100. Reports unsteady gait, dysarthria, and L sided weakness. Pt wife reports pt "not acting himself" this morning so brought him to [facility]. PMH amyloidosis, was supposed to start treatment tomorrow. Denies CP, SOB, HA. Reports dizziness. Unsteady gait noted. Wife and daughter at bedside.

## 2023-07-10 NOTE — ED ADULT NURSE REASSESSMENT NOTE - NS ED NURSE REASSESS COMMENT FT1
MD Fuentes at bedside. Family explained risks and benefits of IR procedure, to be moved to IR. Family verbalized understanding.

## 2023-07-11 ENCOUNTER — APPOINTMENT (OUTPATIENT)
Dept: CARDIOLOGY | Facility: CLINIC | Age: 63
End: 2023-07-11

## 2023-07-11 LAB
ANION GAP SERPL CALC-SCNC: 11 MMOL/L — SIGNIFICANT CHANGE UP (ref 5–17)
BUN SERPL-MCNC: 11 MG/DL — SIGNIFICANT CHANGE UP (ref 7–23)
CALCIUM SERPL-MCNC: 9 MG/DL — SIGNIFICANT CHANGE UP (ref 8.4–10.5)
CHLORIDE SERPL-SCNC: 105 MMOL/L — SIGNIFICANT CHANGE UP (ref 96–108)
CHOLEST SERPL-MCNC: 377 MG/DL — HIGH
CO2 SERPL-SCNC: 21 MMOL/L — LOW (ref 22–31)
CREAT SERPL-MCNC: 0.98 MG/DL — SIGNIFICANT CHANGE UP (ref 0.5–1.3)
EGFR: 87 ML/MIN/1.73M2 — SIGNIFICANT CHANGE UP
GLUCOSE SERPL-MCNC: 112 MG/DL — HIGH (ref 70–99)
HCT VFR BLD CALC: 41 % — SIGNIFICANT CHANGE UP (ref 39–50)
HCV AB S/CO SERPL IA: 0.06 S/CO — SIGNIFICANT CHANGE UP (ref 0–0.99)
HCV AB SERPL-IMP: SIGNIFICANT CHANGE UP
HDLC SERPL-MCNC: 75 MG/DL — SIGNIFICANT CHANGE UP
HGB BLD-MCNC: 13.3 G/DL — SIGNIFICANT CHANGE UP (ref 13–17)
LIPID PNL WITH DIRECT LDL SERPL: 227 MG/DL — HIGH
MAGNESIUM SERPL-MCNC: 1.8 MG/DL — SIGNIFICANT CHANGE UP (ref 1.6–2.6)
MCHC RBC-ENTMCNC: 29.3 PG — SIGNIFICANT CHANGE UP (ref 27–34)
MCHC RBC-ENTMCNC: 32.4 GM/DL — SIGNIFICANT CHANGE UP (ref 32–36)
MCV RBC AUTO: 90.3 FL — SIGNIFICANT CHANGE UP (ref 80–100)
NON HDL CHOLESTEROL: 302 MG/DL — HIGH
NRBC # BLD: 0 /100 WBCS — SIGNIFICANT CHANGE UP (ref 0–0)
PHOSPHATE SERPL-MCNC: 3.1 MG/DL — SIGNIFICANT CHANGE UP (ref 2.5–4.5)
PLATELET # BLD AUTO: 209 K/UL — SIGNIFICANT CHANGE UP (ref 150–400)
POTASSIUM SERPL-MCNC: 3.9 MMOL/L — SIGNIFICANT CHANGE UP (ref 3.5–5.3)
POTASSIUM SERPL-SCNC: 3.9 MMOL/L — SIGNIFICANT CHANGE UP (ref 3.5–5.3)
RBC # BLD: 4.54 M/UL — SIGNIFICANT CHANGE UP (ref 4.2–5.8)
RBC # FLD: 13 % — SIGNIFICANT CHANGE UP (ref 10.3–14.5)
SODIUM SERPL-SCNC: 137 MMOL/L — SIGNIFICANT CHANGE UP (ref 135–145)
T3 SERPL-MCNC: 108 NG/DL — SIGNIFICANT CHANGE UP (ref 80–200)
T4 AB SER-ACNC: 6.2 UG/DL — SIGNIFICANT CHANGE UP (ref 4.6–12)
T4 FREE SERPL-MCNC: 1.1 NG/DL — SIGNIFICANT CHANGE UP (ref 0.9–1.8)
TRIGL SERPL-MCNC: 357 MG/DL — HIGH
TSH SERPL-MCNC: 1 UIU/ML — SIGNIFICANT CHANGE UP (ref 0.27–4.2)
WBC # BLD: 12.38 K/UL — HIGH (ref 3.8–10.5)
WBC # FLD AUTO: 12.38 K/UL — HIGH (ref 3.8–10.5)

## 2023-07-11 PROCEDURE — 99223 1ST HOSP IP/OBS HIGH 75: CPT

## 2023-07-11 PROCEDURE — 93970 EXTREMITY STUDY: CPT | Mod: 26

## 2023-07-11 PROCEDURE — 70496 CT ANGIOGRAPHY HEAD: CPT | Mod: 26

## 2023-07-11 PROCEDURE — 70498 CT ANGIOGRAPHY NECK: CPT | Mod: 26

## 2023-07-11 PROCEDURE — 93306 TTE W/DOPPLER COMPLETE: CPT | Mod: 26

## 2023-07-11 PROCEDURE — 70450 CT HEAD/BRAIN W/O DYE: CPT | Mod: 26,59

## 2023-07-11 PROCEDURE — 70450 CT HEAD/BRAIN W/O DYE: CPT | Mod: 26,77,59

## 2023-07-11 PROCEDURE — 99291 CRITICAL CARE FIRST HOUR: CPT

## 2023-07-11 RX ORDER — SODIUM CHLORIDE 9 MG/ML
500 INJECTION INTRAMUSCULAR; INTRAVENOUS; SUBCUTANEOUS ONCE
Refills: 0 | Status: COMPLETED | OUTPATIENT
Start: 2023-07-11 | End: 2023-07-11

## 2023-07-11 RX ORDER — ENOXAPARIN SODIUM 100 MG/ML
40 INJECTION SUBCUTANEOUS
Refills: 0 | Status: DISCONTINUED | OUTPATIENT
Start: 2023-07-11 | End: 2023-07-14

## 2023-07-11 RX ORDER — ASPIRIN/CALCIUM CARB/MAGNESIUM 324 MG
325 TABLET ORAL ONCE
Refills: 0 | Status: COMPLETED | OUTPATIENT
Start: 2023-07-11 | End: 2023-07-11

## 2023-07-11 RX ORDER — ASPIRIN/CALCIUM CARB/MAGNESIUM 324 MG
81 TABLET ORAL DAILY
Refills: 0 | Status: DISCONTINUED | OUTPATIENT
Start: 2023-07-12 | End: 2023-07-14

## 2023-07-11 RX ADMIN — OXYCODONE HYDROCHLORIDE 5 MILLIGRAM(S): 5 TABLET ORAL at 11:32

## 2023-07-11 RX ADMIN — ATORVASTATIN CALCIUM 40 MILLIGRAM(S): 80 TABLET, FILM COATED ORAL at 21:05

## 2023-07-11 RX ADMIN — Medication 325 MILLIGRAM(S): at 11:32

## 2023-07-11 RX ADMIN — Medication 650 MILLIGRAM(S): at 19:07

## 2023-07-11 RX ADMIN — SENNA PLUS 2 TABLET(S): 8.6 TABLET ORAL at 21:04

## 2023-07-11 RX ADMIN — Medication 650 MILLIGRAM(S): at 18:37

## 2023-07-11 RX ADMIN — Medication 1 DROP(S): at 15:07

## 2023-07-11 RX ADMIN — OXYCODONE HYDROCHLORIDE 5 MILLIGRAM(S): 5 TABLET ORAL at 12:02

## 2023-07-11 RX ADMIN — CHLORHEXIDINE GLUCONATE 1 APPLICATION(S): 213 SOLUTION TOPICAL at 11:32

## 2023-07-11 RX ADMIN — SODIUM CHLORIDE 1000 MILLILITER(S): 9 INJECTION INTRAMUSCULAR; INTRAVENOUS; SUBCUTANEOUS at 03:30

## 2023-07-11 RX ADMIN — SODIUM CHLORIDE 1000 MILLILITER(S): 9 INJECTION INTRAMUSCULAR; INTRAVENOUS; SUBCUTANEOUS at 03:05

## 2023-07-11 RX ADMIN — Medication 1 DROP(S): at 21:40

## 2023-07-11 RX ADMIN — ENOXAPARIN SODIUM 40 MILLIGRAM(S): 100 INJECTION SUBCUTANEOUS at 17:35

## 2023-07-11 RX ADMIN — Medication 102 GRAM(S): at 02:38

## 2023-07-11 RX ADMIN — POLYETHYLENE GLYCOL 3350 17 GRAM(S): 17 POWDER, FOR SOLUTION ORAL at 11:32

## 2023-07-11 NOTE — PHYSICAL THERAPY INITIAL EVALUATION ADULT - ORIENTATION, REHAB EVAL
Pt able to state correct month h/o unable to state correct year. Pt able to state he fell but unable to state he had  a stroke./person/place

## 2023-07-11 NOTE — OCCUPATIONAL THERAPY INITIAL EVALUATION ADULT - SHORT TERM MEMORY, REHAB EVAL
poor insight into L weakness despite reorientation/impaired poor insight into L weakness despite reorientation. Short Blessed Test (cog screen) not completed 2/2 mild word finding difficulty.  Pt did not recall month 1 min after being reoriented./impaired

## 2023-07-11 NOTE — PROGRESS NOTE ADULT - SUBJECTIVE AND OBJECTIVE BOX
HPI:  Patient John Lozano is a 64yo R handed M PMHx recent amyloidosis diagnosis presents to ED for dysarthria, L hemiparesis. Patient accompanied by daughter and wife who all state LKW 9PM 7/9/23. Patient during yesterday felt generally tired as he does from amyloidosis diagnosis but was able to ambulate to [employer], go to the beach. Then after 9PM last night, he states he might have felt a little weak in his "R leg" (but suspect L leg given exam) and slipped in the bathroom and was down for <1 hour and found by spouse. Then today AM when daughter returned from [place], she noted he was slurring with weakness for which he came to ED. Not on ac/ ap agents. Here, NIHSS 9. preMRS 0.  (10 Jul 2023 13:23)    7/10 R m1 thrombectomy TICI 2A  7/11: Episode of hypotension overnight, resolved after NS bolus    On admission, the patient was:  NIHSS: 9    *** HIGH RISK OF DVT PRESENT ON ADMISSION ***    VITALS:  T(C): , Max: 37.1 (07-10-23 @ 19:00)  HR:  (77 - 87)  BP:  (103/86 - 121/75)  ABP: --  RR:  (13 - 26)  SpO2:  (93% - 100%)  Wt(kg): --      07-10-23 @ 07:01  -  07-10-23 @ 22:21  --------------------------------------------------------  IN: 640 mL / OUT: 300 mL / NET: 340 mL      LABS:  Na: 138 (07-10 @ 20:37), 137 (07-10 @ 14:08), 135 (07-10 @ 12:53)  K: 4.3 (07-10 @ 20:37), 4.0 (07-10 @ 14:08), 6.6 (07-10 @ 12:53)  Cl: 108 (07-10 @ 20:37), 106 (07-10 @ 14:08), 105 (07-10 @ 12:53)  CO2: 18 (07-10 @ 20:37), 21 (07-10 @ 14:08), 19 (07-10 @ 12:53)  BUN: 15 (07-10 @ 20:37), 17 (07-10 @ 14:08), 17 (07-10 @ 12:53)  Cr: 0.91 (07-10 @ 20:37), 0.93 (07-10 @ 14:08), 0.99 (07-10 @ 12:53)  Glu: 121(07-10 @ 20:37), 91(07-10 @ 14:08), 101(07-10 @ 12:53)    Hgb: 13.9 (07-10 @ 20:37), 14.7 (07-10 @ 12:53)  Hct: 43.7 (07-10 @ 20:37), 45.9 (07-10 @ 12:53)  WBC: 10.57 (07-10 @ 20:37), 9.41 (07-10 @ 12:53)  Plt: 217 (07-10 @ 20:37), 242 (07-10 @ 12:53)  PT: 12.3 (07-10 @ 20:37)  INR: 1.07 (07-10 @ 20:37)  aPTT: 25.6 (07-10 @ 20:37), PT: 12.4 (07-10 @ 12:53)  INR: 1.08 (07-10 @ 12:53)  aPTT: 24.8 (07-10 @ 12:53)    IMAGING:   Recent imaging studies were reviewed.    MEDICATIONS:  acetaminophen     Tablet .. 650 milliGRAM(s) Oral every 6 hours PRN  atorvastatin 40 milliGRAM(s) Oral at bedtime  chlorhexidine 4% Liquid 1 Application(s) Topical daily  ondansetron Injectable 4 milliGRAM(s) IV Push once PRN  polyethylene glycol 3350 17 Gram(s) Oral daily  senna 2 Tablet(s) Oral at bedtime  sodium chloride 0.9%. 1000 milliLiter(s) IV Continuous <Continuous>    EXAMINATION:  General:  calm  HEENT:  MMM  Neuro:  awake, alert, oriented x 3, follows commands, moves all extremities, dysarthria, 5/5 RUE, 4/5 LUE, mild L sided neglect  Cards:  RRR  Respiratory:  no respiratory distress  Adomen:  soft  Extremities:  no edema  Skin:  warm/dry HPI:  Patient John Lozano is a 64yo R handed M PMHx recent amyloidosis (Ejection Fraction 43) diagnosis presents to ED for dysarthria, L hemiparesis. Patient accompanied by daughter and wife who all state LKW 9PM 7/9/23. Patient during yesterday felt generally tired as he does from amyloidosis diagnosis but was able to ambulate to [employer], go to the beach. Then after 9PM last night, he states he might have felt a little weak in his "R leg" (but suspect L leg given exam) and slipped in the bathroom and was down for <1 hour and found by spouse. Then today AM when daughter returned from [place], she noted he was slurring with weakness for which he came to ED. Not on ac/ ap agents. Here, NIHSS 9. preMRS 0.  (10 Jul 2023 13:23)    7/10 R m1 thrombectomy TICI 2A  7/11: Episode of hypotension overnight, resolved after NS bolus, Stat CT showing new developing infarct in the right middle cerebral artery vascular territory    On admission, the patient was:  NIHSS: 9    *** HIGH RISK OF DVT PRESENT ON ADMISSION ***    VITALS:  T(C): , Max: 37.1 (07-10-23 @ 19:00)  HR:  (77 - 87)  BP:  (103/86 - 121/75)  ABP: --  RR:  (13 - 26)  SpO2:  (93% - 100%)  Wt(kg): --      07-10-23 @ 07:01  -  07-10-23 @ 22:21  --------------------------------------------------------  IN: 640 mL / OUT: 300 mL / NET: 340 mL        LABS:                          13.9   10.57 )-----------( 217      ( 10 Jul 2023 20:37 )             43.7     07-10    138  |  108  |  15  ----------------------------<  121<H>  4.3   |  18<L>  |  0.91    Ca    9.0      10 Jul 2023 20:37  Phos  3.0     07-10  Mg     1.9     07-10    TPro  7.4  /  Alb  3.7  /  TBili  1.2  /  DBili  x   /  AST  52<H>  /  ALT  41  /  AlkPhos  125<H>  07-10    LIVER FUNCTIONS - ( 10 Jul 2023 12:53 )  Alb: 3.7 g/dL / Pro: 7.4 g/dL / ALK PHOS: 125 U/L / ALT: 41 U/L / AST: 52 U/L / GGT: x           PT/INR - ( 10 Jul 2023 20:37 )   PT: 12.3 sec;   INR: 1.07 ratio         PTT - ( 10 Jul 2023 20:37 )  PTT:25.6 sec  Urinalysis Basic - ( 10 Jul 2023 20:37 )    Color: x / Appearance: x / SG: x / pH: x  Gluc: 121 mg/dL / Ketone: x  / Bili: x / Urobili: x   Blood: x / Protein: x / Nitrite: x   Leuk Esterase: x / RBC: x / WBC x   Sq Epi: x / Non Sq Epi: x / Bacteria: x      IMAGING:   Recent imaging studies were reviewed.    7/11  NONCONTRAST HEAD CT SCAN:  1. Redemonstration of large acute infarct in the inferior division of the right MCA vascular territory, involving posterior right temporal lobe, right temporal parietal region, and lateral right occipital lobe.  2. There is new patchy hypoattenuation in the subcortical/deep white matter of the right frontal lobe and new loss of gray matter-white matter differentiation in the right postcentral gyrus, compatible with new developing infarct in the right middle cerebral artery vascular territory.  3. No hemorrhagic conversion.    CT ANGIOGRAPHY NECK:  1. Again seen is a beaded appearance of both internal carotid arteries, suspicious for fibromuscular dysplasia.  2. Partial anomalous pulmonary venous return is noted in the left upper lobe.      CT ANGIOGRAPHY BRAIN: Compared to the prior CTA on 07/10/2023, there is flow related enhancement within the inferior division of the right MCA at site of prior occlusion. Slightly distal to the location of the original occlusion, there are now severe stenoses versus focal occlusions within two proximal M2 branches of the inferior division of the right middle cerebral artery (see key images, 605:58-60, and 5:414-430); and there is now a distal M2 occlusion in the inferior division of the right middle cerebral artery (605:61-62 and 5:447-449).    MEDICATIONS:  acetaminophen     Tablet .. 650 milliGRAM(s) Oral every 6 hours PRN  atorvastatin 40 milliGRAM(s) Oral at bedtime  chlorhexidine 4% Liquid 1 Application(s) Topical daily  ondansetron Injectable 4 milliGRAM(s) IV Push once PRN  polyethylene glycol 3350 17 Gram(s) Oral daily  senna 2 Tablet(s) Oral at bedtime  sodium chloride 0.9%. 1000 milliLiter(s) IV Continuous <Continuous>    EXAMINATION:  General:  calm  HEENT:  MMM, Right Homonymous hemianopsia   Neuro:  awake, alert, oriented x 2-3 to name, place, date (says 2017 but knows month), follows commands,  slightly dysarthria, 5/5 RUE, Left side: 3/5 delt, 4-/5 bicep, 4-/5 tricep, 0/5 in wrist and hand . RLE 5/5, Left side: 4/5 in hip flexion, extension, knee extension and flexion, and dorsiflexion, 5/5 plantar flexion  Cards:  RRR  Respiratory:  no respiratory distress  Adomen:  soft  Extremities:  no edema  Skin:  warm/dry HPI:  Patient John Lozano is a 64yo R handed M PMHx recent amyloidosis (Ejection Fraction 43) diagnosis presents to ED for dysarthria, L hemiparesis. Patient accompanied by daughter and wife who all state LKW 9PM 7/9/23. Patient during yesterday felt generally tired as he does from amyloidosis diagnosis but was able to ambulate to [employer], go to the beach. Then after 9PM last night, he states he might have felt a little weak in his "R leg" (but suspect L leg given exam) and slipped in the bathroom and was down for <1 hour and found by spouse. Then today AM when daughter returned from [place], she noted he was slurring with weakness for which he came to ED. Not on ac/ ap agents. Here, NIHSS 9. preMRS 0.  (10 Jul 2023 13:23)    7/10 R m1 thrombectomy TICI 2A  7/11: Episode of hypotension overnight, resolved after NS bolus, Stat CT showing new developing infarct in the right middle cerebral artery vascular territory    On admission, the patient was:  NIHSS: 9    *** HIGH RISK OF DVT PRESENT ON ADMISSION ***    VITALS:  T(C): , Max: 37.1 (07-10-23 @ 19:00)  HR:  (77 - 87)  BP:  (103/86 - 121/75)  ABP: --  RR:  (13 - 26)  SpO2:  (93% - 100%)  Wt(kg): --      07-10-23 @ 07:01  -  07-10-23 @ 22:21  --------------------------------------------------------  IN: 640 mL / OUT: 300 mL / NET: 340 mL        LABS:                          13.9   10.57 )-----------( 217      ( 10 Jul 2023 20:37 )             43.7     07-10    138  |  108  |  15  ----------------------------<  121<H>  4.3   |  18<L>  |  0.91    Ca    9.0      10 Jul 2023 20:37  Phos  3.0     07-10  Mg     1.9     07-10    TPro  7.4  /  Alb  3.7  /  TBili  1.2  /  DBili  x   /  AST  52<H>  /  ALT  41  /  AlkPhos  125<H>  07-10    LIVER FUNCTIONS - ( 10 Jul 2023 12:53 )  Alb: 3.7 g/dL / Pro: 7.4 g/dL / ALK PHOS: 125 U/L / ALT: 41 U/L / AST: 52 U/L / GGT: x           PT/INR - ( 10 Jul 2023 20:37 )   PT: 12.3 sec;   INR: 1.07 ratio         PTT - ( 10 Jul 2023 20:37 )  PTT:25.6 sec  Urinalysis Basic - ( 10 Jul 2023 20:37 )    Color: x / Appearance: x / SG: x / pH: x  Gluc: 121 mg/dL / Ketone: x  / Bili: x / Urobili: x   Blood: x / Protein: x / Nitrite: x   Leuk Esterase: x / RBC: x / WBC x   Sq Epi: x / Non Sq Epi: x / Bacteria: x      IMAGING:   Recent imaging studies were reviewed.    7/11  NONCONTRAST HEAD CT SCAN:  1. Redemonstration of large acute infarct in the inferior division of the right MCA vascular territory, involving posterior right temporal lobe, right temporal parietal region, and lateral right occipital lobe.  2. There is new patchy hypoattenuation in the subcortical/deep white matter of the right frontal lobe and new loss of gray matter-white matter differentiation in the right postcentral gyrus, compatible with new developing infarct in the right middle cerebral artery vascular territory.  3. No hemorrhagic conversion.    CT ANGIOGRAPHY NECK:  1. Again seen is a beaded appearance of both internal carotid arteries, suspicious for fibromuscular dysplasia.  2. Partial anomalous pulmonary venous return is noted in the left upper lobe.      CT ANGIOGRAPHY BRAIN: Compared to the prior CTA on 07/10/2023, there is flow related enhancement within the inferior division of the right MCA at site of prior occlusion. Slightly distal to the location of the original occlusion, there are now severe stenoses versus focal occlusions within two proximal M2 branches of the inferior division of the right middle cerebral artery (see key images, 605:58-60, and 5:414-430); and there is now a distal M2 occlusion in the inferior division of the right middle cerebral artery (605:61-62 and 5:447-449).    MEDICATIONS:  acetaminophen     Tablet .. 650 milliGRAM(s) Oral every 6 hours PRN  atorvastatin 40 milliGRAM(s) Oral at bedtime  chlorhexidine 4% Liquid 1 Application(s) Topical daily  ondansetron Injectable 4 milliGRAM(s) IV Push once PRN  polyethylene glycol 3350 17 Gram(s) Oral daily  senna 2 Tablet(s) Oral at bedtime  sodium chloride 0.9%. 1000 milliLiter(s) IV Continuous <Continuous>    EXAMINATION:  General:  calm  HEENT:  MMM, Left visual field deficit (likely LHH)  Neuro:  awake, alert, oriented x 2-3 to name, place, date (says 2017 but knows month), follows commands,  slightly dysarthria, 5/5 RUE, Left side: 3/5 delt, 4-/5 bicep, 4-/5 tricep, 0/5 in wrist and hand . RLE 5/5, Left side: 4/5 in hip flexion, extension, knee extension and flexion, and dorsiflexion, 5/5 plantar flexion  Cards:  RRR  Respiratory:  no respiratory distress  Adomen:  soft  Extremities:  no edema  Skin:  warm/dry

## 2023-07-11 NOTE — PHYSICAL THERAPY INITIAL EVALUATION ADULT - PERTINENT HX OF CURRENT PROBLEM, REHAB EVAL
Patient John Lozano is a 64yo R handed M PMHx recent amyloidosis diagnosis presents to ED for dysarthria, L hemiparesis. Patient accompanied by daughter and wife who all state LKW 9PM 7/9/23. Patient during yesterday felt generally tired as he does from amyloidosis diagnosis but was able to ambulate to [employer], go to the beach. Then after 9PM last night, he states he might have felt a little weak in his "R leg" (but suspect L leg given exam) and slipped in the bathroom and was down for <1 hour and found by spouse. Then today AM when daughter returned from [place], she noted he was slurring with weakness for which he came to ED. Not on ac/ ap agents. Here, NIHSS 9. preMRS 0.  Pt s/p 7/10 R m1 thrombectomy TICI 2A. CT Head/Head CTA/Neck CTA 7/11: Redemonstration of large acute infarct in the inferior division of the right MCA vascular territory, involving posterior right temporal lobe, right temporal parietal region, and lateral right occipital lobe. New patchy hypoattenuation in the subcortical/deep white matter of the right frontal lobe and new loss of gray matter-white matter differentiation in the right postcentral gyrus, compatible with new developing infarct in the right middle cerebral artery vascular territory. No hemorrhagic conversion. Both internal carotid arteries, suspicious for fibromuscular dysplasia. Partial anomalous pulmonary venous return is noted in the left upper lobe. Flow related enhancement within the inferior division of the right MCA at site of prior occlusion.  Slightly distal to the location of the original occlusion, there are now severe stenoses versus focal occlusions within two proximal M2 branches of the inferior division of the right middle cerebral artery and there is now a distal M2 occlusion in the inferior division of the right middle cerebral artery. (-) CXR 7/10/23. Brain CT/Neck CTA/Brain CTA/Perfusion CT 7/10/23: Acute right temporal parietal infarct without hemorrhagic conversion. No hemodynamically significant stenosis. Bilateral internal carotid artery fibromuscular dysplasia. Right distal M1 occlusion. Core infarct in the right parietal temporal region of 23 mL with a penumbra of 104 mL.

## 2023-07-11 NOTE — PROVIDER CONTACT NOTE (CHANGE IN STATUS NOTIFICATION) - ASSESSMENT
pt previously oriented x 4. at 3am pt only oriented to person and place. states that the year is 2017 and the president is José Miguel  Pt previously able to left LUE and hold for 10sec with slight drift. pt currently able to demonstrate only some effort against gravity with LUE  Pt is lethargic on exam  Pt with worsening r gaze preference and partial left field cut

## 2023-07-11 NOTE — PHARMACOTHERAPY INTERVENTION NOTE - COMMENTS
Reviewed appropriate route of medication administration  MEDICATIONS  (STANDING):  artificial tears (preservative free) Ophthalmic Solution 1 Drop(s) Both EYES every 6 hours  atorvastatin 40 milliGRAM(s) Oral at bedtime  chlorhexidine 4% Liquid 1 Application(s) Topical daily  enoxaparin Injectable 40 milliGRAM(s) SubCutaneous <User Schedule>  polyethylene glycol 3350 17 Gram(s) Oral daily  senna 2 Tablet(s) Oral at bedtime    MEDICATIONS  (PRN):  acetaminophen     Tablet .. 650 milliGRAM(s) Oral every 6 hours PRN Temp greater or equal to 38C (100.4F), Mild Pain (1 - 3)  ondansetron Injectable 4 milliGRAM(s) IV Push once PRN Nausea and/or Vomiting  oxyCODONE    IR 10 milliGRAM(s) Oral every 4 hours PRN Severe Pain (7 - 10)  oxyCODONE    IR 5 milliGRAM(s) Oral every 4 hours PRN Moderate Pain (4 - 6)  
Spoke with patient at bedside and confirmed home medication and NKDA  Home Medications:  Lasix 20 mg oral tablet: 2 tab(s) orally once a day (10 Jul 2023 14:03)    
No

## 2023-07-11 NOTE — PHYSICAL THERAPY INITIAL EVALUATION ADULT - IMPAIRMENTS CONTRIBUTING IMPAIRED BED MOBILITY, REHAB EVAL
impaired balance/cognition/impaired coordination/impaired motor control/impaired postural control/decreased ROM/decreased sensation/decreased strength

## 2023-07-11 NOTE — PHYSICAL THERAPY INITIAL EVALUATION ADULT - BALANCE TRAINING, PT EVAL
GOAL: Pt will improve  balance during (static/dynamic) (standing) activities by at least 1 balance grade within 4 weeks to assist with greater independence during functional mobility and ADL's.

## 2023-07-11 NOTE — OCCUPATIONAL THERAPY INITIAL EVALUATION ADULT - PERTINENT HX OF CURRENT PROBLEM, REHAB EVAL
62yo R handed M PMHx recent amyloidosis diagnosis presents to ED for dysarthria, L hemiparesis. Patient accompanied by daughter and wife who all state LKW 9PM 7/9/23. Patient during yesterday felt generally tired as he does from amyloidosis diagnosis but was able to ambulate to [facility], go to the beach. Then after 9PM last night, he states he might have felt a little weak in his "R leg" (but suspect L leg given exam) and slipped in the bathroom and was down for <1 hour and found by spouse. Then today AM when daughter returned from [place], she noted he was slurring with weakness for which he came to ED. 7/10 R m1 thrombectomy TICI 2A.

## 2023-07-11 NOTE — PHYSICAL THERAPY INITIAL EVALUATION ADULT - GENERAL OBSERVATIONS, REHAB EVAL
received supine in bed in NAD, +tele, + ICU monitoring, +condom cath, +daughter beside, +pulse ox, + BP cuff, +IV lines, +2L NC, +sequential compression stockings, RN Jacquelin cleared for PT

## 2023-07-11 NOTE — PROGRESS NOTE ADULT - SUBJECTIVE AND OBJECTIVE BOX
NSCU ATTENDING -- ADDITIONAL PROGRESS NOTE    Nighttime rounds were performed -- please refer to earlier Progress Note for HPI details.    T(C): 36.9 (07-11-23 @ 19:00), Max: 37.2 (07-11-23 @ 03:00)  HR: 77 (07-11-23 @ 21:00) (60 - 85)  BP: 114/78 (07-11-23 @ 21:00) (93/57 - 123/78)  RR: 21 (07-11-23 @ 21:00) (10 - 24)  SpO2: 94% (07-11-23 @ 21:00) (92% - 100%)  Wt(kg): --    Relevant labwork and imaging reviewed.    stable.  stroke following.  possible xfer out of NSCU in AM.

## 2023-07-11 NOTE — PHYSICAL THERAPY INITIAL EVALUATION ADULT - IMPAIRMENTS CONTRIBUTING TO GAIT DEVIATIONS, PT EVAL
impaired balance/cognition/impaired coordination/decreased flexibility/impaired motor control/impaired postural control/decreased ROM/decreased sensation/decreased strength

## 2023-07-11 NOTE — OCCUPATIONAL THERAPY INITIAL EVALUATION ADULT - BALANCE DISTURBANCE, IDENTIFIED IMPAIRMENT CONTRIBUTE, REHAB EVAL
impaired coordination/impaired motor control/impaired postural control/decreased ROM/decreased sensation/impaired sensory feedback/decreased strength

## 2023-07-11 NOTE — PHYSICAL THERAPY INITIAL EVALUATION ADULT - GAIT DEVIATIONS NOTED, PT EVAL
decreased thalia/increased time in double stance/decreased step length/decreased stride length/decreased weight-shifting ability

## 2023-07-11 NOTE — PROGRESS NOTE ADULT - ASSESSMENT
Summary:  62 yo male s/p R M1/M2 thrombectomy for R MCA stroke on 7/10    NEURO:  q1h neuro checks; CT brain in AM; stroke neurology following       CT (7/11) awaiting read    CARDS:  -160    PULM:  sat > 92%    RENAL:  IVL when tolerating PO    GASTRO:  advance as tolerated       Stress ulcer prophylaxis:  PPI    HEME:  monitor H/H         DVT prophylaxis: SCDs, hold anticoagulation, fresh post-angio, awaiting CT read (7/11)       VTE    ENDO:  euglycemia (-180)    ID:  afebrile    Code status:  Full code  Disposition:  ICU    This patient was at high risk of neurologic deterioration and/or death due to: stroke    ATTENDING STATEMENT:  Patient is critically ill, requiring critical care services.     Attending: I have personally and independently provided 45 minutes of critical care services.  This excludes any time spent on separate procedures or teaching. Summary:  62 yo male s/p R M1/M2 thrombectomy for R MCA stroke on 7/10, POD1    NEURO:  q1h neuro checks; CT brain in AM; stroke neurology following       CT (7/11) awaiting read    CARDS:  -160    PULM:  sat > 92%    RENAL:  IVL when tolerating PO    GASTRO:  advance as tolerated       Stress ulcer prophylaxis:  PPI    HEME:  monitor H/H         DVT prophylaxis: SCDs, hold anticoagulation, fresh post-angio, awaiting CT read (7/11)       VTE    ENDO:  euglycemia (-180)    ID:  afebrile    Code status:  Full code  Disposition:  ICU    This patient was at high risk of neurologic deterioration and/or death due to: stroke    ATTENDING STATEMENT:  Patient is critically ill, requiring critical care services.     Attending: I have personally and independently provided 45 minutes of critical care services.  This excludes any time spent on separate procedures or teaching. Summary:  64 yo male s/p R M1/M2 thrombectomy for R MCA stroke on 7/10, POD1    NEURO:  q1h neuro checks; CT brain in AM; stroke neurology following       CT (7/11) awaiting read       ASA started 7/11       TTE with bubble study    CARDS:  -160    PULM:  sat > 92%    RENAL:  IVL    GASTRO:  advance as tolerated       Stress ulcer prophylaxis:  PPI    HEME:  monitor H/H         DVT prophylaxis: SCDs, hold anticoagulation, fresh post-angio, awaiting CT read (7/11)       VTE    ENDO:  euglycemia (-180)    ID:  afebrile    Misc: Pt stable, plan to move to stroke floor    Code status:  Full code  Disposition:  ICU    This patient was at high risk of neurologic deterioration and/or death due to: stroke    ATTENDING STATEMENT:  Patient is critically ill, requiring critical care services.     Attending: I have personally and independently provided 45 minutes of critical care services.  This excludes any time spent on separate procedures or teaching.

## 2023-07-11 NOTE — PHYSICAL THERAPY INITIAL EVALUATION ADULT - STRENGTHENING, PT EVAL
GOAL: Pt will improve b/l  (UE/LE) strength by at least 1/2 MMT grade within 4 weeks to assist with performing functional mobility and ADLs.

## 2023-07-11 NOTE — PHYSICAL THERAPY INITIAL EVALUATION ADULT - ADDITIONAL COMMENTS
Pt. lives in coop apt. with wife, steps/elevator to get in. Pt drives, uses glasses for distance. Pt R hand dominant. No HHA services.

## 2023-07-11 NOTE — PHYSICAL THERAPY INITIAL EVALUATION ADULT - IMPAIRMENTS FOUND, PT EVAL
aerobic capacity/endurance/arousal, attention, and cognition/cognitive impairment/decreased midline orientation/ergonomics and body mechanics/fine motor/gait, locomotion, and balance/gross motor/muscle strength/poor safety awareness/ROM

## 2023-07-12 PROCEDURE — 99233 SBSQ HOSP IP/OBS HIGH 50: CPT

## 2023-07-12 PROCEDURE — 99222 1ST HOSP IP/OBS MODERATE 55: CPT

## 2023-07-12 RX ORDER — ATORVASTATIN CALCIUM 80 MG/1
80 TABLET, FILM COATED ORAL AT BEDTIME
Refills: 0 | Status: DISCONTINUED | OUTPATIENT
Start: 2023-07-12 | End: 2023-07-14

## 2023-07-12 RX ORDER — MAGNESIUM SULFATE 500 MG/ML
1 VIAL (ML) INJECTION ONCE
Refills: 0 | Status: COMPLETED | OUTPATIENT
Start: 2023-07-12 | End: 2023-07-12

## 2023-07-12 RX ORDER — POTASSIUM CHLORIDE 20 MEQ
20 PACKET (EA) ORAL ONCE
Refills: 0 | Status: COMPLETED | OUTPATIENT
Start: 2023-07-12 | End: 2023-07-12

## 2023-07-12 RX ADMIN — Medication 650 MILLIGRAM(S): at 10:00

## 2023-07-12 RX ADMIN — Medication 1 DROP(S): at 17:16

## 2023-07-12 RX ADMIN — Medication 1 DROP(S): at 05:03

## 2023-07-12 RX ADMIN — Medication 650 MILLIGRAM(S): at 09:00

## 2023-07-12 RX ADMIN — SENNA PLUS 2 TABLET(S): 8.6 TABLET ORAL at 21:56

## 2023-07-12 RX ADMIN — ATORVASTATIN CALCIUM 80 MILLIGRAM(S): 80 TABLET, FILM COATED ORAL at 21:56

## 2023-07-12 RX ADMIN — Medication 81 MILLIGRAM(S): at 11:16

## 2023-07-12 RX ADMIN — Medication 102 GRAM(S): at 03:42

## 2023-07-12 RX ADMIN — Medication 1 DROP(S): at 00:17

## 2023-07-12 RX ADMIN — POLYETHYLENE GLYCOL 3350 17 GRAM(S): 17 POWDER, FOR SOLUTION ORAL at 11:16

## 2023-07-12 RX ADMIN — ENOXAPARIN SODIUM 40 MILLIGRAM(S): 100 INJECTION SUBCUTANEOUS at 17:16

## 2023-07-12 RX ADMIN — Medication 20 MILLIEQUIVALENT(S): at 11:16

## 2023-07-12 NOTE — CONSULT NOTE ADULT - SUBJECTIVE AND OBJECTIVE BOX
HPI:  Patient John Lozano is a 64yo R handed M PMHx recent amyloidosis diagnosis presents to ED for dysarthria, L hemiparesis. Patient accompanied by daughter and wife who all state LKW 9PM 7/9/23. Patient during yesterday felt generally tired as he does from amyloidosis diagnosis but was able to ambulate to [facility], go to the beach. Then after 9PM last night, he states he might have felt a little weak in his "R leg" (but suspect L leg given exam) and slipped in the bathroom and was down for <1 hour and found by spouse. Then today AM when daughter returned from [place], she noted he was slurring with weakness for which he came to ED. Not on ac/ ap agents. Here, NIHSS 9. preMRS 0.  (10 Jul 2023 13:23)    Patient was admitted on 7/10, s/p thrombectomy, seen today,     REVIEW OF SYSTEMS  Constitutional - No fever, No weight loss, No fatigue  HEENT - No eye pain, No visual disturbances, No difficulty hearing, No tinnitus, No vertigo, No neck pain  Respiratory - No cough, No wheezing, No shortness of breath  Cardiovascular - No chest pain, No palpitations  Gastrointestinal - No abdominal pain, No nausea, No vomiting, No diarrhea, No constipation  Genitourinary - No dysuria, No frequency, No hematuria, No incontinence  Psychiatric - No depression, No anxiety    VITALS  T(C): 36.7 (07-12-23 @ 07:00), Max: 36.9 (07-11-23 @ 15:00)  HR: 73 (07-12-23 @ 10:00) (57 - 87)  BP: 109/68 (07-12-23 @ 10:00) (97/65 - 122/69)  RR: 19 (07-12-23 @ 10:00) (12 - 23)  SpO2: 96% (07-12-23 @ 10:00) (92% - 100%)  Wt(kg): --    PAST MEDICAL & SURGICAL HISTORY  see HPI     SOCIAL HISTORY  Smoking - Denied  EtOH - Denied   Drugs - Denied    FUNCTIONAL HISTORY  Lives with wife, elevator apt   Independent AMB and ADLs PTA     CURRENT FUNCTIONAL STATUS  7/11 PT  bed mobility min assist   transfers min assist x 2, knees blocked   gait min assist x 2, 3 feet     7/11 OT  bed mobility CG  transfers min assist x 2  LB dressing max assist     FAMILY HISTORY   no pertinent history in first degree relatives     RECENT LABS/IMAGING  CBC Full  -  ( 11 Jul 2023 21:34 )  WBC Count : 12.38 K/uL  RBC Count : 4.54 M/uL  Hemoglobin : 13.3 g/dL  Hematocrit : 41.0 %  Platelet Count - Automated : 209 K/uL  Mean Cell Volume : 90.3 fl  Mean Cell Hemoglobin : 29.3 pg  Mean Cell Hemoglobin Concentration : 32.4 gm/dL  Auto Neutrophil # : x  Auto Lymphocyte # : x  Auto Monocyte # : x  Auto Eosinophil # : x  Auto Basophil # : x  Auto Neutrophil % : x  Auto Lymphocyte % : x  Auto Monocyte % : x  Auto Eosinophil % : x  Auto Basophil % : x    07-11    137  |  105  |  11  ----------------------------<  112<H>  3.9   |  21<L>  |  0.98    Ca    9.0      11 Jul 2023 21:34  Phos  3.1     07-11  Mg     1.8     07-11    TPro  7.4  /  Alb  3.7  /  TBili  1.2  /  DBili  x   /  AST  52<H>  /  ALT  41  /  AlkPhos  125<H>  07-10    Urinalysis Basic - ( 11 Jul 2023 21:34 )    Color: x / Appearance: x / SG: x / pH: x  Gluc: 112 mg/dL / Ketone: x  / Bili: x / Urobili: x   Blood: x / Protein: x / Nitrite: x   Leuk Esterase: x / RBC: x / WBC x   Sq Epi: x / Non Sq Epi: x / Bacteria: x    < from: CT Head No Cont (07.11.23 @ 09:41) >    IMPRESSION: Redemonstrated acute right MCA territory infarct without   hemorrhagic conversion    < end of copied text >        ALLERGIES  No Known Allergies      MEDICATIONS   acetaminophen     Tablet .. 650 milliGRAM(s) Oral every 6 hours PRN  artificial tears (preservative free) Ophthalmic Solution 1 Drop(s) Both EYES every 6 hours  aspirin  chewable 81 milliGRAM(s) Oral daily  atorvastatin 80 milliGRAM(s) Oral at bedtime  chlorhexidine 4% Liquid 1 Application(s) Topical daily  enoxaparin Injectable 40 milliGRAM(s) SubCutaneous <User Schedule>  ondansetron Injectable 4 milliGRAM(s) IV Push once PRN  oxyCODONE    IR 5 milliGRAM(s) Oral every 4 hours PRN  oxyCODONE    IR 10 milliGRAM(s) Oral every 4 hours PRN  polyethylene glycol 3350 17 Gram(s) Oral daily  potassium chloride    Tablet ER 20 milliEquivalent(s) Oral once  senna 2 Tablet(s) Oral at bedtime      ----------------------------------------------------------------------------------------  PHYSICAL EXAM  Constitutional - NAD, Comfortable  HEENT - NCAT, EOMI  Neck - Supple, No limited ROM  Chest - Breathing comfortably, No wheezing  Cardiovascular - S1S2   Abdomen - Soft   Extremities - No C/C/E, No calf tenderness   Neurologic Exam -                    Cognitive - Awake, Alert, AAO to self, place, date, year, situation     Communication - Fluent, No dysarthria     Cranial Nerves - CN 2-12 intact     Motor - No focal deficits                    LEFT    UE - ShAB 5/5, EF 5/5, EE 5/5, WE 5/5,  5/5                    RIGHT UE - ShAB 5/5, EF 5/5, EE 5/5, WE 5/5,  5/5                    LEFT    LE - HF 5/5, KE 5/5, DF 5/5, PF 5/5                    RIGHT LE - HF 5/5, KE 5/5, DF 5/5, PF 5/5        Sensory - Intact to LT     Psychiatric - Mood stable, Affect WNL  ----------------------------------------------------------------------------------------  ASSESSMENT/PLAN  63yMale h/o cardiac amyloid with functional deficits after CVA  s/p thrombectomy  CT with Rt MCA infarct   Pain - Tylenol, oxycodone prn  DVT PPX - SCDs lovenox   Rehab - Will continue to follow for ongoing rehab needs and recommendations.    Recommend ACUTE inpatient rehabilitation for the functional deficits consisting of 3 hours of therapy/day & 24 hour RN/daily PMR physician for comorbid medical management. Patient will be able to tolerate 3 hours a day.    HPI:  Patient John Lozano is a 64yo R handed M PMHx recent amyloidosis diagnosis presents to ED for dysarthria, L hemiparesis. Patient accompanied by daughter and wife who all state LKW 9PM 7/9/23. Patient during yesterday felt generally tired as he does from amyloidosis diagnosis but was able to ambulate to [facility], go to the beach. Then after 9PM last night, he states he might have felt a little weak in his "R leg" (but suspect L leg given exam) and slipped in the bathroom and was down for <1 hour and found by spouse. Then today AM when daughter returned from [place], she noted he was slurring with weakness for which he came to ED. Not on ac/ ap agents. Here, NIHSS 9. preMRS 0.  (10 Jul 2023 13:23)    Patient was admitted on 7/10, s/p thrombectomy, seen today, wife, daughter at bedside.  Patient had some numbness in hand prior to admission, no weakness.     REVIEW OF SYSTEMS  unable to obtain     VITALS  T(C): 36.7 (07-12-23 @ 07:00), Max: 36.9 (07-11-23 @ 15:00)  HR: 73 (07-12-23 @ 10:00) (57 - 87)  BP: 109/68 (07-12-23 @ 10:00) (97/65 - 122/69)  RR: 19 (07-12-23 @ 10:00) (12 - 23)  SpO2: 96% (07-12-23 @ 10:00) (92% - 100%)  Wt(kg): --    PAST MEDICAL & SURGICAL HISTORY  see HPI     SOCIAL HISTORY  Smoking - Denied  EtOH - Denied   Drugs - Denied    FUNCTIONAL HISTORY  Lives with wife, elevator apt   Independent AMB and ADLs PTA     CURRENT FUNCTIONAL STATUS  7/12 SLP  cognitive linguistic deficits  mild dysarthria  reduced level of alertness    7/11 PT  bed mobility min assist   transfers min assist x 2, knees blocked   gait min assist x 2, 3 feet     7/11 OT  bed mobility CG  transfers min assist x 2  LB dressing max assist     FAMILY HISTORY   no pertinent history in first degree relatives     RECENT LABS/IMAGING  CBC Full  -  ( 11 Jul 2023 21:34 )  WBC Count : 12.38 K/uL  RBC Count : 4.54 M/uL  Hemoglobin : 13.3 g/dL  Hematocrit : 41.0 %  Platelet Count - Automated : 209 K/uL  Mean Cell Volume : 90.3 fl  Mean Cell Hemoglobin : 29.3 pg  Mean Cell Hemoglobin Concentration : 32.4 gm/dL  Auto Neutrophil # : x  Auto Lymphocyte # : x  Auto Monocyte # : x  Auto Eosinophil # : x  Auto Basophil # : x  Auto Neutrophil % : x  Auto Lymphocyte % : x  Auto Monocyte % : x  Auto Eosinophil % : x  Auto Basophil % : x    07-11    137  |  105  |  11  ----------------------------<  112<H>  3.9   |  21<L>  |  0.98    Ca    9.0      11 Jul 2023 21:34  Phos  3.1     07-11  Mg     1.8     07-11    TPro  7.4  /  Alb  3.7  /  TBili  1.2  /  DBili  x   /  AST  52<H>  /  ALT  41  /  AlkPhos  125<H>  07-10    Urinalysis Basic - ( 11 Jul 2023 21:34 )    Color: x / Appearance: x / SG: x / pH: x  Gluc: 112 mg/dL / Ketone: x  / Bili: x / Urobili: x   Blood: x / Protein: x / Nitrite: x   Leuk Esterase: x / RBC: x / WBC x   Sq Epi: x / Non Sq Epi: x / Bacteria: x    < from: CT Head No Cont (07.11.23 @ 09:41) >    IMPRESSION: Redemonstrated acute right MCA territory infarct without   hemorrhagic conversion    < end of copied text >        ALLERGIES  No Known Allergies      MEDICATIONS   acetaminophen     Tablet .. 650 milliGRAM(s) Oral every 6 hours PRN  artificial tears (preservative free) Ophthalmic Solution 1 Drop(s) Both EYES every 6 hours  aspirin  chewable 81 milliGRAM(s) Oral daily  atorvastatin 80 milliGRAM(s) Oral at bedtime  chlorhexidine 4% Liquid 1 Application(s) Topical daily  enoxaparin Injectable 40 milliGRAM(s) SubCutaneous <User Schedule>  ondansetron Injectable 4 milliGRAM(s) IV Push once PRN  oxyCODONE    IR 5 milliGRAM(s) Oral every 4 hours PRN  oxyCODONE    IR 10 milliGRAM(s) Oral every 4 hours PRN  polyethylene glycol 3350 17 Gram(s) Oral daily  potassium chloride    Tablet ER 20 milliEquivalent(s) Oral once  senna 2 Tablet(s) Oral at bedtime      ----------------------------------------------------------------------------------------  PHYSICAL EXAM  Constitutional - NAD, Comfortable, laying in bed   Chest - Breathing comfortably, room air   Cardiovascular - S1S2   Abdomen - Soft   Extremities - No C/C/E, No calf tenderness   Neurologic Exam -                    Cognitive - lethargic/eyes closed         Psychiatric - Mood stable, Affect WNL  ----------------------------------------------------------------------------------------  ASSESSMENT/PLAN  63yMale h/o cardiac amyloid with functional deficits after CVA, with dysarthria   s/p thrombectomy  CT with Rt MCA infarct   Pain - Tylenol, oxycodone prn  DVT PPX - SCDs lovenox   Rehab - Will continue to follow for ongoing rehab needs and recommendations.    Recommend ACUTE inpatient rehabilitation for the functional deficits consisting of 3 hours of therapy/day & 24 hour RN/daily PMR physician for comorbid medical management. Patient will be able to tolerate 3 hours a day.

## 2023-07-12 NOTE — DIETITIAN INITIAL EVALUATION ADULT - NSFNSGIIOFT_GEN_A_CORE
-Last BM PTA. On bowel regimen (senna, Miralax). Prescribed Zofran PRN.   -Prescribed KCl supplement. Team continues to trend electrolytes and replete PRN.

## 2023-07-12 NOTE — PROGRESS NOTE ADULT - ATTENDING COMMENTS
I reviewed available diagnostic studies, and reviewed images personally. I agree with resident's history, exam, orders placed, and plan of care. Medical issues needing to be addressed include: cerebral infarction w/ cerebral edema in R MCA territory, hx of amyloidosis, dysarthria, L hemiparesis, dysarthria. CTA showed R distal bifurcation M1 occlusion, CTh showed evolving inferior territory ischemia, w/ CTP showing involvement of much larger MCA territory w/ large penumbrae. Pt taken to thrombectomy with TICI 2a reperfusion. Exam as above. Cardiology consult given hx and echo findings. OK for stroke unit. Service provided 7/12/2023.
62 yo RH man with h/o HTN, HLD and cardiac amyloidosis with HFrEF with EF 43%, admitted 7/10 with dysarthria and L sided weakness, CTH with inferior L MCA division hypodensity, CTA with R distal M1 occlusion and a large penumbra s/p MT with TICI 2A recanalization. CTA without hemodynamically significant stenosis in the carotid arteries.     Overnight with worsening exam in the setting of SBP in the 90s. CTH stable except for some small strokes in the superior L MCA territory, no hemorrhagic conversion. CTA with occlusion of inferior R M2 branches.     On exam, alert, oriented to self, hospital and month but not year, speech is fluent, follows commands, PERRL, with R gaze preference but able to cross midline, no BTT on the L, L facial, with L PD, with L LE 4/5, full strength on the R    Mechanism of stoke ESUS.     f/u LDL and HgA1C  TTE with bubble study  LE dopplers and TELE  start ASA, c/w atorva 40 mg daily   holding home Lasix 40 mg daily, restart tomorrow  -160   IVL  soft diet  start Lov ppx     Patient seen and examined by attending on 7/11/2023.    Patient is critically ill due to stroke s/p mechanical thrombectomy and at high risk for neurological deterioration or death due to: at risk of hemorrhagic conversion of stroke, at risk for stroke expansion, requiring close neurologic monitoring.

## 2023-07-12 NOTE — SPEECH LANGUAGE PATHOLOGY EVALUATION - SLP DIAGNOSIS
Patient presents with: 1) Cognitive linguistic deficits 2) Mild dysarthria. Suspect exam may have also been negatively impacted by reduced level of alertness at time of assessment. 64 yo male s/p R M1/M2 thrombectomy for R MCA stroke on 7/10. Patient presents with: 1) Cognitive linguistic deficits 2) Mild dysarthria. Suspect exam may have also been negatively impacted by reduced level of alertness at time of assessment.

## 2023-07-12 NOTE — PROGRESS NOTE ADULT - ASSESSMENT
Summary:  64 yo male s/p R M1/M2 thrombectomy for R MCA stroke on 7/10, POD1    NEURO:  q1h neuro checks; CT brain in AM; stroke neurology following       CT (7/11) awaiting read       ASA started 7/11       TTE with bubble study    CARDS:  -160    PULM:  sat > 92%    RENAL:  IVL    GASTRO:  advance as tolerated       Stress ulcer prophylaxis:  PPI    HEME:  monitor H/H         DVT prophylaxis: SCDs, hold anticoagulation, fresh post-angio, awaiting CT read (7/11)       VTE    ENDO:  euglycemia (-180)    ID:  afebrile    Misc: Pt stable, plan to move to stroke floor    Code status:  Full code  Disposition:  ICU    This patient was at high risk of neurologic deterioration and/or death due to: stroke    ATTENDING STATEMENT:  Patient is critically ill, requiring critical care services.     Attending: I have personally and independently provided 45 minutes of critical care services.  This excludes any time spent on separate procedures or teaching. Summary:  62 yo male s/p R M1/M2 thrombectomy for R MCA stroke on 7/10, POD2    NEURO:  q1h neuro checks; CT brain in AM; stroke neurology following       CT (7/11) unchanged        ASA started 7/11       TTE with bubble study done 7/11, 36% EF, L ventricular diastolic dysfunction     CARDS:  -160    PULM:  sat > 92%    RENAL:  IVL    GASTRO:  advance as tolerated       Stress ulcer prophylaxis:  PPI    HEME:  monitor H/H         DVT prophylaxis: SCDs, start chemoprophylaxis CT stable 7/11       VTE negative (7/11)    ENDO:  euglycemia (-180)    ID:  afebrile    Misc: Pt stable, plan to move to stroke floor    Code status:  Full code  Disposition:  ICU    This patient was at high risk of neurologic deterioration and/or death due to: stroke    ATTENDING STATEMENT:  Patient is critically ill, requiring critical care services.     Attending: I have personally and independently provided 45 minutes of critical care services.  This excludes any time spent on separate procedures or teaching. Summary:  64 yo male s/p R M1/M2 thrombectomy for R MCA stroke on 7/10, POD2    NEURO:  q2h neuro checks; CT brain in AM; stroke neurology following       CT (7/11) unchanged        ASA started 7/11       TTE with bubble study done 7/11, 36% EF, L ventricular diastolic dysfunction     CARDS:  -160    PULM:  sat > 92%    RENAL:  IVL    GASTRO:  advance as tolerated       Stress ulcer prophylaxis:  PPI    HEME:  monitor H/H         DVT prophylaxis: SCDs, start chemoprophylaxis CT stable 7/11       VTE negative (7/11)    ENDO:  euglycemia (-180)    ID:  afebrile    Misc: Pt stable, plan to move to stroke floor    Code status:  Full code  Disposition:  ICU    This patient was at high risk of neurologic deterioration and/or death due to: stroke Summary:  64 yo male s/p R M1/M2 thrombectomy for R MCA stroke on 7/10, POD2    NEURO:  q2h neuro checks; CT brain in AM; stroke neurology following       CT (7/11) unchanged        ASA started 7/11       TTE with bubble study done 7/11, 36% EF, L ventricular diastolic dysfunction     CARDS:  -160 mmhg   EF 36 %     PULM:  sat > 92%    RENAL:  IVL    GASTRO:  advance as tolerated       Stress ulcer prophylaxis:  PPI    HEME:  monitor H/H         DVT prophylaxis: SCDs, start chemoprophylaxis CT stable 7/11       VTE negative (7/11)    ENDO:  euglycemia (-180)    ID:  afebrile    Misc: Pt stable, plan to move to stroke floor    Code status:  Full code  Disposition:  ICU    This patient was at high risk of neurologic deterioration and/or death due to: stroke

## 2023-07-12 NOTE — DIETITIAN INITIAL EVALUATION ADULT - PERTINENT MEDS FT
MEDICATIONS  (STANDING):  artificial tears (preservative free) Ophthalmic Solution 1 Drop(s) Both EYES every 6 hours  aspirin  chewable 81 milliGRAM(s) Oral daily  atorvastatin 80 milliGRAM(s) Oral at bedtime  chlorhexidine 4% Liquid 1 Application(s) Topical daily  enoxaparin Injectable 40 milliGRAM(s) SubCutaneous <User Schedule>  polyethylene glycol 3350 17 Gram(s) Oral daily  potassium chloride    Tablet ER 20 milliEquivalent(s) Oral once  senna 2 Tablet(s) Oral at bedtime    MEDICATIONS  (PRN):  acetaminophen     Tablet .. 650 milliGRAM(s) Oral every 6 hours PRN Temp greater or equal to 38C (100.4F), Mild Pain (1 - 3)  ondansetron Injectable 4 milliGRAM(s) IV Push once PRN Nausea and/or Vomiting  oxyCODONE    IR 5 milliGRAM(s) Oral every 4 hours PRN Moderate Pain (4 - 6)  oxyCODONE    IR 10 milliGRAM(s) Oral every 4 hours PRN Severe Pain (7 - 10)

## 2023-07-12 NOTE — PROGRESS NOTE ADULT - ASSESSMENT
Patient seen today with attending and team on rounds. Please also refer to attending addendum on initial admission note dated 7/10.     Exam stable except: improving LUE (4+/5)    Impression: Dysarthria, LHH, L hemiparesis from R MCA territory ischemic stroke s/p R M1/M2 thrombectomy for R MCA stroke on 7/10, improving mentation and LUE strength, mechanism : Embolic stroke of undetermined stroke     Plan:  [x] CT/CTA: CT (7/11) unchanged   [x] ASA started 7/11  [x] s/p  R M1 Thrombectomy TICI 2A  [x] MRI  [x] TTE w/ bubble study done and reviewed : no thrombus but low EF (36%), with L ventricular diastolic dysfunction, cardiac amyloidosis, per Cards: keep patient off Lasix, maintain -160 mmhg  [x] A1C, LDL  [x] Atorvastatin 80 [] not on high intensity statin due to  [x] Physical therapy/OT/Speech Language: Plan for discharge to [facility] per PM&R attending: They recommend ACUTE inpatient rehabilitation for the functional deficits consisting of 3 hours of therapy/day & 24 hour RN/daily PMR physician for comorbid medical management. Patient will be able to tolerate 3 hours a day. physical exam showed today some improvement in strength in LEs    Case & Plan discussed with patient and family. All questions answered.    CORE MEASURES:         Admission NIHSS: 9      Tenecteplase: [] YES [x] NO     Statin Therapy: [x] YES [] NO     Smoking [] YES [x] NO     Afib [] YES [x] NO     Stroke Education [x] YES [] NO    Dysphagia screen : [] Passed [] Failed- S&S pending [] Pending    DVT ppx: Venodynes [X] Heparin s.c [] LMWH [X] Not on chemical DVT ppx due to:   Patient seen today with attending and team on rounds. Please also refer to attending addendum on initial admission note dated 7/10.     Exam stable except: improving LUE (4+/5)    Impression: Dysarthria, LHH, L hemiparesis from R MCA territory ischemic stroke s/p R M1/M2 thrombectomy for R MCA stroke on 7/10, improving mentation and LUE strength, mechanism : Embolic stroke of undetermined stroke     Plan:  [x] CT/CTA: CT (7/11) unchanged   [x] ASA started 7/11  [x] s/p  R M1 Thrombectomy TICI 2A  [x] MRI  [x] TTE w/ bubble study done and reviewed : no thrombus but low EF (36%), with L ventricular diastolic dysfunction, cardiac amyloidosis, per Cards: keep patient off Lasix, maintain -160 mmhg  [x] A1C, LDL  [x] Atorvastatin 80 [] not on high intensity statin due to  [x] Physical therapy/OT/Speech Language: Plan for discharge to [facility] per PM&R attending: They recommend ACUTE inpatient rehabilitation for the functional deficits consisting of 3 hours of therapy/day & 24 hour RN/daily PMR physician for comorbid medical management. Patient will be able to tolerate 3 hours a day. physical exam showed today some improvement in strength in LEs    Case & Plan discussed with patient and family. All questions answered.    CORE MEASURES:         Admission NIHSS: 9      Tenecteplase: [] YES [x] NO     Statin Therapy: [x] YES [] NO     Smoking [] YES [x] NO     Afib [] YES [x] NO     Stroke Education [x] YES [] NO    Dysphagia screen : [x] Passed - recommended soft & bite sized, which patient is on now. [] Failed- S&S pending [] Pending    DVT ppx: Venodynes [X] Heparin s.c [] LMWH [X] Not on chemical DVT ppx due to:

## 2023-07-12 NOTE — SPEECH LANGUAGE PATHOLOGY EVALUATION - SLP PERTINENT HISTORY OF CURRENT PROBLEM
62yo R handed M PMHx recent amyloidosis (Ejection Fraction 43) diagnosis presents to ED for dysarthria, L hemiparesis. Patient accompanied by daughter and wife who all state LKW 9PM 7/9/23. Patient during yesterday felt generally tired as he does from amyloidosis diagnosis but was able to ambulate to [facility], go to the beach. Then after 9PM last night, he states he might have felt a little weak in his "R leg" (but suspect L leg given exam) and slipped in the bathroom and was down for <1 hour and found by spouse. Then today AM when daughter returned from [place], she noted he was slurring with weakness for which he came to ED. Not on ac/ ap agents. Here, NIHSS 9. preMRS 0.  (10 Jul 2023 13:23). Exam with dysarthria, R gaze deviation (able to cross midline), L hemiparesis, inattention to L side.

## 2023-07-12 NOTE — SPEECH LANGUAGE PATHOLOGY EVALUATION - SLP GENERAL OBSERVATIONS
Pt encountered OOB to chair, awake/alert, on room air, daughter at bedside. Cooperative with exam, though with periods of reduced alertness, closing eyes, requiring frequent verbal/tactile stimuli to sustain engagement.

## 2023-07-12 NOTE — DIETITIAN INITIAL EVALUATION ADULT - OTHER INFO
Dosing wt (7/10): 178.6 lbs  Pt reports  lbs. Denies history of significant changes. Will continue to monitor/trend.   At risk for wt fluctuations r/t history of taking diuretic PTA (Lasix).

## 2023-07-12 NOTE — DIETITIAN INITIAL EVALUATION ADULT - REASON FOR ADMISSION
Pt is a 64 yo R handed M with PMH: recent amyloidosis. Admitted with dysarthria, L hemiparesis. S/P R M1 thrombectomy TICI 2A. Noted with episode of hypotension 7/11; stat CT showed new developing infarct in the right middle cerebral artery vascular territory.

## 2023-07-12 NOTE — SPEECH LANGUAGE PATHOLOGY EVALUATION - COMMENTS
Impression: dysarthia, R gaze deviation, LHH, L hemiparesis from R MCA territory ischemic stroke with R distal M1 occlusion. Mechanism Embolic stroke of undetermined source. 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   7/10 R m1 thrombectomy TICI 2A  7/11: Episode of hypotension overnight, resolved after NS bolus, Stat CT showing new developing infarct in the right middle cerebral artery vascular territory    SLP hx: Pt is new to this service; passed dysphagia screen 7/10. Did not assess Significant deficits noted with clock-drawing task, both conceptually and spatially. Picture description task: pt unable to identify more abstract elements of picture, requires frequent prompts to identify concrete elements

## 2023-07-12 NOTE — DIETITIAN INITIAL EVALUATION ADULT - PERTINENT LABORATORY DATA
07-11    137  |  105  |  11  ----------------------------<  112<H>  3.9   |  21<L>  |  0.98    Ca    9.0      11 Jul 2023 21:34  Phos  3.1     07-11  Mg     1.8     07-11    TPro  7.4  /  Alb  3.7  /  TBili  1.2  /  DBili  x   /  AST  52<H>  /  ALT  41  /  AlkPhos  125<H>  07-10

## 2023-07-12 NOTE — DIETITIAN INITIAL EVALUATION ADULT - ADD RECOMMEND
1. Defer consistency of PO diet to medical team, SLP. May continue no therapeutic restrictions at this time. Consider low sodium diet if signs of fluid overload noted.   2. Encourage and monitor PO intake. Encourage use of daily menus. Honor dietary preferences as expressed as able.  3. Recommend multivitamin (if no medication contraindications) to aid in prevention of micronutrient deficiencies.   4. Monitor wt trends/labs/skin integrity/hydration status/bowel regularity.   5. RD to add Mighty Shakes 2x daily to promote optimal energy/protein intake.

## 2023-07-12 NOTE — DIETITIAN INITIAL EVALUATION ADULT - REASON INDICATOR FOR ASSESSMENT
Nutrition Assessment warranted for length of stay.  Information obtained from: RN, comprehensive chart review, interdisciplinary medical rounds, patient/daughter

## 2023-07-12 NOTE — PROGRESS NOTE ADULT - TIME BILLING
right MCA ischemic stroke s/p R M1/M2 thrombectomy for R MCA stroke TICI 2 a , neuro exam stable,   neuro checks q 2 hr, continue aspirin , increase lipitor to 80 mg, PT/OT , TTE no thrombus but low EF , cardiac amyloidosis , cardiology on consult keep lasix on hold for now, no signs of volume overload, SBP goal 100-160 mmhg , RA , IVL, CCD diet , lovenox 40 mg sc qhs, transfer to stroke unit

## 2023-07-12 NOTE — PROGRESS NOTE ADULT - SUBJECTIVE AND OBJECTIVE BOX
Neurology - Progress Note: Vascular    -  Spectra: [id number] ([facility]), [phone] (J)  -    HPI:  Patient John Lozano is a 64yo R handed M PMHx recent amyloidosis (Ejection Fraction 43) diagnosis presents to ED for dysarthria, L hemiparesis. Patient accompanied by daughter and wife who all state LKW 9PM 7/9/23. Patient during yesterday felt generally tired as he does from amyloidosis diagnosis but was able to ambulate to [facility], go to the beach. Then after 9PM last night, he states he might have felt a little weak in his "R leg" (but suspect L leg given exam) and slipped in the bathroom and was down for <1 hour and found by spouse. Then today AM when daughter returned from [place], she noted he was slurring with weakness for which he came to ED. Not on ac/ ap agents. Here, NIHSS 9. preMRS 0.  (10 Jul 2023 13:23)    7/10 R m1 thrombectomy TICI 2A  7/11: Episode of hypotension overnight, resolved after NS bolus, Stat CT showing new developing infarct in the right middle cerebral artery vascular territory  ----------  Interval Hx: Patient seen and evaluated by Stroke team today on rounds 7/12. The patient is sleepy but able to easily arousable to voice and following all commands. Appears to have improvement in his strength in LUE and LLE.     Review of Systems:  CONSTITUTIONAL: No fevers or chills  EYES AND ENT: No visual changes or no throat pain   NECK: No pain or stiffness  RESPIRATORY: No hemoptysis or shortness of breath  CARDIOVASCULAR: No chest pain or palpitations  GASTROINTESTINAL: No melena or hematochezia  GENITOURINARY: No dysuria or hematuria  NEUROLOGICAL: +As stated in HPI above  SKIN: No itching, burning, rashes, or lesions   All other review of systems is negative unless indicated above.    Allergies:  No Known Allergies      PMHx/PSHx/Family Hx: As above, otherwise see below       Social Hx:  No current use of tobacco, alcohol, or illicit drugs      Medications:  MEDICATIONS  (STANDING):  artificial tears (preservative free) Ophthalmic Solution 1 Drop(s) Both EYES every 6 hours  aspirin  chewable 81 milliGRAM(s) Oral daily  atorvastatin 80 milliGRAM(s) Oral at bedtime  enoxaparin Injectable 40 milliGRAM(s) SubCutaneous <User Schedule>  polyethylene glycol 3350 17 Gram(s) Oral daily  senna 2 Tablet(s) Oral at bedtime    MEDICATIONS  (PRN):  acetaminophen     Tablet .. 650 milliGRAM(s) Oral every 6 hours PRN Temp greater or equal to 38C (100.4F), Mild Pain (1 - 3)  ondansetron Injectable 4 milliGRAM(s) IV Push once PRN Nausea and/or Vomiting  oxyCODONE    IR 10 milliGRAM(s) Oral every 4 hours PRN Severe Pain (7 - 10)  oxyCODONE    IR 5 milliGRAM(s) Oral every 4 hours PRN Moderate Pain (4 - 6)      Vitals:  T(C): 36.2 (07-12-23 @ 11:00), Max: 36.9 (07-11-23 @ 19:00)  HR: 65 (07-12-23 @ 14:00) (57 - 87)  BP: 108/74 (07-12-23 @ 14:00) (97/65 - 122/69)  RR: 13 (07-12-23 @ 14:00) (12 - 23)  SpO2: 98% (07-12-23 @ 14:00) (92% - 100%)    Physical Examination:    General - Quiet, somnolent, in no acute distress sitting upright in chair.   Cardiovascular - Peripheral pulses palpable, no edema    Neurologic Exam:  Mental status - somnolent, aroused to voice. able to follow simple and complex commands.   HEENT:  Left visual field deficit (likely LHH)  Cranial nerves - PERRLA, left visual field deficit (LHH), EOMI, face sensation (V1-V3) intact b/l, facial strength intact without asymmetry b/l, hearing intact b/l, palate with symmetric elevation, trapezius 5/5 strength b/l, tongue midline on protrusion with full lateral movement    Motor - Normal bulk and tone throughout. No pronator drift.  Strength testing            Deltoid      Biceps      Triceps     Wrist Extension    Wrist Flexion     Interossei         R            5                 5               5                     5                              5                        5                 5  L            4+            4+              4+                   4+                             4+                   4+             4+            Hip Flexion    Hip Extension    Knee Flexion    Knee Extension    Dorsiflexion    Plantar Flexion  R              5                           5                       5                           5                            5                          5  L            4+                           4+                4+                       4+                           4+                      4+  Sensation - Light touch intact throughout    DTR's -             Biceps      Triceps     Brachioradialis      Patellar    Ankle    Toes/plantar response  R             2+             2+                  2+                       2+            2+                 Down  L              2+             2+                 2+                        2+           2+                 Down    Coordination - Finger to Nose intact b/l. No tremors appreciated    Labs:                        13.3   12.38 )-----------( 209      ( 11 Jul 2023 21:34 )             41.0     07-11    137  |  105  |  11  ----------------------------<  112<H>  3.9   |  21<L>  |  0.98    Ca    9.0      11 Jul 2023 21:34  Phos  3.1     07-11  Mg     1.8     07-11      CAPILLARY BLOOD GLUCOSE            PT/INR - ( 10 Jul 2023 20:37 )   PT: 12.3 sec;   INR: 1.07 ratio         PTT - ( 10 Jul 2023 20:37 )  PTT:25.6 sec  CSF:                  Radiology:  CT Head No Cont:  (11 Jul 2023 09:41)  CT Head No Cont:  (11 Jul 2023 04:54)  7/11  NONCONTRAST HEAD CT SCAN:  1. Redemonstration of large acute infarct in the inferior division of the right MCA vascular territory, involving posterior right temporal lobe, right temporal parietal region, and lateral right occipital lobe.  2. There is new patchy hypoattenuation in the subcortical/deep white matter of the right frontal lobe and new loss of gray matter-white matter differentiation in the right postcentral gyrus, compatible with new developing infarct in the right middle cerebral artery vascular territory.  3. No hemorrhagic conversion.    CT ANGIOGRAPHY NECK:  1. Again seen is a beaded appearance of both internal carotid arteries, suspicious for fibromuscular dysplasia.  2. Partial anomalous pulmonary venous return is noted in the left upper lobe.      CT ANGIOGRAPHY BRAIN: Compared to the prior CTA on 07/10/2023, there is flow related enhancement within the inferior division of the right MCA at site of prior occlusion. Slightly distal to the location of the original occlusion, there are now severe stenoses versus focal occlusions within two proximal M2 branches of the inferior division of the right middle cerebral artery (see key images, 605:58-60, and 5:414-430); and there is now a distal M2 occlusion in the inferior division of the right middle cerebral artery (605:61-62 and 5:447-449).    US Lower-extremity 7/11  No evidence of deep venous thrombosis in either lower extremity    TTE 7/11  1. Technically difficult image quality.   2. Mild left ventricular hypertrophy.   3. There is increased LV mass and concentric hypertrophy.   4. The left ventricular systolic function is moderately decreased with an ejection fraction of 36 % by Harrington's method of disks. There is global left ventricular hypokinesis. No evidence of a thrombus in the left ventricle.   5. There is severe (grade 3) left ventricular diastolic dysfunction.   6. Moderate-severely enlarged right ventricular cavity size, normal right ventricular wall thickness and reduced systolic right ventricular function.   7. Mild to moderate mitral regurgitation.   8. Trileaflet aortic valve with normal systolic excursion.   9. Trace aortic regurgitation.  10. The right atrium is severely dilated.  11. Estimated pulmonary artery systolic pressure is 41 mmHg, consistent with mild pulmonary hypertension.  12. No evidence of a patent foramen ovale by color flow Doppler.  13. Agitated saline injection was negative for intracardiac shunt.  14. No prior echocardiogram is available for comparison.
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
## 2023-07-12 NOTE — DIETITIAN INITIAL EVALUATION ADULT - ORAL INTAKE PTA/DIET HISTORY
-Pt reports good appetite PTA; consumed three meals daily. Denies intolerance to chewing/swallowing. Denies food allergies.    -Dietary preferences noted for oatmeal. Menu provided; ordering procedures explained.   -Denies intake of vitamins/supplements. Hx of taking "water pill".  -Denies nausea/vomiting/constipation/diarrhea.

## 2023-07-12 NOTE — SPEECH LANGUAGE PATHOLOGY EVALUATION - SLP AUTOMATIC SPEECH
frequent trailing off, requires cues to complete - suspect level of alertness negatively impacting performance/impaired

## 2023-07-12 NOTE — SPEECH LANGUAGE PATHOLOGY EVALUATION - SLP BEHAVIORAL OBSERVATIONS
reduced level of alertness, inconsistently closing eyes, requires verbal/tactile cues to sustain attention

## 2023-07-12 NOTE — PROGRESS NOTE ADULT - SUBJECTIVE AND OBJECTIVE BOX
HPI:  Patient John Lozano is a 62yo R handed M PMHx recent amyloidosis (Ejection Fraction 43) diagnosis presents to ED for dysarthria, L hemiparesis. Patient accompanied by daughter and wife who all state LKW 9PM 7/9/23. Patient during yesterday felt generally tired as he does from amyloidosis diagnosis but was able to ambulate to [place], go to the beach. Then after 9PM last night, he states he might have felt a little weak in his "R leg" (but suspect L leg given exam) and slipped in the bathroom and was down for <1 hour and found by spouse. Then today AM when daughter returned from [place], she noted he was slurring with weakness for which he came to ED. Not on ac/ ap agents. Here, NIHSS 9. preMRS 0.  (10 Jul 2023 13:23)    7/10 R m1 thrombectomy TICI 2A  7/11: Episode of hypotension overnight, resolved after NS bolus, Stat CT showing new developing infarct in the right middle cerebral artery vascular territory    On admission, the patient was:  NIHSS: 9    *** HIGH RISK OF DVT PRESENT ON ADMISSION ***    VITALS:  T(C): , Max: 37.1 (07-10-23 @ 19:00)  HR:  (77 - 87)  BP:  (103/86 - 121/75)  ABP: --  RR:  (13 - 26)  SpO2:  (93% - 100%)  Wt(kg): --      07-10-23 @ 07:01  -  07-10-23 @ 22:21  --------------------------------------------------------  IN: 640 mL / OUT: 300 mL / NET: 340 mL        LABS:                          13.9   10.57 )-----------( 217      ( 10 Jul 2023 20:37 )             43.7     07-10    138  |  108  |  15  ----------------------------<  121<H>  4.3   |  18<L>  |  0.91    Ca    9.0      10 Jul 2023 20:37  Phos  3.0     07-10  Mg     1.9     07-10    TPro  7.4  /  Alb  3.7  /  TBili  1.2  /  DBili  x   /  AST  52<H>  /  ALT  41  /  AlkPhos  125<H>  07-10    LIVER FUNCTIONS - ( 10 Jul 2023 12:53 )  Alb: 3.7 g/dL / Pro: 7.4 g/dL / ALK PHOS: 125 U/L / ALT: 41 U/L / AST: 52 U/L / GGT: x           PT/INR - ( 10 Jul 2023 20:37 )   PT: 12.3 sec;   INR: 1.07 ratio         PTT - ( 10 Jul 2023 20:37 )  PTT:25.6 sec  Urinalysis Basic - ( 10 Jul 2023 20:37 )    Color: x / Appearance: x / SG: x / pH: x  Gluc: 121 mg/dL / Ketone: x  / Bili: x / Urobili: x   Blood: x / Protein: x / Nitrite: x   Leuk Esterase: x / RBC: x / WBC x   Sq Epi: x / Non Sq Epi: x / Bacteria: x      IMAGING:   Recent imaging studies were reviewed.    7/11  NONCONTRAST HEAD CT SCAN:  1. Redemonstration of large acute infarct in the inferior division of the right MCA vascular territory, involving posterior right temporal lobe, right temporal parietal region, and lateral right occipital lobe.  2. There is new patchy hypoattenuation in the subcortical/deep white matter of the right frontal lobe and new loss of gray matter-white matter differentiation in the right postcentral gyrus, compatible with new developing infarct in the right middle cerebral artery vascular territory.  3. No hemorrhagic conversion.    CT ANGIOGRAPHY NECK:  1. Again seen is a beaded appearance of both internal carotid arteries, suspicious for fibromuscular dysplasia.  2. Partial anomalous pulmonary venous return is noted in the left upper lobe.      CT ANGIOGRAPHY BRAIN: Compared to the prior CTA on 07/10/2023, there is flow related enhancement within the inferior division of the right MCA at site of prior occlusion. Slightly distal to the location of the original occlusion, there are now severe stenoses versus focal occlusions within two proximal M2 branches of the inferior division of the right middle cerebral artery (see key images, 605:58-60, and 5:414-430); and there is now a distal M2 occlusion in the inferior division of the right middle cerebral artery (605:61-62 and 5:447-449).    MEDICATIONS:  acetaminophen     Tablet .. 650 milliGRAM(s) Oral every 6 hours PRN  atorvastatin 40 milliGRAM(s) Oral at bedtime  chlorhexidine 4% Liquid 1 Application(s) Topical daily  ondansetron Injectable 4 milliGRAM(s) IV Push once PRN  polyethylene glycol 3350 17 Gram(s) Oral daily  senna 2 Tablet(s) Oral at bedtime  sodium chloride 0.9%. 1000 milliLiter(s) IV Continuous <Continuous>    EXAMINATION:  General:  calm  HEENT:  MMM, Left visual field deficit (likely LHH)  Neuro:  awake, alert, oriented x 2-3 to name, place, date (says 2017 but knows month), follows commands,  slightly dysarthria, 5/5 RUE, Left side: 3/5 delt, 4-/5 bicep, 4-/5 tricep, 0/5 in wrist and hand . RLE 5/5, Left side: 4/5 in hip flexion, extension, knee extension and flexion, and dorsiflexion, 5/5 plantar flexion  Cards:  RRR  Respiratory:  no respiratory distress  Adomen:  soft  Extremities:  no edema  Skin:  warm/dry HPI:  Patient John Lozano is a 62yo R handed M PMHx recent amyloidosis (Ejection Fraction 43) diagnosis presents to ED for dysarthria, L hemiparesis. Patient accompanied by daughter and wife who all state LKW 9PM 7/9/23. Patient during yesterday felt generally tired as he does from amyloidosis diagnosis but was able to ambulate to [employer], go to the beach. Then after 9PM last night, he states he might have felt a little weak in his "R leg" (but suspect L leg given exam) and slipped in the bathroom and was down for <1 hour and found by spouse. Then today AM when daughter returned from [place], she noted he was slurring with weakness for which he came to ED. Not on ac/ ap agents. Here, NIHSS 9. preMRS 0.  (10 Jul 2023 13:23)    7/10 R m1 thrombectomy TICI 2A  7/11: Episode of hypotension overnight, resolved after NS bolus, Stat CT showing new developing infarct in the right middle cerebral artery vascular territory  7/12: No overnight events    On admission, the patient was:  NIHSS: 9    *** HIGH RISK OF DVT PRESENT ON ADMISSION ***  T(C): 36.7 (07-12-23 @ 03:00), Max: 36.9 (07-11-23 @ 07:00)  T(F): 98 (07-12-23 @ 03:00), Max: 98.5 (07-11-23 @ 07:00)  HR: 57 (07-12-23 @ 06:00) (57 - 85)  BP: 102/73 (07-12-23 @ 06:00) (97/65 - 123/78)  RR: 20 (07-12-23 @ 06:00) (12 - 23)  SpO2: 99% (07-12-23 @ 06:00) (92% - 100%)  Wt(kg): --        07-10-23 @ 07:01  -  07-10-23 @ 22:21  --------------------------------------------------------  IN: 640 mL / OUT: 300 mL / NET: 340 mL           LABS:                          13.3   12.38 )-----------( 209      ( 11 Jul 2023 21:34 )             41.0     07-11    137  |  105  |  11  ----------------------------<  112<H>  3.9   |  21<L>  |  0.98    Ca    9.0      11 Jul 2023 21:34  Phos  3.1     07-11  Mg     1.8     07-11    TPro  7.4  /  Alb  3.7  /  TBili  1.2  /  DBili  x   /  AST  52<H>  /  ALT  41  /  AlkPhos  125<H>  07-10    LIVER FUNCTIONS - ( 10 Jul 2023 12:53 )  Alb: 3.7 g/dL / Pro: 7.4 g/dL / ALK PHOS: 125 U/L / ALT: 41 U/L / AST: 52 U/L / GGT: x           PT/INR - ( 10 Jul 2023 20:37 )   PT: 12.3 sec;   INR: 1.07 ratio         PTT - ( 10 Jul 2023 20:37 )  PTT:25.6 sec  Urinalysis Basic - ( 11 Jul 2023 21:34 )    Color: x / Appearance: x / SG: x / pH: x  Gluc: 112 mg/dL / Ketone: x  / Bili: x / Urobili: x   Blood: x / Protein: x / Nitrite: x   Leuk Esterase: x / RBC: x / WBC x   Sq Epi: x / Non Sq Epi: x / Bacteria: x        IMAGING:   Recent imaging studies were reviewed.    7/11  NONCONTRAST HEAD CT SCAN:  1. Redemonstration of large acute infarct in the inferior division of the right MCA vascular territory, involving posterior right temporal lobe, right temporal parietal region, and lateral right occipital lobe.  2. There is new patchy hypoattenuation in the subcortical/deep white matter of the right frontal lobe and new loss of gray matter-white matter differentiation in the right postcentral gyrus, compatible with new developing infarct in the right middle cerebral artery vascular territory.  3. No hemorrhagic conversion.    CT ANGIOGRAPHY NECK:  1. Again seen is a beaded appearance of both internal carotid arteries, suspicious for fibromuscular dysplasia.  2. Partial anomalous pulmonary venous return is noted in the left upper lobe.      CT ANGIOGRAPHY BRAIN: Compared to the prior CTA on 07/10/2023, there is flow related enhancement within the inferior division of the right MCA at site of prior occlusion. Slightly distal to the location of the original occlusion, there are now severe stenoses versus focal occlusions within two proximal M2 branches of the inferior division of the right middle cerebral artery (see key images, 605:58-60, and 5:414-430); and there is now a distal M2 occlusion in the inferior division of the right middle cerebral artery (605:61-62 and 5:447-449).    US Lower-extremity 7/11  No evidence of deep venous thrombosis in either lower extremity    TTE 7/11  1. Technically difficult image quality.   2. Mild left ventricular hypertrophy.   3. There is increased LV mass and concentric hypertrophy.   4. The left ventricular systolic function is moderately decreased with an ejection fraction of 36 % by Harrington's method of disks. There is global left ventricular hypokinesis. No evidence of a thrombus in the left ventricle.   5. There is severe (grade 3) left ventricular diastolic dysfunction.   6. Moderate-severely enlarged right ventricular cavity size, normal right ventricular wall thickness and reduced systolic right ventricular function.   7. Mild to moderate mitral regurgitation.   8. Trileaflet aortic valve with normal systolic excursion.   9. Trace aortic regurgitation.  10. The right atrium is severely dilated.  11. Estimated pulmonary artery systolic pressure is 41 mmHg, consistent with mild pulmonary hypertension.  12. No evidence of a patent foramen ovale by color flow Doppler.  13. Agitated saline injection was negative for intracardiac shunt.  14. No prior echocardiogram is available for comparison.      MEDICATIONS:  acetaminophen     Tablet .. 650 milliGRAM(s) Oral every 6 hours PRN  atorvastatin 40 milliGRAM(s) Oral at bedtime  chlorhexidine 4% Liquid 1 Application(s) Topical daily  ondansetron Injectable 4 milliGRAM(s) IV Push once PRN  polyethylene glycol 3350 17 Gram(s) Oral daily  senna 2 Tablet(s) Oral at bedtime  sodium chloride 0.9%. 1000 milliLiter(s) IV Continuous <Continuous>    EXAMINATION:  General:  calm  HEENT:  MMM, Left visual field deficit (likely LHH)  Neuro:  awake, alert, oriented x 2-3 to name, place, date (says 2017 but knows month), follows commands,  slightly dysarthria, 5/5 RUE, Left side: 3/5 delt, 4-/5 bicep, 4-/5 tricep, 0/5 in wrist and hand . RLE 5/5, Left side: 4/5 in hip flexion, extension, knee extension and flexion, and dorsiflexion, 5/5 plantar flexion  Cards:  RRR  Respiratory:  no respiratory distress  Adomen:  soft  Extremities:  no edema  Skin:  warm/dry HPI:  Patient John Lozano is a 62yo R handed M PMHx recent amyloidosis (Ejection Fraction 43) diagnosis presents to ED for dysarthria, L hemiparesis. Patient accompanied by daughter and wife who all state LKW 9PM 7/9/23. Patient during yesterday felt generally tired as he does from amyloidosis diagnosis but was able to ambulate to [employer], go to the beach. Then after 9PM last night, he states he might have felt a little weak in his "R leg" (but suspect L leg given exam) and slipped in the bathroom and was down for <1 hour and found by spouse. Then today AM when daughter returned from [place], she noted he was slurring with weakness for which he came to ED. Not on ac/ ap agents. Here, NIHSS 9. preMRS 0.  (10 Jul 2023 13:23)    7/10 R m1 thrombectomy TICI 2A  7/11: Episode of hypotension overnight, resolved after NS bolus, Stat CT showing new developing infarct in the right middle cerebral artery vascular territory  7/12: No overnight events. LUE is improving. f/u with stroke team.    On admission, the patient was:  NIHSS: 9    *** HIGH RISK OF DVT PRESENT ON ADMISSION ***  T(C): 36.7 (07-12-23 @ 03:00), Max: 36.9 (07-11-23 @ 07:00)  T(F): 98 (07-12-23 @ 03:00), Max: 98.5 (07-11-23 @ 07:00)  HR: 57 (07-12-23 @ 06:00) (57 - 85)  BP: 102/73 (07-12-23 @ 06:00) (97/65 - 123/78)  RR: 20 (07-12-23 @ 06:00) (12 - 23)  SpO2: 99% (07-12-23 @ 06:00) (92% - 100%)  Wt(kg): --        07-10-23 @ 07:01  -  07-10-23 @ 22:21  --------------------------------------------------------  IN: 640 mL / OUT: 300 mL / NET: 340 mL           LABS:                          13.3   12.38 )-----------( 209      ( 11 Jul 2023 21:34 )             41.0     07-11    137  |  105  |  11  ----------------------------<  112<H>  3.9   |  21<L>  |  0.98    Ca    9.0      11 Jul 2023 21:34  Phos  3.1     07-11  Mg     1.8     07-11    TPro  7.4  /  Alb  3.7  /  TBili  1.2  /  DBili  x   /  AST  52<H>  /  ALT  41  /  AlkPhos  125<H>  07-10    LIVER FUNCTIONS - ( 10 Jul 2023 12:53 )  Alb: 3.7 g/dL / Pro: 7.4 g/dL / ALK PHOS: 125 U/L / ALT: 41 U/L / AST: 52 U/L / GGT: x           PT/INR - ( 10 Jul 2023 20:37 )   PT: 12.3 sec;   INR: 1.07 ratio         PTT - ( 10 Jul 2023 20:37 )  PTT:25.6 sec  Urinalysis Basic - ( 11 Jul 2023 21:34 )    Color: x / Appearance: x / SG: x / pH: x  Gluc: 112 mg/dL / Ketone: x  / Bili: x / Urobili: x   Blood: x / Protein: x / Nitrite: x   Leuk Esterase: x / RBC: x / WBC x   Sq Epi: x / Non Sq Epi: x / Bacteria: x        IMAGING:   Recent imaging studies were reviewed.    7/11  NONCONTRAST HEAD CT SCAN:  1. Redemonstration of large acute infarct in the inferior division of the right MCA vascular territory, involving posterior right temporal lobe, right temporal parietal region, and lateral right occipital lobe.  2. There is new patchy hypoattenuation in the subcortical/deep white matter of the right frontal lobe and new loss of gray matter-white matter differentiation in the right postcentral gyrus, compatible with new developing infarct in the right middle cerebral artery vascular territory.  3. No hemorrhagic conversion.    CT ANGIOGRAPHY NECK:  1. Again seen is a beaded appearance of both internal carotid arteries, suspicious for fibromuscular dysplasia.  2. Partial anomalous pulmonary venous return is noted in the left upper lobe.      CT ANGIOGRAPHY BRAIN: Compared to the prior CTA on 07/10/2023, there is flow related enhancement within the inferior division of the right MCA at site of prior occlusion. Slightly distal to the location of the original occlusion, there are now severe stenoses versus focal occlusions within two proximal M2 branches of the inferior division of the right middle cerebral artery (see key images, 605:58-60, and 5:414-430); and there is now a distal M2 occlusion in the inferior division of the right middle cerebral artery (605:61-62 and 5:447-449).    US Lower-extremity 7/11  No evidence of deep venous thrombosis in either lower extremity    TTE 7/11  1. Technically difficult image quality.   2. Mild left ventricular hypertrophy.   3. There is increased LV mass and concentric hypertrophy.   4. The left ventricular systolic function is moderately decreased with an ejection fraction of 36 % by Harrington's method of disks. There is global left ventricular hypokinesis. No evidence of a thrombus in the left ventricle.   5. There is severe (grade 3) left ventricular diastolic dysfunction.   6. Moderate-severely enlarged right ventricular cavity size, normal right ventricular wall thickness and reduced systolic right ventricular function.   7. Mild to moderate mitral regurgitation.   8. Trileaflet aortic valve with normal systolic excursion.   9. Trace aortic regurgitation.  10. The right atrium is severely dilated.  11. Estimated pulmonary artery systolic pressure is 41 mmHg, consistent with mild pulmonary hypertension.  12. No evidence of a patent foramen ovale by color flow Doppler.  13. Agitated saline injection was negative for intracardiac shunt.  14. No prior echocardiogram is available for comparison.      MEDICATIONS:  acetaminophen     Tablet .. 650 milliGRAM(s) Oral every 6 hours PRN  atorvastatin 40 milliGRAM(s) Oral at bedtime  chlorhexidine 4% Liquid 1 Application(s) Topical daily  ondansetron Injectable 4 milliGRAM(s) IV Push once PRN  polyethylene glycol 3350 17 Gram(s) Oral daily  senna 2 Tablet(s) Oral at bedtime  sodium chloride 0.9%. 1000 milliLiter(s) IV Continuous <Continuous>    EXAMINATION:  General:  calm  HEENT:  MMM, Left visual field deficit (likely LHH)  Neuro:  awake, alert, oriented x 2-3 to name, place, date (says 2017 but knows month), follows commands,  slightly dysarthria, 5/5 RUE, Left side: 3/5 delt, 4-/5 bicep, 4-/5 tricep, 0/5 in wrist and hand . RLE 5/5, Left side: 4/5 in hip flexion, extension, knee extension and flexion, and dorsiflexion, 5/5 plantar flexion  Cards:  RRR  Respiratory:  no respiratory distress  Adomen:  soft  Extremities:  no edema  Skin:  warm/dry HPI:  Patient John Lozano is a 62yo R handed M PMHx recent amyloidosis (Ejection Fraction 43) diagnosis presents to ED for dysarthria, L hemiparesis. Patient accompanied by daughter and wife who all state LKW 9PM 7/9/23. Patient during yesterday felt generally tired as he does from amyloidosis diagnosis but was able to ambulate to [facility], go to the beach. Then after 9PM last night, he states he might have felt a little weak in his "R leg" (but suspect L leg given exam) and slipped in the bathroom and was down for <1 hour and found by spouse. Then today AM when daughter returned from [place], she noted he was slurring with weakness for which he came to ED. Not on ac/ ap agents. Here, NIHSS 9. preMRS 0.  (10 Jul 2023 13:23)    7/10 R m1 thrombectomy TICI 2A  7/11: Episode of hypotension overnight, resolved after NS bolus, Stat CT showing new developing infarct in the right middle cerebral artery vascular territory  7/12: No overnight events. LUE is improving. f/u with stroke team.    On admission, the patient was:  NIHSS: 9      T(C): 36.7 (07-12-23 @ 07:00), Max: 36.9 (07-11-23 @ 15:00)  HR: 87 (07-12-23 @ 09:00) (57 - 87)  BP: 122/69 (07-12-23 @ 09:00) (97/65 - 122/69)  RR: 16 (07-12-23 @ 09:00) (12 - 23)  SpO2: 96% (07-12-23 @ 09:00) (92% - 100%)  07-11-23 @ 07:01  -  07-12-23 @ 07:00  --------------------------------------------------------  IN: 550 mL / OUT: 1850 mL / NET: -1300 mL    acetaminophen     Tablet .. 650 milliGRAM(s) Oral every 6 hours PRN  artificial tears (preservative free) Ophthalmic Solution 1 Drop(s) Both EYES every 6 hours  aspirin  chewable 81 milliGRAM(s) Oral daily  atorvastatin 40 milliGRAM(s) Oral at bedtime  chlorhexidine 4% Liquid 1 Application(s) Topical daily  enoxaparin Injectable 40 milliGRAM(s) SubCutaneous <User Schedule>  ondansetron Injectable 4 milliGRAM(s) IV Push once PRN  oxyCODONE    IR 5 milliGRAM(s) Oral every 4 hours PRN  oxyCODONE    IR 10 milliGRAM(s) Oral every 4 hours PRN  polyethylene glycol 3350 17 Gram(s) Oral daily  senna 2 Tablet(s) Oral at bedtime    IMAGING:   Recent imaging studies were reviewed.    7/11  NONCONTRAST HEAD CT SCAN:  1. Redemonstration of large acute infarct in the inferior division of the right MCA vascular territory, involving posterior right temporal lobe, right temporal parietal region, and lateral right occipital lobe.  2. There is new patchy hypoattenuation in the subcortical/deep white matter of the right frontal lobe and new loss of gray matter-white matter differentiation in the right postcentral gyrus, compatible with new developing infarct in the right middle cerebral artery vascular territory.  3. No hemorrhagic conversion.    CT ANGIOGRAPHY NECK:  1. Again seen is a beaded appearance of both internal carotid arteries, suspicious for fibromuscular dysplasia.  2. Partial anomalous pulmonary venous return is noted in the left upper lobe.      CT ANGIOGRAPHY BRAIN: Compared to the prior CTA on 07/10/2023, there is flow related enhancement within the inferior division of the right MCA at site of prior occlusion. Slightly distal to the location of the original occlusion, there are now severe stenoses versus focal occlusions within two proximal M2 branches of the inferior division of the right middle cerebral artery (see key images, 605:58-60, and 5:414-430); and there is now a distal M2 occlusion in the inferior division of the right middle cerebral artery (605:61-62 and 5:447-449).    US Lower-extremity 7/11  No evidence of deep venous thrombosis in either lower extremity    TTE 7/11  1. Technically difficult image quality.   2. Mild left ventricular hypertrophy.   3. There is increased LV mass and concentric hypertrophy.   4. The left ventricular systolic function is moderately decreased with an ejection fraction of 36 % by Harrington's method of disks. There is global left ventricular hypokinesis. No evidence of a thrombus in the left ventricle.   5. There is severe (grade 3) left ventricular diastolic dysfunction.   6. Moderate-severely enlarged right ventricular cavity size, normal right ventricular wall thickness and reduced systolic right ventricular function.   7. Mild to moderate mitral regurgitation.   8. Trileaflet aortic valve with normal systolic excursion.   9. Trace aortic regurgitation.  10. The right atrium is severely dilated.  11. Estimated pulmonary artery systolic pressure is 41 mmHg, consistent with mild pulmonary hypertension.  12. No evidence of a patent foramen ovale by color flow Doppler.  13. Agitated saline injection was negative for intracardiac shunt.  14. No prior echocardiogram is available for comparison.      MEDICATIONS:  acetaminophen     Tablet .. 650 milliGRAM(s) Oral every 6 hours PRN  atorvastatin 40 milliGRAM(s) Oral at bedtime  chlorhexidine 4% Liquid 1 Application(s) Topical daily  ondansetron Injectable 4 milliGRAM(s) IV Push once PRN  polyethylene glycol 3350 17 Gram(s) Oral daily  senna 2 Tablet(s) Oral at bedtime  sodium chloride 0.9%. 1000 milliLiter(s) IV Continuous <Continuous>    EXAMINATION:  General:  calm  HEENT:  MMM, Left visual field deficit (likely LHH)  Neuro:  awake, alert, oriented 3 , follows commands,  slightly dysarthria, 5/5 RUE, Left side: 3/5 delt, 4-/5 bicep, 4-/5 tricep,  3/5 hand . RLE 5/5, Left side: 4/5 in hip flexion, extension, knee extension and flexion, and dorsiflexion, 5/5 plantar flexion  Cards:  RRR  Respiratory:  no respiratory distress  Adomen:  soft  Extremities:  no edema  Skin:  warm/dry      LABS:  Na: 137 (07-11 @ 21:34), 138 (07-10 @ 20:37), 137 (07-10 @ 14:08), 135 (07-10 @ 12:53)  K: 3.9 (07-11 @ 21:34), 4.3 (07-10 @ 20:37), 4.0 (07-10 @ 14:08), 6.6 (07-10 @ 12:53)  Cl: 105 (07-11 @ 21:34), 108 (07-10 @ 20:37), 106 (07-10 @ 14:08), 105 (07-10 @ 12:53)  CO2: 21 (07-11 @ 21:34), 18 (07-10 @ 20:37), 21 (07-10 @ 14:08), 19 (07-10 @ 12:53)  BUN: 11 (07-11 @ 21:34), 15 (07-10 @ 20:37), 17 (07-10 @ 14:08), 17 (07-10 @ 12:53)  Cr: 0.98 (07-11 @ 21:34), 0.91 (07-10 @ 20:37), 0.93 (07-10 @ 14:08), 0.99 (07-10 @ 12:53)  Glu: 112(07-11 @ 21:34), 121(07-10 @ 20:37), 91(07-10 @ 14:08), 101(07-10 @ 12:53)    Hgb: 13.3 (07-11 @ 21:34), 13.9 (07-10 @ 20:37), 14.7 (07-10 @ 12:53)  Hct: 41.0 (07-11 @ 21:34), 43.7 (07-10 @ 20:37), 45.9 (07-10 @ 12:53)  WBC: 12.38 (07-11 @ 21:34), 10.57 (07-10 @ 20:37), 9.41 (07-10 @ 12:53)  Plt: 209 (07-11 @ 21:34), 217 (07-10 @ 20:37), 242 (07-10 @ 12:53)    INR: 1.07 07-10-23 @ 20:37, 1.08 07-10-23 @ 12:53  PTT: 25.6 07-10-23 @ 20:37, 24.8 07-10-23 @ 12:53

## 2023-07-13 LAB
ANION GAP SERPL CALC-SCNC: 13 MMOL/L — SIGNIFICANT CHANGE UP (ref 5–17)
BUN SERPL-MCNC: 9 MG/DL — SIGNIFICANT CHANGE UP (ref 7–23)
CALCIUM SERPL-MCNC: 9.2 MG/DL — SIGNIFICANT CHANGE UP (ref 8.4–10.5)
CHLORIDE SERPL-SCNC: 106 MMOL/L — SIGNIFICANT CHANGE UP (ref 96–108)
CO2 SERPL-SCNC: 19 MMOL/L — LOW (ref 22–31)
CREAT SERPL-MCNC: 0.96 MG/DL — SIGNIFICANT CHANGE UP (ref 0.5–1.3)
EGFR: 89 ML/MIN/1.73M2 — SIGNIFICANT CHANGE UP
GLUCOSE SERPL-MCNC: 81 MG/DL — SIGNIFICANT CHANGE UP (ref 70–99)
HCT VFR BLD CALC: 42.7 % — SIGNIFICANT CHANGE UP (ref 39–50)
HGB BLD-MCNC: 13.7 G/DL — SIGNIFICANT CHANGE UP (ref 13–17)
MCHC RBC-ENTMCNC: 28.9 PG — SIGNIFICANT CHANGE UP (ref 27–34)
MCHC RBC-ENTMCNC: 32.1 GM/DL — SIGNIFICANT CHANGE UP (ref 32–36)
MCV RBC AUTO: 90.1 FL — SIGNIFICANT CHANGE UP (ref 80–100)
NRBC # BLD: 0 /100 WBCS — SIGNIFICANT CHANGE UP (ref 0–0)
PLATELET # BLD AUTO: 209 K/UL — SIGNIFICANT CHANGE UP (ref 150–400)
POTASSIUM SERPL-MCNC: 3.9 MMOL/L — SIGNIFICANT CHANGE UP (ref 3.5–5.3)
POTASSIUM SERPL-SCNC: 3.9 MMOL/L — SIGNIFICANT CHANGE UP (ref 3.5–5.3)
RBC # BLD: 4.74 M/UL — SIGNIFICANT CHANGE UP (ref 4.2–5.8)
RBC # FLD: 12.9 % — SIGNIFICANT CHANGE UP (ref 10.3–14.5)
SODIUM SERPL-SCNC: 138 MMOL/L — SIGNIFICANT CHANGE UP (ref 135–145)
WBC # BLD: 9.85 K/UL — SIGNIFICANT CHANGE UP (ref 3.8–10.5)
WBC # FLD AUTO: 9.85 K/UL — SIGNIFICANT CHANGE UP (ref 3.8–10.5)

## 2023-07-13 PROCEDURE — 99232 SBSQ HOSP IP/OBS MODERATE 35: CPT

## 2023-07-13 PROCEDURE — 99223 1ST HOSP IP/OBS HIGH 75: CPT

## 2023-07-13 PROCEDURE — 99233 SBSQ HOSP IP/OBS HIGH 50: CPT

## 2023-07-13 PROCEDURE — 70551 MRI BRAIN STEM W/O DYE: CPT | Mod: 26

## 2023-07-13 RX ADMIN — SENNA PLUS 2 TABLET(S): 8.6 TABLET ORAL at 21:08

## 2023-07-13 RX ADMIN — Medication 1 DROP(S): at 00:36

## 2023-07-13 RX ADMIN — ENOXAPARIN SODIUM 40 MILLIGRAM(S): 100 INJECTION SUBCUTANEOUS at 17:11

## 2023-07-13 RX ADMIN — ATORVASTATIN CALCIUM 80 MILLIGRAM(S): 80 TABLET, FILM COATED ORAL at 21:08

## 2023-07-13 RX ADMIN — Medication 1 DROP(S): at 17:11

## 2023-07-13 RX ADMIN — Medication 81 MILLIGRAM(S): at 11:17

## 2023-07-13 RX ADMIN — Medication 1 DROP(S): at 05:18

## 2023-07-13 RX ADMIN — Medication 1 DROP(S): at 11:17

## 2023-07-13 RX ADMIN — POLYETHYLENE GLYCOL 3350 17 GRAM(S): 17 POWDER, FOR SOLUTION ORAL at 11:16

## 2023-07-13 NOTE — PROGRESS NOTE ADULT - NS ATTEND AMEND GEN_ALL_CORE FT
I reviewed available diagnostic studies, and reviewed images personally. I agree with resident's history, exam, orders placed, and plan of care. Medical issues needing to be addressed include: cerebral infarction w/ cerebral edema in R MCA territory, hx of amyloidosis, dysarthria, L hemiparesis, dysarthria. CTA showed R distal bifurcation M1 s/p thrombectomy with TICI2a reperfusion. Cardiology consult given hx and echo findings, pt will likely need anticoagulation going forward. PT/OT/ST.

## 2023-07-13 NOTE — CONSULT NOTE ADULT - CONSULT REASON
[facility]  Certificate of Child Health Examination  Student's Name:   Ramirez España Birth Date  2010  Sex  female  Race/Ethnicity   School/Grade Level/ID#     Address:   [address] [address]  Parent/Guardian             Telephone#                                            Home:                               Work:   IMMUNIZATIONS: To be completed by health care provider. The mo/da/yr for every dose administered is required. If a specific vaccine is medically contraindicated, a separate written statement must be attached by the health care responsible for completing the health examination explaining the medical reason for the contraindication.      Immunization History   Administered Date(s) Administered   • DTaP/HIB/IPV 2011, 2011, 2011   • DTaP/IPV 03/31/2015   • Hep A, ped/adol, 2 dose 07/05/2012, 04/15/2013   • Hep B, adolescent or pediatric 2011, 2011, 2011, 01/19/2012   • MMR 04/11/2012   • Measles Mumps Rubella Varicella 03/31/2015   • Pneumococcal Conjugate 13 valent 2011, 2011, 2011   • Rotavirus - pentavalent 2011, 2011, 2011   • Varicella 04/11/2012   Pended Date(s) Pended   • Tdap 10/11/2021      Immunizations given 10/11/2021: The vaccines marked as \"pended\" above were administered today.      Health care provider (MD, DO, APN, PA, school health professional, health official) verifying above immunization history must sign below. If adding dates to the above immunization history section, put your initials by date(s) and sign here.)  Signature                                                                 Title                                                Date  _____________________________________________________________________________________  Signature                                                                 Title                                                
Date  _____________________________________________________________________________________   ALTERNATIVE PROOF OF IMMUNITY   1. Clinical diagnosis (measles, mumps, hepatitis B) is allowed when verified by physician and supported with lab confirmation.  Attach copy of lab result.    * MEASLES (Rubeola)  MO   DA  YR          **MUMPS  MO   DA  YR             HEPATITIS B  MO   DA   YR           VARICELLA  MO   DA  YR      2. History of varicella (chickenpox) disease is acceptable if verified by health care provider, school health professional or health official.  Person signing below verifies that the parent/guardian’s description of varicella disease history is indicative of past infection and is accepting such history as documentation of disease.  Date of Disease                                  Signature                                                           Title   3. Laboratory Evidence of Immunity (check one)      __ Measles*         __ Mumps**        __ Rubella        __Varicella    (Attach copy of lab result)         *All measles cases diagnosed on or after July 1, 2002, must be confirmed by laboratory evidence.      **All mumps cases diagnosed on or after July 1, 2013, must be confirmed by laboratory evidence.     Completion of Alternative 1 or 3 MUST be accompanied by Labs & Physician Signature: ___________________________  Physician Statements of Immunity MUST be submitted to [facility] for review.     Certificates of Taoism Exemption to Immunizations or Physician Medical Statements of Medical Contraindication Are Reviewed   and Maintained by the School Authority     (11/2015)                                         (COMPLETE BOTH SIDES)                                  Approved [facility] 3/2016      Student's Name:  Ramirez España Birth Date 2010 Sex female School Grade Level/ID#     Page 2 of 2   HEALTH HISTORY      TO BE COMPLETED AND SIGNED BY PARENT/GUARDIAN AND VERIFIED BY HEALTH CARE 
PROVIDER  ALLERGIES: (Food, drug, insect, other) Yes is allergic to pineapple   (food or med).   MEDICATION: (List all prescribed or taken on a regular basis.)   No   Diagnosis of asthma?  Child wakes during night coughing? Yes    No  Yes    No  Loss of function of one of paired  organs?(eye/ear/kidney/testicle) Yes    No    Birth defects? Yes    No  Hospitalizations? Yes    No    Developmental delay? Yes    No   When? What for? Yes    No    Blood disorders? Hemophilia,  Sickle Cell, Other? Explain. Yes    No  Surgery? (List all.)  When? What for? Yes    No    Diabetes? Yes    No  Serious injury or illness? Yes    No    Head injury/Concussion/Passed out? Yes    No  TB skin test positive (past/present)? * Yes    No *If yes, refer to local [facility] dept   Seizures? What are they like?  Yes    No  TB disease (past or present)? * Yes    No *[facility] yes[facility]   Heart problem/Shortness of breath?  Yes    No  Tobacco use (type, frequency)?  Yes    No    Heart murmur/High blood pressure? Yes    No  Alcohol/Drug use?  Yes    No    Dizziness or chest pain with exercise? Yes    No  Family history of sudden death  before age 50? (Cause?) Yes    No    Eye/Vision problems? ______           __Glasses          __Contacts  Last exam by eye doctor ______  Other concerns? (crossed eye, drooping lids, squinting, difficulty reading) Dental?   __ Braces     __ Bridge     __ Plate   __Other   Ear/Hearing problems? Yes    No  Information may be shared with appropriate personnel for health and educational purposes.   Bone/Joint problem/injury/scoliosis? Yes    No  Parent/Guardian  Signature                                               Date   PHYSICAL EXAMINATION REQUIREMENTS   Entire section below to be completed by MD/DO/APN/PA Head Circumference if <2-3 years old - BP 99/59 (BP Location: LUE - Left upper extremity, Patient Position: Sitting, Cuff Size: Regular)   Pulse 66   Temp 97.1 °F (36.2 °C) (Tympanic)   Resp 22   
Rehabilitation consult
Ht 5' 1.25\" (1.556 m)   Wt 36.9 kg (81 lb 5.6 oz)   LMP  (Within Months)   BMI 15.25 kg/m²   BSA 1.29 m²    17 %ile (Z= -0.95) based on CDC (Girls, 2-20 Years) BMI-for-age based on BMI available as of 10/11/2021.   DIABETES SCREENING (NOT REQUIRED FOR ) BMI>85% age/sex No     And any two of the following: Family History: No  Ethnic Minority: Yes    Signs of Insulin Resistance (hypertension, dyslipidemia, polycystic ovarian syndrome, acanthosis nigricans): No  At Risk: No   LEAD RISK QUESTIONNAIRE Required for children age 6 months through 6 years enrolled in licensed or public school operated day care, , nursery school and/or . (Blood test required if resides in [place] or high risk zip [facility].)  Questionnaire Administered? No  Blood Test Indicated? No   Blood Test Date _____   Blood Test Result ______________   TB SKIN OR BLOOD TEST Recommended only for children in high-risk groups including children immunosuppressed due to HIV infection or other conditions, frequent travel to or born in high prevalence countries or those exposed to adults in high-risk categories. See CDC guidelines. http://www.cdc.gov/tb/publications/factsheets/testing/TB_testing.htm.  Test Needed: No     Test performed: No                          Skin Test:    Date Read              /      /             Result:   Positive__      Negative__        mm________                          Blood Test: Date Reported       /      /               Result:  Positive__      Negative__      Value________  LAB TESTS (recommended) Date/Result  Date/Results   Hemoglobin or Hematocrit NA Sickle Cell (when indicated) NA   Urinalysis NA Developmental Screening Tool NA        SYSTEM REVIEW Normal  Comments/Follow-up/Needs  Normal Comments/Follow-up/Needs   Skin  Yes  Endocrine Yes    Ears Yes                  Screening Result Gastrointestinal Yes    Eyes Yes                  Screening Result: Genito-Urinary                                
             LMP   Nose Yes  Neurologic Yes    Throat Yes  Musculoskeletal Yes    Mouth/Dental Yes  Spinal Exam Yes    Cardiovascular/HTN Yes  Nutritional status Yes    Respiratory Yes Diagnosis of Asthma: No   Mental Health Yes    Currently Prescribed Asthma Medication:        No  Quick-relief medication (e.g. Short Acting Beta Antagonist)        No   Controller medication (e.g. inhaled corticosteroid) Other     NEEDS/MODIFICATIONS required in the school setting: No restrictions DIETARY Needs/Restrictions: No restrictions   SPECIAL INSTRUCTIONS/DEVICES e.g. safety glasses, glass eye, chest protector for arrhythmia, pacemaker, prosthetic device, dental bridge, false teeth, athletic support/cup: No restrictions   MENTAL HEALTH/OTHER Is there anything else the school should know about this student?  If you would like to discuss this student’s health with school or school health personnel:  Not needed   EMERGENCY ACTION needed while at school due to child’s health condition (e.g. ,seizures, asthma, insect sting, food, peanut allergy, bleeding problem, diabetes, heart problem)?   No   On the basis of the examination on this day, I approve this child’s participation in                                (If No or Modified please attach explanation.)  PHYSICAL EDUCATION:  Yes                               INTERSCHOLASTIC SPORTS   Yes      Print Name  Cleo Patel MD         Signature                                                                       Date: 10/11/2021   Address:  [employer] [facility] [address]  [employer] [facility] [address]  [facility]  [facility] [id number] [phone]         (Complete Both Sides)     Approved [facility] 3/2016  
Cardiac amyloidosis

## 2023-07-13 NOTE — CONSULT NOTE ADULT - SUBJECTIVE AND OBJECTIVE BOX
Patient seen and evaluated @ 9am on 4 [address]  Chief Complaint: CVA    HPI:  Patient John Lozano is a 62yo R handed M PMHx recent amyloidosis diagnosis presents to ED for dysarthria, L hemiparesis. Patient accompanied by daughter and wife who all state LKW 9PM 7/9/23. Patient during yesterday felt generally tired as he does from amyloidosis diagnosis but was able to ambulate to [facility], go to the beach. Then after 9PM last night, he states he might have felt a little weak in his "R leg" (but suspect L leg given exam) and slipped in the bathroom and was down for <1 hour and found by spouse. Then today AM when daughter returned from [place], she noted he was slurring with weakness for which he came to ED. Not on ac/ ap agents. Here, NIHSS 9. preMRS 0.  (10 Jul 2023 13:23)    PMH:     PSH:     Medications:   acetaminophen     Tablet .. 650 milliGRAM(s) Oral every 6 hours PRN  artificial tears (preservative free) Ophthalmic Solution 1 Drop(s) Both EYES every 6 hours  aspirin  chewable 81 milliGRAM(s) Oral daily  atorvastatin 80 milliGRAM(s) Oral at bedtime  enoxaparin Injectable 40 milliGRAM(s) SubCutaneous <User Schedule>  ondansetron Injectable 4 milliGRAM(s) IV Push once PRN  oxyCODONE    IR 5 milliGRAM(s) Oral every 4 hours PRN  oxyCODONE    IR 10 milliGRAM(s) Oral every 4 hours PRN  polyethylene glycol 3350 17 Gram(s) Oral daily  senna 2 Tablet(s) Oral at bedtime    Allergies:  No Known Allergies    FAMILY HISTORY:    Social History: , two children  Smoking:   Alcohol:  Drugs:    Review of Systems:   Constitutional: No fever, chills, fatigue, or changes in weight  HEENT: No blurry vision, eye pain, headache, runny nose, or sore throat  Respiratory: No shortness of breath, cough, or wheezing  Cardiovascular: No chest pain or palpitations  Gastrointestinal: No nausea, vomiting, diarrhea, or abdominal pain  Genitourinary: No dysuria or incontinence  Extremities: No lower extremity swelling  Neurologic: No focal findings  Lymphatic: No lymphadenopathy   Skin: No rash  Psychiatry: No anxiety or depression  10 point review of systems is otherwise negative except as mentioned above            Physical Exam:  T(C): 36.8 (07-13-23 @ 20:00), Max: 36.8 (07-13-23 @ 20:00)  HR: 80 (07-13-23 @ 20:00) (57 - 80)  BP: 104/63 (07-13-23 @ 20:00) (96/45 - 113/70)  RR: 24 (07-13-23 @ 20:00) (15 - 25)  SpO2: 96% (07-13-23 @ 20:00) (96% - 100%)  Wt(kg): --    07-12 @ 07:01  -  07-13 @ 07:00  --------------------------------------------------------  IN: 420 mL / OUT: 2725 mL / NET: -2305 mL      Daily     Daily     Appearance: Normal, well groomed, NAD  Eyes: PERRLA, EOMI, pink conjunctiva, no scleral icterus   HENT: Normal oral mucosa  Cardiovascular: RRR, S1, S2, no murmur, rub, or gallop; no edema; no JVD  Procedural Access Site: Clean, dry, intact, without hematoma  Respiratory: Clear to auscultation bilaterally  Gastrointestinal: Soft, non-tender, non-distended, BS+  Musculoskeletal: No clubbing or joint deformity   Neurologic: No focal weakness  Lymphatic: No lymphadenopathy  Psychiatry: AAOx3 with appropriate mood and affect  Skin: No rashes, ecchymoses, or cyanosis    Cardiovascular Diagnostic Testing:  ECG: sinus rhythm, low voltage    Echo:  < from: TTE W or WO Ultrasound Enhancing Agent (07.11.23 @ 06:33) >  _______________________________________________________________________________________  CONCLUSIONS:      1. Technically difficult image quality.   2. Mild left ventricular hypertrophy.   3. There is increased LV mass and concentric hypertrophy.   4. The left ventricular systolic function is moderately decreased with an ejection fraction of 36 % by Harrington's method of disks. There is global left ventricular hypokinesis. No evidence of a thrombus in the left ventricle.   5. There is severe (grade 3) left ventricular diastolic dysfunction.   6. Moderate-severely enlarged right ventricular cavity size, normal right ventricular wall thickness and reduced systolic right ventricular function.   7. Mild to moderate mitral regurgitation.   8. Trileaflet aortic valve with normal systolic excursion.   9. Trace aortic regurgitation.  10. The right atrium is severely dilated.  11. Estimated pulmonary artery systolic pressure is 41 mmHg, consistent with mild pulmonary hypertension.  12. No evidence of a patent foramen ovale by color flow Doppler.  13. Agitated saline injection was negative for intracardiac shunt.  14. No prior echocardiogram is available for comparison.    ________________________________________________________________________________________    < end of copied text >    Stress Testing:    Cath:    Interpretation of Telemetry: sinus rhythm    Imaging:    Labs:                        13.7   9.85  )-----------( 209      ( 13 Jul 2023 05:32 )             42.7     07-13    138  |  106  |  9   ----------------------------<  81  3.9   |  19<L>  |  0.96    Ca    9.2      13 Jul 2023 05:32      Thyroid Stimulating Hormone, Serum: 1.00 uIU/mL (07-10 @ 20:37)

## 2023-07-13 NOTE — PROGRESS NOTE ADULT - ASSESSMENT
ASSESSMENT:     NEURO: Continue close monitoring for neurologic deterioration, permissive HTN, titrate statin to LDL goal less than 70, MRI Brain w/o, MRA Head w/o and Neck w/contrast. Physical therapy/OT/Speech eval/treatment.     ANTITHROMBOTIC THERAPY:     PULMONARY: CXR clear, protecting airway, saturating well     CARDIOVASCULAR: TTE shows:  Mild LVH,  There is increased LV mass and concentric hypertrophy, EF 36%, global left ventricular hypokinesis. No evidence of a thrombus in the left ventricle. There is severe (grade 3) left ventricular diastolic dysfunction. Moderate-severely enlarged right ventricular cavity size, normal right ventricular wall thickness and reduced systolic right ventricular function. Mild to moderate MR, Trace AR ,  RA is severely dilated, mild pulmonary hypertension, No PFO, continue with cardiac monitoring                              SBP goal:     GASTROINTESTINAL:  dysphagia screen, passed, tolerating diet        Diet: soft and bite sized     RENAL: BUN/Cr stable, good urine output      Na Goal: Greater than 135     Lopez: No     HEMATOLOGY: H/H stable, Platelets 209     DVT ppx: Heparin s.c [] LMWH [x]     ID: afebrile, no leukocytosis     OTHER: All questions and concerns addressed with patient and family at bedside.  Considering sleep smart study.     DISPOSITION: Rehab  once stable and workup is complete.    CORE MEASURES:        Admission NIHSS: 9     TPA: [] YES [x] NO      LDL/HDL:      Depression Screen: p     Statin Therapy: y     Dysphagia Screen: [x] PASS [] FAIL     Smoking [] YES [x] NO      Afib [] YES [x] NO     Stroke Education [x] YES [] NO    Obtain screening lower extremity venous ultrasound in patients who meet 1 or more of the following criteria as patient is high risk for DVT/PE on admission:   [] History of DVT/PE  []Hypercoagulable states (Factor V Leiden, Cancer, OCP, etc. )  []Prolonged immobility (hemiplegia/hemiparesis/post operative or any other extended immobilization)  [] Transferred from outside facility (Rehab or Long term care)  [] Age </= to 50. ASSESSMENT: HPI: 64yo R handed M PMHx recent amyloidosis diagnosis presents to ED for dysarthria, L hemiparesis. Patient accompanied by daughter and wife who all state LKW 9PM 7/9/23. Patient during yesterday felt generally tired as he does from amyloidosis diagnosis but was able to ambulate to [employer], go to the beach. Then after 9PM last night, he states he might have felt a little weak in his "R leg" (but suspect L leg given exam) and slipped in the bathroom and was down for <1 hour and found by spouse. Then today AM when daughter returned from [place], she noted he was slurring with weakness for which he came to ED. Not on ac/ ap agents. Here, NIHSS 9. preMRS 0.     Impression: Dysarthria, LHH, L hemiparesis from R MCA territory ischemic stroke s/p R M1/M2 thrombectomyon 7/10. mechanism: Embolic stroke of undetermined stroke     NEURO: Neurologic examination with improvement since admission. Continue close monitoring for neurologic deterioration. Permissive HTN to gradual normotension. , continue atorvastatin 80mg qhs- titrate statin to LDL goal less than 70. MRI Brain w/o pending. Physical therapy/OT/Speech eval recommend acute rehab.     ANTITHROMBOTIC THERAPY: Aspirin 81mg daily for secondary stroke prevention. Plan to repeat CTH on Saturday 7/15 prior to starting AC    PULMONARY: CXR clear, protecting airway, saturating well     CARDIOVASCULAR: TTE shows:  Mild LVH,  There is increased LV mass and concentric hypertrophy, EF 36%, global left ventricular hypokinesis. No evidence of a thrombus in the left ventricle. There is severe (grade 3) left ventricular diastolic dysfunction. Moderate-severely enlarged right ventricular cavity size, normal right ventricular wall thickness and reduced systolic right ventricular function. Mild to moderate MR, Trace AR ,  RA is severely dilated, mild pulmonary hypertension, No PFO, continue with cardiac monitoring                              SBP goal: 100-160    GASTROINTESTINAL:  dysphagia screen passed, tolerating diet        Diet: soft and bite sized     RENAL: BUN/Cr stable, good urine output      Na Goal: Greater than 135     Lopez: No     HEMATOLOGY: H/H stable, Platelets 209     DVT ppx: Heparin s.c [] LMWH [x]     ID: afebrile, no leukocytosis     OTHER: All questions and concerns addressed with patient and family at bedside.  Considering sleep smart study.     DISPOSITION: Acute Rehab once stable and workup is complete.    CORE MEASURES:        Admission NIHSS: 9     TPA: [] YES [x] NO      LDL/HDL:      Depression Screen: p     Statin Therapy: y     Dysphagia Screen: [x] PASS [] FAIL     Smoking [] YES [x] NO      Afib [] YES [x] NO     Stroke Education [x] YES [] NO    Obtain screening lower extremity venous ultrasound in patients who meet 1 or more of the following criteria as patient is high risk for DVT/PE on admission:   [] History of DVT/PE  []Hypercoagulable states (Factor V Leiden, Cancer, OCP, etc. )  []Prolonged immobility (hemiplegia/hemiparesis/post operative or any other extended immobilization)  [] Transferred from outside facility (Rehab or Long term care)  [] Age </= to 50. ASSESSMENT: HPI: 62yo R handed M PMHx recent amyloidosis diagnosis presents to ED for dysarthria, L hemiparesis. Patient accompanied by daughter and wife who all state LKW 9PM 7/9/23. Patient during yesterday felt generally tired as he does from amyloidosis diagnosis but was able to ambulate to [employer], go to the beach. Then after 9PM last night, he states he might have felt a little weak in his "R leg" (but suspect L leg given exam) and slipped in the bathroom and was down for <1 hour and found by spouse. Then today AM when daughter returned from [place], she noted he was slurring with weakness for which he came to ED. Not on ac/ ap agents. Here, NIHSS 9. preMRS 0.     Impression: Dysarthria, LHH, L hemiparesis from R MCA territory ischemic stroke s/p R M1/M2 thrombectomyon 7/10. mechanism: Embolic stroke of undetermined stroke     NEURO: Neurologic examination with improvement since admission. Continue close monitoring for neurologic deterioration. Permissive HTN to gradual normotension. , continue atorvastatin 80mg qhs- titrate statin to LDL goal less than 70. MRI Brain w/o pending. Physical therapy/OT/Speech eval recommend acute rehab.     ANTITHROMBOTIC THERAPY: Aspirin 81mg daily for secondary stroke prevention. Plan to repeat CTH on Saturday 7/15 prior to starting AC    PULMONARY: CXR clear, protecting airway, saturating well     CARDIOVASCULAR: TTE shows:  Mild LVH,  There is increased LV mass and concentric hypertrophy, EF 36%, global left ventricular hypokinesis. No evidence of a thrombus in the left ventricle. There is severe (grade 3) left ventricular diastolic dysfunction. Moderate-severely enlarged right ventricular cavity size, normal right ventricular wall thickness and reduced systolic right ventricular function. Mild to moderate MR, Trace AR ,  RA is severely dilated, mild pulmonary hypertension, No PFO, continue with cardiac monitoring. Patients cardiologist, Dr. Romero, will see patient- recommending AC for amyloidosis with increase risk for afib.                              SBP goal: 100-160    GASTROINTESTINAL:  dysphagia screen passed, tolerating diet        Diet: soft and bite sized     RENAL: BUN/Cr stable, good urine output      Na Goal: Greater than 135     Lopez: No     HEMATOLOGY: H/H stable, Platelets 209     DVT ppx: Heparin s.c [] LMWH [x]     ID: afebrile, no leukocytosis     OTHER: All questions and concerns addressed with patient and family at bedside.  Considering sleep smart study.     DISPOSITION: Acute Rehab once stable and workup is complete.    CORE MEASURES:        Admission NIHSS: 9     TPA: [] YES [x] NO      LDL/HDL:      Depression Screen: p     Statin Therapy: y     Dysphagia Screen: [x] PASS [] FAIL     Smoking [] YES [x] NO      Afib [] YES [x] NO     Stroke Education [x] YES [] NO    Obtain screening lower extremity venous ultrasound in patients who meet 1 or more of the following criteria as patient is high risk for DVT/PE on admission:   [] History of DVT/PE  []Hypercoagulable states (Factor V Leiden, Cancer, OCP, etc. )  []Prolonged immobility (hemiplegia/hemiparesis/post operative or any other extended immobilization)  [] Transferred from outside facility (Rehab or Long term care)  [] Age </= to 50.

## 2023-07-13 NOTE — PROGRESS NOTE ADULT - SUBJECTIVE AND OBJECTIVE BOX
now on stroke unit  no new complaints   family at bedside     REVIEW OF SYSTEMS  Constitutional - No fever,  No fatigue  HEENT - No vertigo, No neck pain  Neurological - No headaches, No memory loss, No loss of strength, No numbness, No tremors  Skin - No rashes, No lesions   Musculoskeletal - No joint pain, No joint swelling, No muscle pain  Psychiatric - No depression, No anxiety    FUNCTIONAL PROGRESS  7/12 SLP  cognitive linguistic deficits  dysarthria         VITALS  T(C): 36.6 (07-13-23 @ 08:00), Max: 36.9 (07-12-23 @ 20:00)  HR: 70 (07-13-23 @ 10:00) (57 - 74)  BP: 108/74 (07-13-23 @ 10:00) (102/76 - 119/76)  RR: 15 (07-13-23 @ 10:00) (13 - 25)  SpO2: 98% (07-13-23 @ 10:00) (95% - 100%)  Wt(kg): --    MEDICATIONS   acetaminophen     Tablet .. 650 milliGRAM(s) every 6 hours PRN  artificial tears (preservative free) Ophthalmic Solution 1 Drop(s) every 6 hours  aspirin  chewable 81 milliGRAM(s) daily  atorvastatin 80 milliGRAM(s) at bedtime  enoxaparin Injectable 40 milliGRAM(s) <User Schedule>  ondansetron Injectable 4 milliGRAM(s) once PRN  oxyCODONE    IR 5 milliGRAM(s) every 4 hours PRN  oxyCODONE    IR 10 milliGRAM(s) every 4 hours PRN  polyethylene glycol 3350 17 Gram(s) daily  senna 2 Tablet(s) at bedtime      RECENT LABS - Reviewed                        13.7   9.85  )-----------( 209      ( 13 Jul 2023 05:32 )             42.7     07-13    138  |  106  |  9   ----------------------------<  81  3.9   |  19<L>  |  0.96    Ca    9.2      13 Jul 2023 05:32  Phos  3.1     07-11  Mg     1.8     07-11        Urinalysis Basic - ( 13 Jul 2023 05:32 )    Color: x / Appearance: x / SG: x / pH: x  Gluc: 81 mg/dL / Ketone: x  / Bili: x / Urobili: x   Blood: x / Protein: x / Nitrite: x   Leuk Esterase: x / RBC: x / WBC x   Sq Epi: x / Non Sq Epi: x / Bacteria: x        ----------------------------------------------------------------------------------------  PHYSICAL EXAM  Constitutional - NAD, Comfortable, sitting in chair   Chest - Breathing comfortably, room air   Cardiovascular - S1S2   Abdomen - Soft   Extremities - No C/C/E, No calf tenderness   Neurologic Exam -                    Cognitive - awake, alert, oriented x 3           +dysarthria           follows commands          moves all ext, left side drift        Psychiatric - Mood stable, Affect WNL  ----------------------------------------------------------------------------------------  ASSESSMENT/PLAN  63yMale h/o cardiac amyloid with functional deficits after CVA, with dysarthria   s/p thrombectomy  CT with Rt MCA infarct   Pain - Tylenol, oxycodone prn  DVT PPX - SCDs lovenox   Rehab - Will continue to follow for ongoing rehab needs and recommendations.   continue bedside therapy    Recommend ACUTE inpatient rehabilitation for the functional deficits consisting of 3 hours of therapy/day & 24 hour RN/daily PMR physician for comorbid medical management. Patient will be able to tolerate 3 hours a day.

## 2023-07-13 NOTE — CONSULT NOTE ADULT - ASSESSMENT
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  Status post successful thrombectomy.    Amyloidosis is a hypercoagulable state.  Stroke team suspects embolic episode - recommend starting anticoagulation.    Discharge planning per Stroke team.  Follow-up as outpatient for stabilizer and silencer therapy.

## 2023-07-13 NOTE — PROGRESS NOTE ADULT - SUBJECTIVE AND OBJECTIVE BOX
THE PATIENT WAS SEEN AND EXAMINED BY ME WITH THE HOUSESTAFF AND STROKE TEAM DURING MORNING ROUNDS.   HPI: 64yo R handed M PMHx recent amyloidosis diagnosis presents to ED for dysarthria, L hemiparesis. Patient accompanied by daughter and wife who all state LKW 9PM 7/9/23. Patient during yesterday felt generally tired as he does from amyloidosis diagnosis but was able to ambulate to [facility] [employer], go to the beach. Then after 9PM last night, he states he might have felt a little weak in his "R leg" (but suspect L leg given exam) and slipped in the bathroom and was down for <1 hour and found by spouse. Then today AM when daughter returned from [place], she noted he was slurring with weakness for which he came to ED. Not on ac/ ap agents. Here, NIHSS 9. preMRS 0.     SUBJECTIVE: "[place] No events overnight.  No new neurologic complaints.      acetaminophen     Tablet .. 650 milliGRAM(s) Oral every 6 hours PRN  artificial tears (preservative free) Ophthalmic Solution 1 Drop(s) Both EYES every 6 hours  aspirin  chewable 81 milliGRAM(s) Oral daily  atorvastatin 80 milliGRAM(s) Oral at bedtime  enoxaparin Injectable 40 milliGRAM(s) SubCutaneous <User Schedule>  ondansetron Injectable 4 milliGRAM(s) IV Push once PRN  oxyCODONE    IR 5 milliGRAM(s) Oral every 4 hours PRN  oxyCODONE    IR 10 milliGRAM(s) Oral every 4 hours PRN  polyethylene glycol 3350 17 Gram(s) Oral daily  senna 2 Tablet(s) Oral at bedtime    PHYSICAL EXAM:   Vital Signs Last 24 Hrs  T(C): 36.6 (13 Jul 2023 08:00), Max: 36.9 (12 Jul 2023 20:00)  T(F): 97.9 (13 Jul 2023 08:00), Max: 98.5 (12 Jul 2023 20:00)  HR: 70 (13 Jul 2023 10:00) (57 - 74)  BP: 108/74 (13 Jul 2023 10:00) (102/76 - 119/76)  BP(mean): 88 (13 Jul 2023 08:00) (80 - 90)  RR: 15 (13 Jul 2023 10:00) (13 - 25)  SpO2: 98% (13 Jul 2023 10:00) (95% - 100%)    Parameters below as of 13 Jul 2023 10:00  Patient On (Oxygen Delivery Method): room air    General - Quiet, somnolent, in no acute distress sitting upright in chair.   Cardiovascular - Peripheral pulses palpable, no edema    Neurologic Exam:  Mental status - Sitting in chair. Alert to self, hospital, follows simple commands  Language: Mild dysarthria, speech fluent, repetition intact.   Cranial nerves - PERRLA, left visual field deficit (LHH), mild L facial palsy, EOMI, face sensation (V1-V3) intact b/l, facial strength intact without asymmetry b/l, hearing intact b/l, palate with symmetric elevation, trapezius 5/5 strength b/l, tongue midline on protrusion with full lateral movement  Motor - Normal bulk and tone throughout.  Mild left pronator drift.  RUE, LLE, RLE no drifts    Sensation - Light touch intact throughout  Coordination - Finger to Nose intact b/l. Slower fast finger movement on L than R. No tremors appreciated  Gait: Deferred       LABS:                        13.7   9.85  )-----------( 209      ( 13 Jul 2023 05:32 )             42.7    07-13    138  |  106  |  9   ----------------------------<  81  3.9   |  19<L>  |  0.96    Ca    9.2      13 Jul 2023 05:32  Phos  3.1     07-11  Mg     1.8     07-11      IMAGING: Reviewed by me.     [facility] (7/11/23 @ 11AM): Redemonstrated acute right MCA territory infarct without hemorrhagic conversion    (7/11/23):   NONCONTRAST HEAD CT SCAN:  1.  Redemonstration of large acute infarct in the inferior division of   the right MCA vascular territory, involving posterior right temporal   lobe, right temporal parietal region, and lateral right occipital lobe.  2.  There is new patchy hypoattenuation in the subcortical/deep white   matter of the right frontal lobe and new loss of gray matter-white matter   differentiation in the right postcentral gyrus, compatible with new   developing infarct in the right middle cerebral artery vascular territory.  3.  No hemorrhagic conversion.    CT ANGIOGRAPHY NECK:  1.  Again seen is a beaded appearance of both internal carotid arteries,   suspicious for fibromuscular dysplasia.  2.  Partial anomalous pulmonary venous return is noted in the left upper   lobe.    CT ANGIOGRAPHY BRAIN:   Compared to the prior CTA on 07/10/2023, there is   flow related enhancement within the inferior division of the right MCA at   site of prior occlusion.  Slightly distal to the location of the original   occlusion, there are now severe stenoses versus focal occlusions within   two proximal M2 branches of the inferior division of the right middle   cerebral artery (see key images, 605:58-60, and 5:414-430); and there is   now a distal M2 occlusion in the inferior division of the right middle   cerebral artery (605:61-62 and 5:447-449).    (7/10/23):  CT head: Acute Rt temporal parietal infarct without hemorrhagic conversion.  CTA: Bilateral internal carotid artery fibromuscular dysplasia. Right distal M1 occlusion.  CTP: Core infarct in the right parietal temporal region of 23 mL with a penumbra of 104 mL THE PATIENT WAS SEEN AND EXAMINED BY ME WITH THE HOUSESTAFF AND STROKE TEAM DURING MORNING ROUNDS.   HPI: 62yo R handed M PMHx recent amyloidosis diagnosis presents to ED for dysarthria, L hemiparesis. Patient accompanied by daughter and wife who all state LKW 9PM 7/9/23. Patient during yesterday felt generally tired as he does from amyloidosis diagnosis but was able to ambulate to [facility], go to the beach. Then after 9PM last night, he states he might have felt a little weak in his "R leg" (but suspect L leg given exam) and slipped in the bathroom and was down for <1 hour and found by spouse. Then today AM when daughter returned from [place], she noted he was slurring with weakness for which he came to ED. Not on ac/ ap agents. Here, NIHSS 9. preMRS 0.     SUBJECTIVE: "[place] No events overnight.  No new neurologic complaints.  ROS negative unless otherwise noted.    acetaminophen     Tablet .. 650 milliGRAM(s) Oral every 6 hours PRN  artificial tears (preservative free) Ophthalmic Solution 1 Drop(s) Both EYES every 6 hours  aspirin  chewable 81 milliGRAM(s) Oral daily  atorvastatin 80 milliGRAM(s) Oral at bedtime  enoxaparin Injectable 40 milliGRAM(s) SubCutaneous <User Schedule>  ondansetron Injectable 4 milliGRAM(s) IV Push once PRN  oxyCODONE    IR 5 milliGRAM(s) Oral every 4 hours PRN  oxyCODONE    IR 10 milliGRAM(s) Oral every 4 hours PRN  polyethylene glycol 3350 17 Gram(s) Oral daily  senna 2 Tablet(s) Oral at bedtime    PHYSICAL EXAM:   Vital Signs Last 24 Hrs  T(C): 36.6 (13 Jul 2023 08:00), Max: 36.9 (12 Jul 2023 20:00)  T(F): 97.9 (13 Jul 2023 08:00), Max: 98.5 (12 Jul 2023 20:00)  HR: 70 (13 Jul 2023 10:00) (57 - 74)  BP: 108/74 (13 Jul 2023 10:00) (102/76 - 119/76)  BP(mean): 88 (13 Jul 2023 08:00) (80 - 90)  RR: 15 (13 Jul 2023 10:00) (13 - 25)  SpO2: 98% (13 Jul 2023 10:00) (95% - 100%)    Parameters below as of 13 Jul 2023 10:00  Patient On (Oxygen Delivery Method): room air    General - Quiet, somnolent, in no acute distress sitting upright in chair.   Cardiovascular - Peripheral pulses palpable, no edema    Neurologic Exam:  Mental status - Sitting in chair. Alert to self, hospital, follows simple commands  Language: Mild dysarthria, speech fluent, repetition intact.   Cranial nerves - PERRLA, left visual field deficit (LHH), mild L facial palsy, EOMI, face sensation (V1-V3) intact b/l, facial strength intact without asymmetry b/l, hearing intact b/l, palate with symmetric elevation, trapezius 5/5 strength b/l, tongue midline on protrusion with full lateral movement  Motor - Normal bulk and tone throughout.  Mild left pronator drift.  RUE, LLE, RLE no drifts    Sensation - Light touch intact throughout  Coordination - Finger to Nose intact b/l. Slower fast finger movement on L than R. No tremors appreciated  Gait: Deferred       LABS:                        13.7   9.85  )-----------( 209      ( 13 Jul 2023 05:32 )             42.7    07-13    138  |  106  |  9   ----------------------------<  81  3.9   |  19<L>  |  0.96    Ca    9.2      13 Jul 2023 05:32  Phos  3.1     07-11  Mg     1.8     07-11      IMAGING: Reviewed by me.     [facility] (7/11/23 @ 11AM): Redemonstrated acute right MCA territory infarct without hemorrhagic conversion    (7/11/23):   NONCONTRAST HEAD CT SCAN:  1.  Redemonstration of large acute infarct in the inferior division of   the right MCA vascular territory, involving posterior right temporal   lobe, right temporal parietal region, and lateral right occipital lobe.  2.  There is new patchy hypoattenuation in the subcortical/deep white   matter of the right frontal lobe and new loss of gray matter-white matter   differentiation in the right postcentral gyrus, compatible with new   developing infarct in the right middle cerebral artery vascular territory.  3.  No hemorrhagic conversion.    CT ANGIOGRAPHY NECK:  1.  Again seen is a beaded appearance of both internal carotid arteries,   suspicious for fibromuscular dysplasia.  2.  Partial anomalous pulmonary venous return is noted in the left upper   lobe.    CT ANGIOGRAPHY BRAIN:   Compared to the prior CTA on 07/10/2023, there is   flow related enhancement within the inferior division of the right MCA at   site of prior occlusion.  Slightly distal to the location of the original   occlusion, there are now severe stenoses versus focal occlusions within   two proximal M2 branches of the inferior division of the right middle   cerebral artery (see key images, 605:58-60, and 5:414-430); and there is   now a distal M2 occlusion in the inferior division of the right middle   cerebral artery (605:61-62 and 5:447-449).    (7/10/23):  CT head: Acute Rt temporal parietal infarct without hemorrhagic conversion.  CTA: Bilateral internal carotid artery fibromuscular dysplasia. Right distal M1 occlusion.  CTP: Core infarct in the right parietal temporal region of 23 mL with a penumbra of 104 mL

## 2023-07-14 ENCOUNTER — TRANSCRIPTION ENCOUNTER (OUTPATIENT)
Age: 63
End: 2023-07-14

## 2023-07-14 ENCOUNTER — INPATIENT (INPATIENT)
Facility: HOSPITAL | Age: 63
LOS: 6 days | Discharge: HOME HEALTH | DRG: 57 | End: 2023-07-21
Attending: INTERNAL MEDICINE | Admitting: INTERNAL MEDICINE
Payer: COMMERCIAL

## 2023-07-14 VITALS
DIASTOLIC BLOOD PRESSURE: 69 MMHG | RESPIRATION RATE: 16 BRPM | HEIGHT: 68 IN | TEMPERATURE: 98 F | HEART RATE: 59 BPM | OXYGEN SATURATION: 95 % | WEIGHT: 176.81 LBS | SYSTOLIC BLOOD PRESSURE: 107 MMHG

## 2023-07-14 VITALS
OXYGEN SATURATION: 98 % | HEART RATE: 65 BPM | DIASTOLIC BLOOD PRESSURE: 93 MMHG | RESPIRATION RATE: 12 BRPM | SYSTOLIC BLOOD PRESSURE: 105 MMHG

## 2023-07-14 DIAGNOSIS — Z98.890 OTHER SPECIFIED POSTPROCEDURAL STATES: Chronic | ICD-10-CM

## 2023-07-14 DIAGNOSIS — I63.9 CEREBRAL INFARCTION, UNSPECIFIED: ICD-10-CM

## 2023-07-14 LAB
ANION GAP SERPL CALC-SCNC: 10 MMOL/L — SIGNIFICANT CHANGE UP (ref 5–17)
BUN SERPL-MCNC: 15 MG/DL — SIGNIFICANT CHANGE UP (ref 7–23)
CALCIUM SERPL-MCNC: 9.2 MG/DL — SIGNIFICANT CHANGE UP (ref 8.4–10.5)
CHLORIDE SERPL-SCNC: 104 MMOL/L — SIGNIFICANT CHANGE UP (ref 96–108)
CO2 SERPL-SCNC: 22 MMOL/L — SIGNIFICANT CHANGE UP (ref 22–31)
CREAT SERPL-MCNC: 0.99 MG/DL — SIGNIFICANT CHANGE UP (ref 0.5–1.3)
EGFR: 86 ML/MIN/1.73M2 — SIGNIFICANT CHANGE UP
GLUCOSE BLDC GLUCOMTR-MCNC: 79 MG/DL — SIGNIFICANT CHANGE UP (ref 70–99)
GLUCOSE SERPL-MCNC: 83 MG/DL — SIGNIFICANT CHANGE UP (ref 70–99)
HCT VFR BLD CALC: 44.5 % — SIGNIFICANT CHANGE UP (ref 39–50)
HGB BLD-MCNC: 14.3 G/DL — SIGNIFICANT CHANGE UP (ref 13–17)
MCHC RBC-ENTMCNC: 28.7 PG — SIGNIFICANT CHANGE UP (ref 27–34)
MCHC RBC-ENTMCNC: 32.1 GM/DL — SIGNIFICANT CHANGE UP (ref 32–36)
MCV RBC AUTO: 89.2 FL — SIGNIFICANT CHANGE UP (ref 80–100)
NRBC # BLD: 0 /100 WBCS — SIGNIFICANT CHANGE UP (ref 0–0)
PLATELET # BLD AUTO: 222 K/UL — SIGNIFICANT CHANGE UP (ref 150–400)
POTASSIUM SERPL-MCNC: 4.1 MMOL/L — SIGNIFICANT CHANGE UP (ref 3.5–5.3)
POTASSIUM SERPL-SCNC: 4.1 MMOL/L — SIGNIFICANT CHANGE UP (ref 3.5–5.3)
RBC # BLD: 4.99 M/UL — SIGNIFICANT CHANGE UP (ref 4.2–5.8)
RBC # FLD: 13 % — SIGNIFICANT CHANGE UP (ref 10.3–14.5)
SODIUM SERPL-SCNC: 136 MMOL/L — SIGNIFICANT CHANGE UP (ref 135–145)
WBC # BLD: 9.08 K/UL — SIGNIFICANT CHANGE UP (ref 3.8–10.5)
WBC # FLD AUTO: 9.08 K/UL — SIGNIFICANT CHANGE UP (ref 3.8–10.5)

## 2023-07-14 PROCEDURE — 99239 HOSP IP/OBS DSCHRG MGMT >30: CPT

## 2023-07-14 PROCEDURE — 85610 PROTHROMBIN TIME: CPT

## 2023-07-14 PROCEDURE — 85730 THROMBOPLASTIN TIME PARTIAL: CPT

## 2023-07-14 PROCEDURE — 83036 HEMOGLOBIN GLYCOSYLATED A1C: CPT

## 2023-07-14 PROCEDURE — C8929: CPT

## 2023-07-14 PROCEDURE — 84295 ASSAY OF SERUM SODIUM: CPT

## 2023-07-14 PROCEDURE — 86803 HEPATITIS C AB TEST: CPT

## 2023-07-14 PROCEDURE — 71045 X-RAY EXAM CHEST 1 VIEW: CPT

## 2023-07-14 PROCEDURE — 84443 ASSAY THYROID STIM HORMONE: CPT

## 2023-07-14 PROCEDURE — 97110 THERAPEUTIC EXERCISES: CPT

## 2023-07-14 PROCEDURE — 61645 PERQ ART M-THROMBECT &/NFS: CPT

## 2023-07-14 PROCEDURE — 82803 BLOOD GASES ANY COMBINATION: CPT

## 2023-07-14 PROCEDURE — 85025 COMPLETE CBC W/AUTO DIFF WBC: CPT

## 2023-07-14 PROCEDURE — 70496 CT ANGIOGRAPHY HEAD: CPT | Mod: MA

## 2023-07-14 PROCEDURE — C1887: CPT

## 2023-07-14 PROCEDURE — 85027 COMPLETE CBC AUTOMATED: CPT

## 2023-07-14 PROCEDURE — 97116 GAIT TRAINING THERAPY: CPT

## 2023-07-14 PROCEDURE — C9399: CPT

## 2023-07-14 PROCEDURE — 84439 ASSAY OF FREE THYROXINE: CPT

## 2023-07-14 PROCEDURE — 97530 THERAPEUTIC ACTIVITIES: CPT

## 2023-07-14 PROCEDURE — 97535 SELF CARE MNGMENT TRAINING: CPT

## 2023-07-14 PROCEDURE — 97161 PT EVAL LOW COMPLEX 20 MIN: CPT

## 2023-07-14 PROCEDURE — 81003 URINALYSIS AUTO W/O SCOPE: CPT

## 2023-07-14 PROCEDURE — 82330 ASSAY OF CALCIUM: CPT

## 2023-07-14 PROCEDURE — 84100 ASSAY OF PHOSPHORUS: CPT

## 2023-07-14 PROCEDURE — 70450 CT HEAD/BRAIN W/O DYE: CPT | Mod: MA

## 2023-07-14 PROCEDURE — 84436 ASSAY OF TOTAL THYROXINE: CPT

## 2023-07-14 PROCEDURE — C1894: CPT

## 2023-07-14 PROCEDURE — 93970 EXTREMITY STUDY: CPT

## 2023-07-14 PROCEDURE — 36415 COLL VENOUS BLD VENIPUNCTURE: CPT

## 2023-07-14 PROCEDURE — 83735 ASSAY OF MAGNESIUM: CPT

## 2023-07-14 PROCEDURE — 80053 COMPREHEN METABOLIC PANEL: CPT

## 2023-07-14 PROCEDURE — 80061 LIPID PANEL: CPT

## 2023-07-14 PROCEDURE — 84484 ASSAY OF TROPONIN QUANT: CPT

## 2023-07-14 PROCEDURE — C1769: CPT

## 2023-07-14 PROCEDURE — 82947 ASSAY GLUCOSE BLOOD QUANT: CPT

## 2023-07-14 PROCEDURE — 82435 ASSAY OF BLOOD CHLORIDE: CPT

## 2023-07-14 PROCEDURE — 82962 GLUCOSE BLOOD TEST: CPT

## 2023-07-14 PROCEDURE — 84132 ASSAY OF SERUM POTASSIUM: CPT

## 2023-07-14 PROCEDURE — 97167 OT EVAL HIGH COMPLEX 60 MIN: CPT

## 2023-07-14 PROCEDURE — 70551 MRI BRAIN STEM W/O DYE: CPT

## 2023-07-14 PROCEDURE — C1757: CPT

## 2023-07-14 PROCEDURE — 92523 SPEECH SOUND LANG COMPREHEN: CPT

## 2023-07-14 PROCEDURE — 84480 ASSAY TRIIODOTHYRONINE (T3): CPT

## 2023-07-14 PROCEDURE — 70498 CT ANGIOGRAPHY NECK: CPT | Mod: MA

## 2023-07-14 PROCEDURE — 85018 HEMOGLOBIN: CPT

## 2023-07-14 PROCEDURE — 85014 HEMATOCRIT: CPT

## 2023-07-14 PROCEDURE — 80048 BASIC METABOLIC PNL TOTAL CA: CPT

## 2023-07-14 PROCEDURE — C1760: CPT

## 2023-07-14 PROCEDURE — 0042T: CPT | Mod: MA

## 2023-07-14 PROCEDURE — 99285 EMERGENCY DEPT VISIT HI MDM: CPT

## 2023-07-14 PROCEDURE — 83605 ASSAY OF LACTIC ACID: CPT

## 2023-07-14 RX ORDER — SENNA PLUS 8.6 MG/1
2 TABLET ORAL
Qty: 0 | Refills: 0 | DISCHARGE
Start: 2023-07-14

## 2023-07-14 RX ORDER — ASPIRIN/CALCIUM CARB/MAGNESIUM 324 MG
81 TABLET ORAL DAILY
Refills: 0 | Status: DISCONTINUED | OUTPATIENT
Start: 2023-07-14 | End: 2023-07-21

## 2023-07-14 RX ORDER — LANOLIN ALCOHOL/MO/W.PET/CERES
3 CREAM (GRAM) TOPICAL AT BEDTIME
Refills: 0 | Status: DISCONTINUED | OUTPATIENT
Start: 2023-07-14 | End: 2023-07-21

## 2023-07-14 RX ORDER — ENOXAPARIN SODIUM 100 MG/ML
40 INJECTION SUBCUTANEOUS
Refills: 0 | Status: DISCONTINUED | OUTPATIENT
Start: 2023-07-14 | End: 2023-07-18

## 2023-07-14 RX ORDER — ACETAMINOPHEN 500 MG
650 TABLET ORAL EVERY 6 HOURS
Refills: 0 | Status: DISCONTINUED | OUTPATIENT
Start: 2023-07-14 | End: 2023-07-21

## 2023-07-14 RX ORDER — POLYETHYLENE GLYCOL 3350 17 G/17G
17 POWDER, FOR SOLUTION ORAL
Qty: 0 | Refills: 0 | DISCHARGE
Start: 2023-07-14

## 2023-07-14 RX ORDER — ACETAMINOPHEN 500 MG
2 TABLET ORAL
Qty: 0 | Refills: 0 | DISCHARGE
Start: 2023-07-14

## 2023-07-14 RX ORDER — OXYCODONE HYDROCHLORIDE 5 MG/1
10 TABLET ORAL EVERY 4 HOURS
Refills: 0 | Status: DISCONTINUED | OUTPATIENT
Start: 2023-07-14 | End: 2023-07-18

## 2023-07-14 RX ORDER — DIPHENHYDRAMINE HYDROCHLORIDE AND LIDOCAINE HYDROCHLORIDE AND ALUMINUM HYDROXIDE AND MAGNESIUM HYDRO
15 KIT
Refills: 0 | Status: DISCONTINUED | OUTPATIENT
Start: 2023-07-14 | End: 2023-07-21

## 2023-07-14 RX ORDER — POLYETHYLENE GLYCOL 3350 17 G/17G
17 POWDER, FOR SOLUTION ORAL DAILY
Refills: 0 | Status: DISCONTINUED | OUTPATIENT
Start: 2023-07-14 | End: 2023-07-21

## 2023-07-14 RX ORDER — ATORVASTATIN CALCIUM 80 MG/1
80 TABLET, FILM COATED ORAL AT BEDTIME
Refills: 0 | Status: DISCONTINUED | OUTPATIENT
Start: 2023-07-14 | End: 2023-07-21

## 2023-07-14 RX ORDER — OXYCODONE HYDROCHLORIDE 5 MG/1
5 TABLET ORAL EVERY 4 HOURS
Refills: 0 | Status: DISCONTINUED | OUTPATIENT
Start: 2023-07-14 | End: 2023-07-18

## 2023-07-14 RX ORDER — ATORVASTATIN CALCIUM 80 MG/1
1 TABLET, FILM COATED ORAL
Qty: 0 | Refills: 0 | DISCHARGE
Start: 2023-07-14

## 2023-07-14 RX ORDER — SENNA PLUS 8.6 MG/1
2 TABLET ORAL AT BEDTIME
Refills: 0 | Status: DISCONTINUED | OUTPATIENT
Start: 2023-07-14 | End: 2023-07-21

## 2023-07-14 RX ORDER — ASPIRIN/CALCIUM CARB/MAGNESIUM 324 MG
1 TABLET ORAL
Qty: 0 | Refills: 0 | DISCHARGE
Start: 2023-07-14

## 2023-07-14 RX ADMIN — ATORVASTATIN CALCIUM 80 MILLIGRAM(S): 80 TABLET, FILM COATED ORAL at 22:34

## 2023-07-14 RX ADMIN — DIPHENHYDRAMINE HYDROCHLORIDE AND LIDOCAINE HYDROCHLORIDE AND ALUMINUM HYDROXIDE AND MAGNESIUM HYDRO 15 MILLILITER(S): KIT at 18:48

## 2023-07-14 RX ADMIN — ENOXAPARIN SODIUM 40 MILLIGRAM(S): 100 INJECTION SUBCUTANEOUS at 18:51

## 2023-07-14 RX ADMIN — Medication 1 DROP(S): at 11:28

## 2023-07-14 RX ADMIN — Medication 81 MILLIGRAM(S): at 11:26

## 2023-07-14 RX ADMIN — Medication 1 DROP(S): at 18:48

## 2023-07-14 RX ADMIN — POLYETHYLENE GLYCOL 3350 17 GRAM(S): 17 POWDER, FOR SOLUTION ORAL at 11:28

## 2023-07-14 RX ADMIN — Medication 1 DROP(S): at 00:14

## 2023-07-14 RX ADMIN — Medication 1 DROP(S): at 23:00

## 2023-07-14 NOTE — H&P ADULT - ATTENDING COMMENTS
Seen and examined, findings as noted, note revised  Seen and examined, daughter and bed side and provided collateral hx    62y/o Rt handed Male, community-dwelling and independent with ADLs, no device us.Acute rehab admission 7/14/23 with Rt MCA ischemic strroke    Hx t amyloidosis, Admitted to  [facility] on 7/10  with dysarthria and L hemiparesis. Dxed with  R MCA territory infarct without hemorrhagic conversion.     Currently ambulating minimal distance 10ft with RW CGA    Reports no acute pain symptoms  voiding appropriately  Had normal BM yesterday    Drowzy, rousable and slightly irritable  once awake, he is appropriately responsive, but preferring to go hack to sleep shortly afterwards  Limited engagement during review  Clear chest   Abd soft non tedner   Ext--no edema or rash  Mild tendness left calf  (LE duplex 7/11-no DVT)    MMT as noted  Left arm and leg reduced motor strenght 3-3+/5  Declined to engage fo assess pronator drift  Reflexes  1+ Bicepts and Patella     RECENT LABS/IMAGING                        14.7   8.03  )-----------( 227      ( 15 Jul 2023 06:23 )             45.2     07-15    136  |  102  |  15  ----------------------------<  83  3.7   |  24  |  0.92    Ca    9.1      15 Jul 2023 06:23    TPro  7.1  /  Alb  2.9<L>  /  TBili  1.6<H>  /  DBili  x   /  AST  46<H>  /  ALT  58<H>  /  AlkPhos  122<H>  07-15    Urinalysis Basic - ( 15 Jul 2023 06:23 )    Color: x / Appearance: x / SG: x / pH: x  Gluc: 83 mg/dL / Ketone: x  / Bili: x / Urobili: x   Blood: x / Protein: x / Nitrite: x   Leuk Esterase: x / RBC: x / WBC x   Sq Epi: x / Non Sq Epi: x / Bacteria: x    Dx  R MCA CVA  Commence therapy  Continue DVT ppx and f/u with recommendation for CTH on 7/17 to access candidacy to full AC, considering background coagulopathy due to amyloidosis  Contnue bowel regimen/GI protection  Moniror mood,    Est dc 10-14 days Seen and examined, findings as noted, note revised  Seen and examined, daughter and bed side and provided collateral hx    64y/o Rt handed Male, community-dwelling and independent with ADLs, no device us.Acute rehab admission 7/14/23 with Rt MCA ischemic strroke    Hx t amyloidosis, Admitted to  [facility] on 7/10  with dysarthria and L hemiparesis. Dxed with  R MCA territory infarct without hemorrhagic conversion.     Currently ambulating minimal distance 10ft with RW CGA    Reports no acute pain symptoms  voiding appropriately  Had normal BM yesterday    Drowzy, rousable and slightly irritable  once awake, he is appropriately responsive, but preferring to go hack to sleep shortly afterwards  Limited engagement during review  Clear chest   Abd soft non tedner   Ext--no edema or rash  Mild tendness left calf  (LE duplex 7/11-no DVT)    MMT as noted  Left arm and leg reduced motor strenght 3-3+/5  Declined to engage fo assess pronator drift  Reflexes  1+ Bicepts and Patella     RECENT LABS/IMAGING                        14.7   8.03  )-----------( 227      ( 15 Jul 2023 06:23 )             45.2     07-15    136  |  102  |  15  ----------------------------<  83  3.7   |  24  |  0.92    Ca    9.1      15 Jul 2023 06:23    TPro  7.1  /  Alb  2.9<L>  /  TBili  1.6<H>  /  DBili  x   /  AST  46<H>  /  ALT  58<H>  /  AlkPhos  122<H>  07-15    Urinalysis Basic - ( 15 Jul 2023 06:23 )    Color: x / Appearance: x / SG: x / pH: x  Gluc: 83 mg/dL / Ketone: x  / Bili: x / Urobili: x   Blood: x / Protein: x / Nitrite: x   Leuk Esterase: x / RBC: x / WBC x   Sq Epi: x / Non Sq Epi: x / Bacteria: x    Dx  R MCA CVA with left Hemiparesis   Commence therapy  Continue DVT ppx and f/u with recommendation for CTH on 7/17 to access candidacy to full AC, considering background coagulopathy due to amyloidosis  Contnue bowel regimen/GI protection  Mariya hernández,    Est dc 10-14 days

## 2023-07-14 NOTE — H&P ADULT - NSHPPHYSICALEXAM_GEN_ALL_CORE
PHYSICAL EXAM  VITALS  T(C): 36.5 (07-14-23 @ 08:00), Max: 36.8 (07-13-23 @ 20:00)  HR: 65 (07-14-23 @ 14:00) (57 - 80)  BP: 105/93 (07-14-23 @ 14:00) (92/64 - 115/71)  RR: 12 (07-14-23 @ 14:00) (12 - 27)  SpO2: 98% (07-14-23 @ 14:00) (91% - 100%)    Gen - NAD, Comfortable  HEENT - NCAT, EOMI, MMM  Neck - Supple, No limited ROM  Pulm - CTAB, No wheeze, No rhonchi, No crackles  Cardiovascular - RRR, S1S2  Chest - good chest expansion, good respiratory effort  Abdomen - Soft, NT/ND, +BS  Extremities - No Cyanosis, no clubbing, no edema, no calf tenderness  Neuro-     Cognitive - awake, alert, oriented to person, place, date, year, and situation. Able  to follow command     Communication - + dysarthria, hypophonia     Attention: Intact, able to state days of week chronologically and backwards. Able to perform simple additions and subtractions     Judgement: Good evidence of judgement     Memory: Recall 3 objects immediate and 3 min later     Cranial Nerves -  left visual field deficit , mild L facial palsy      Motor -                     LEFT    UE - ShAB 5/5, EF 5/5, EE 5/5,  5/5                    RIGHT UE - ShAB 5/5, EF 5/5, EE 5/5,   5/5                    LEFT    LE - HF 5/5, KE 5/5, DF 5/5, PF 5/5                    RIGHT LE - HF 5/5, KE 5/5, DF 5/5, PF 5/5        Sensory - Intact  to LT on right/left side or bilaterally     Reflexes - DTR Intact, No primitive reflexive     Coordination - FTN intact but slower to left hand     Tone - normal  MSK: Decreased ROM to right shoulder 2/2 osteoarthritis, mild left pronator drift.  Psychiatric - Mood stable, Affect WNL  Skin:  all skin intact PHYSICAL EXAM  VITALS  T(C): 36.5 (07-14-23 @ 08:00), Max: 36.8 (07-13-23 @ 20:00)  HR: 65 (07-14-23 @ 14:00) (57 - 80)  BP: 105/93 (07-14-23 @ 14:00) (92/64 - 115/71)  RR: 12 (07-14-23 @ 14:00) (12 - 27)  SpO2: 98% (07-14-23 @ 14:00) (91% - 100%)    Gen - NAD, Comfortable  HEENT - NCAT, EOMI, MMM  Neck - Supple, No limited ROM  Pulm - CTAB, No wheeze, No rhonchi, No crackles  Cardiovascular - RRR, S1S2  Chest - good chest expansion, good respiratory effort  Abdomen - Soft, NT/ND, +BS  Extremities - No Cyanosis, no clubbing, no edema, no calf tenderness  Neuro-     Cognitive - awake, alert, oriented to person, place, date, year, and situation. Able  to follow command     Communication - + dysarthria, hypophonia     Attention: Intact, able to state days of week chronologically and backwards. Able to perform simple additions and subtractions     Judgement: Good evidence of judgement     Memory: Recall 3 objects immediate and 3 min later     Cranial Nerves -  left visual field deficit , mild L facial palsy      Motor -                     LEFT    UE - ShAB 3/5                    RIGHT UE - ShAB 5/5, EF 5/5, EE 5/5,   5/5                    LEFT    LE - 3/5                    RIGHT LE - HF 5/5, KE 5/5, DF 5/5, PF 5/5        Sensory - Intact  to LT on right/left side or bilaterally     Reflexes - DTR Intact, No primitive reflexive     Coordination - FTN intact but slower to left hand     Tone - normal  MSK: Decreased ROM to right shoulder 2/2 osteoarthritis, mild left pronator drift.  Psychiatric - Mood stable, Affect WNL  Skin:  all skin intact

## 2023-07-14 NOTE — H&P ADULT - ASSESSMENT
Assessment/Plan:  WILFRIDO IRVIN is a 63y with ****.  Hospital Course complicated by ***. Patient now admitted for a multidisciplinary rehab program. 07-14-23 @ 13:49        Comprehensive Multidisciplinary Rehab Program:  - Start comprehensive rehab program of PT/OT/SLP - 3 hours a day, 5 days a week. P&O as needed       HTN  -   - Monitor BP    HLD  - cont statin    T2DM  - Lantus  - Premeal  - SSI  - Monitor fingersticks    Pain  - Tylenol PRN  - Avoid sedating medications that may interfere with cognitive recovery    Mood / Cognition  - Neuropsychology consult  - [ ] Enhanced Supervision  [ ] Constant Observation    Sleep  - Maintain quiet hours and a low stim environment.   - Monitor sleep logs (for BIU)  - Melatonin PRN     GI / Bowel  - Senna qHS  - Miralax PRN Daily  - GI ppx: ***     / Bladder  - Currently patient voids:      [ ] independent      [ ] external collection device (condom cath)      [ ] Indwelling drake catheter      [ ] Intermittent catheterization  - Continue bladder scans Q8 hours with straight cath for >400cc.  - Toileting schedule every 4 hours    Skin / Pressure injury  - Skin assessment on admission performed [  ] :   - Monitor Incisions:    - Turn q2 hours in bed while awake, air mattress  - nursing to monitor skin qShift  - soft heel protectors  - skin barrier cream PRN    Diet/Dysphagia:  - Diet Consistency: ***  - Supplements: ***  - Aspiration Precautions  - SLP consult for swallow function evaluation and treatment  - Nutrition consult    DVT prophylaxis:   - *****  - SCDs  - Last Doppler on ***       Outpatient Follow-up:  ** Specialty and Name of physician      Code Status/Emergency Contact:      ---------------    Goals: Safe discharge to home  Estimated Length of Stay: 10-14 days  Rehab Potential: Good  Medical Prognosis: Good  Estimated Disposition: Home with home care      PRESCREEN COMPARISON:  I have reviewed the prescreen information and I have found no relevant changes between the preadmission screening and my post admission evaluation.    RATIONALE FOR INPATIENT ADMISSION: Patient demonstrates the following:  [X] Medically appropriate for rehabilitation admission  [X] Has attainable rehab goals with an appropriate initial discharge plan  [X]Has rehabilitation potential (expected to make a significant improvement within a reasonable period of time)  [X] Requires close medical management by a rehab physician, rehab nursing care, Hospitalist and comprehensive interdisciplinary team (including PT, OT and/or SLP, Prosthetics and Orthotics)     Assessment/Plan:  WILFRIDO IRVIN is a 63y PMHx recent amyloidosis diagnosis presented to [facility] on 7/10 for dysarthria and L hemiparesis. Patient found to have a R MCA territory infarct without hemorrhagic conversion. Patient now admitted for a multidisciplinary rehab program. 07-14-23 @ 13:49      R MCA CVA  - Continue _____  - Patient recommended to start full dose AC given hypercoagulable state 2/2 amyloidosis. Recommend CTH on 7/17 to access candidacy to start AC.  - Precautions: Aspiration, fall  - Start comprehensive rehab program of PT/OT/SLP - 3 hours a day, 5 days a week. P&O as needed     Amylodosis  -    HTN  -   - Monitor BP    HLD  - cont statin    T2DM  - Lantus  - Premeal  - SSI  - Monitor fingersticks    Pain  - Tylenol PRN  - Avoid sedating medications that may interfere with cognitive recovery    Mood / Cognition  - Neuropsychology consult  - [ ] Enhanced Supervision  [ ] Constant Observation    Sleep  - Maintain quiet hours and a low stim environment.   - Monitor sleep logs (for BIU)  - Melatonin PRN     GI / Bowel  - Senna qHS  - Miralax PRN Daily  - GI ppx: ***     / Bladder  - Currently patient voids:      [ ] independent      [ ] external collection device (condom cath)      [ ] Indwelling drake catheter      [ ] Intermittent catheterization  - Continue bladder scans Q8 hours with straight cath for >400cc.  - Toileting schedule every 4 hours    Skin / Pressure injury  - Skin assessment on admission performed [  ] :   - Monitor Incisions:    - Turn q2 hours in bed while awake, air mattress  - nursing to monitor skin qShift  - soft heel protectors  - skin barrier cream PRN    Diet/Dysphagia:  - Diet Consistency: ***  - Supplements: ***  - Aspiration Precautions  - SLP consult for swallow function evaluation and treatment  - Nutrition consult    DVT prophylaxis:   - *****  - SCDs  - Last Doppler on ***       Outpatient Follow-up:  ** Specialty and Name of physician      Code Status/Emergency Contact:      ---------------    Goals: Safe discharge to home  Estimated Length of Stay: 10-14 days  Rehab Potential: Good  Medical Prognosis: Good  Estimated Disposition: Home with home care      PRESCREEN COMPARISON:  I have reviewed the prescreen information and I have found no relevant changes between the preadmission screening and my post admission evaluation.    RATIONALE FOR INPATIENT ADMISSION: Patient demonstrates the following:  [X] Medically appropriate for rehabilitation admission  [X] Has attainable rehab goals with an appropriate initial discharge plan  [X]Has rehabilitation potential (expected to make a significant improvement within a reasonable period of time)  [X] Requires close medical management by a rehab physician, rehab nursing care, Hospitalist and comprehensive interdisciplinary team (including PT, OT and/or SLP, Prosthetics and Orthotics)     Assessment/Plan:  WILFRIDO IRVIN is a 63y PMHx recent amyloidosis diagnosis presented to [facility] on 7/10 for dysarthria and L hemiparesis. Patient found to have a R MCA territory infarct without hemorrhagic conversion. Patient now admitted for a multidisciplinary rehab program. 07-14-23 @ 13:49      R MCA CVA  - ASA 81mg qd  - Lipitor 80mg qd  - Patient recommended to start full dose AC given hypercoagulable state 2/2 amyloidosis. Recommend CTH on 7/17 to access candidacy to start AC.  - Precautions: Aspiration, fall  - Start comprehensive rehab program of PT/OT/SLP - 3 hours a day, 5 days a week. P&O as needed     Dysphagia  - Soft and bite sized diet with thins  - SLP consult  - Bedside swallow eval    Amylodosis  - Recently dx  - Patient recommended to start full dose AC given hypercoagulable state 2/2 amyloidosis. Recommend CTH on 7/17 to access candidacy to start AC.    HLD  - cont statin      Pain  - Tylenol/oxycodone PRN    Sleep  - Melatonin PRN     GI / Bowel  - Senna qHS  - Miralax PRN Daily     / Bladder  - Currently patient voids independently. Check PVRs on admission. SC for > 400ccs    Skin / Pressure injury  - Skin assessment on admission performed [  ] :   - Monitor Incisions:    - Turn q2 hours in bed while awake, air mattress  - nursing to monitor skin qShift  - soft heel protectors  - skin barrier cream PRN    Diet/Dysphagia:  - Diet Consistency: Soft and bite sized  - Aspiration Precautions  - SLP consult for swallow function evaluation and treatment  - Nutrition consult    DVT prophylaxis:   - Lovenox      Outpatient Follow-up:    Sarah Damian  NP in Family Health  [address], [address]  [place] [address]  Phone: [phone]  Fax: [phone]  Follow Up Time: 2 weeks    Ayush Romero  Cardiology  [address], [address]  [place] [address]  Phone: [phone]  Fax: [phone]  Follow Up Time: 2 weeks      ---------------    Goals: Safe discharge to home  Estimated Length of Stay: 10-14 days  Rehab Potential: Good  Medical Prognosis: Good  Estimated Disposition: Home with home care      PRESCREEN COMPARISON:  I have reviewed the prescreen information and I have found no relevant changes between the preadmission screening and my post admission evaluation.    RATIONALE FOR INPATIENT ADMISSION: Patient demonstrates the following:  [X] Medically appropriate for rehabilitation admission  [X] Has attainable rehab goals with an appropriate initial discharge plan  [X]Has rehabilitation potential (expected to make a significant improvement within a reasonable period of time)  [X] Requires close medical management by a rehab physician, rehab nursing care, Hospitalist and comprehensive interdisciplinary team (including PT, OT and/or SLP, Prosthetics and Orthotics)     Assessment/Plan:  WILFRIDO IRVIN is a 63y PMHx recent amyloidosis diagnosis presented to [facility] on 7/10 for dysarthria and L hemiparesis. Patient found to have a R MCA territory infarct without hemorrhagic conversion. Patient now admitted for a multidisciplinary rehab program. 07-14-23 @ 13:49    R MCA CVA  - ASA 81mg qd  - Lipitor 80mg qd  - Patient recommended to start full dose AC given hypercoagulable state 2/2 amyloidosis. Recommend CTH on 7/17 to access candidacy to start AC.  - Precautions: Aspiration, fall  - Start comprehensive rehab program of PT/OT/SLP - 3 hours a day, 5 days a week. P&O as needed     Dysphagia  - Soft and bite sized diet with thins  - SLP consult  - Bedside swallow eval    Amylodosis  - Recently dx  - Patient recommended to start full dose AC given hypercoagulable state 2/2 amyloidosis. Recommend CTH on 7/17 to access candidacy to start AC.    HLD  - cont statin    Pain  - Tylenol/oxycodone PRN    Sleep  - Melatonin PRN     GI / Bowel  - Senna qHS  - Miralax PRN Daily     / Bladder  - Currently patient voids independently. Check PVRs on admission. SC for > 400ccs    Skin / Pressure injury  - Skin assessment on admission performed : Intact  - Turn q2 hours in bed while awake, air mattress  - nursing to monitor skin q Shift  - soft heel protectors  - skin barrier cream PRN    Diet/Dysphagia:  - Diet Consistency: Soft and bite sized  - Aspiration Precautions  - SLP consult for swallow function evaluation and treatment  - Nutrition consult    DVT prophylaxis:   - Lovenox    Outpatient Follow-up:    Sarah Damian  NP in [facility]  [address], [address]  [place] [address]  Phone: [phone]  Fax: [phone]  Follow Up Time: 2 weeks    Ayush Romero  Cardiology  [address], [address]  [place] [address]  Phone: [phone]  Fax: [phone]  Follow Up Time: 2 weeks    ---------------    Goals: Safe discharge to home  Estimated Length of Stay: 10-14 days  Rehab Potential: Good  Medical Prognosis: Good  Estimated Disposition: Home with home care      PRESCREEN COMPARISON:  I have reviewed the prescreen information and I have found no relevant changes between the preadmission screening and my post admission evaluation.    RATIONALE FOR INPATIENT ADMISSION: Patient demonstrates the following:  [X] Medically appropriate for rehabilitation admission  [X] Has attainable rehab goals with an appropriate initial discharge plan  [X]Has rehabilitation potential (expected to make a significant improvement within a reasonable period of time)  [X] Requires close medical management by a rehab physician, rehab nursing care, Hospitalist and comprehensive interdisciplinary team (including PT, OT and/or SLP, Prosthetics and Orthotics)     Assessment/Plan:  WILFRIDO IRVIN is a 63y PMHx recent amyloidosis diagnosis presented to [facility] on 7/10 for dysarthria and L hemiparesis. Patient found to have a R MCA territory infarct without hemorrhagic conversion. Patient now admitted for a multidisciplinary rehab program. 07-14-23 @ 13:49    * CTH on 7/17 to access candidacy to full AC.  * Monitor mood and get neuropsych consult if decreased engagement and low mood persisting     R MCA CVA  - ASA 81mg qd  - Lipitor 80mg qd  - Patient recommended to start full dose AC given hypercoagulable state 2/2 amyloidosis. Recommend CT on 7/17 to access candidacy to start AC.  - Precautions: Aspiration, fall  - Start comprehensive rehab program of PT/OT/SLP - 3 hours a day, 5 days a week. P&O as needed     Dysphagia  - Soft and bite sized diet with thins  - SLP consult  - Bedside swallow eval    Amylodosis  - Recently dx  - Patient recommended to start full dose AC given hypercoagulable state 2/2 amyloidosis. Recommend CT on 7/17 to access candidacy to start AC.    HLD  - cont statin    Pain  - Tylenol/oxycodone PRN    Sleep  - Melatonin PRN     GI / Bowel  - Senna qHS  - Miralax PRN Daily     / Bladder--voiding appropriately     Skin / Pressure injury  - Skin assessment on admission performed : Intact  - Turn q2 hours in bed while awake, air mattress  - nursing to monitor skin q Shift  - soft heel protectors  - skin barrier cream PRN    Diet/Dysphagia:  - Diet Consistency: Soft and bite sized  - Aspiration Precautions  - SLP consult for swallow function evaluation and treatment  - Nutrition consult    DVT prophylaxis:   - Lovenox    Outpatient Follow-up:    Sarah Damian  NP in Family Health  [address], [address]  [place] [address]  Phone: [phone]  Fax: [phone]  Follow Up Time: 2 weeks    Ayush Romero  Cardiology  [address], [address]  [place] [address]  Phone: [phone]  Fax: [phone]  Follow Up Time: 2 weeks    ---------------    Goals: Safe discharge to home  Estimated Length of Stay: 10-14 days  Rehab Potential: Good  Medical Prognosis: Good  Estimated Disposition: Home with home care      PRESCREEN COMPARISON:  I have reviewed the prescreen information and I have found no relevant changes between the preadmission screening and my post admission evaluation.    RATIONALE FOR INPATIENT ADMISSION: Patient demonstrates the following:  [X] Medically appropriate for rehabilitation admission  [X] Has attainable rehab goals with an appropriate initial discharge plan  [X]Has rehabilitation potential (expected to make a significant improvement within a reasonable period of time)  [X] Requires close medical management by a rehab physician, rehab nursing care, Hospitalist and comprehensive interdisciplinary team (including PT, OT and/or SLP, Prosthetics and Orthotics)     Assessment/Plan:  WILFRIDO IRVIN is a 63y PMHx recent amyloidosis diagnosis presented to [facility] on 7/10 for dysarthria and L hemiparesis. Patient found to have a R MCA territory infarct without hemorrhagic conversion. Patient now admitted for a multidisciplinary rehab program. 07-14-23 @ 13:49    * CTH on 7/17 to access candidacy to full AC.  * Monitor mood and get neuropsych consult if decreased engagement and low mood persisting     R MCA CVA with left Hemiparesis   - ASA 81mg qd  - Lipitor 80mg qd  - Patient recommended to start full dose AC given hypercoagulable state 2/2 amyloidosis. Recommend CT on 7/17 to access candidacy to start AC.  - Precautions: Aspiration, fall  - Start comprehensive rehab program of PT/OT/SLP - 3 hours a day, 5 days a week. P&O as needed     Dysphagia  - Soft and bite sized diet with thins  - SLP consult  - Bedside swallow eval    Amylodosis  - Recently dx  - Patient recommended to start full dose AC given hypercoagulable state 2/2 amyloidosis. Recommend CT on 7/17 to access candidacy to start AC.    HLD  - cont statin    Pain  - Tylenol/oxycodone PRN    Sleep  - Melatonin PRN     GI / Bowel  - Senna qHS  - Miralax PRN Daily     / Bladder--voiding appropriately     Skin / Pressure injury  - Skin assessment on admission performed : Intact  - Turn q2 hours in bed while awake, air mattress  - nursing to monitor skin q Shift  - soft heel protectors  - skin barrier cream PRN    Diet/Dysphagia:  - Diet Consistency: Soft and bite sized  - Aspiration Precautions  - SLP consult for swallow function evaluation and treatment  - Nutrition consult    DVT prophylaxis:   - Lovenox    Outpatient Follow-up:    Sarah Damian  NP in [facility]  [address], [address]  [place] [address]  Phone: [phone]  Fax: [phone]  Follow Up Time: 2 weeks    Ayush Romero  Cardiology  [address]  [place] [address]  Phone: [phone]  Fax: [phone]  Follow Up Time: 2 weeks    ---------------    Goals: Safe discharge to home  Estimated Length of Stay: 10-14 days  Rehab Potential: Good  Medical Prognosis: Good  Estimated Disposition: Home with home care      PRESCREEN COMPARISON:  I have reviewed the prescreen information and I have found no relevant changes between the preadmission screening and my post admission evaluation.    RATIONALE FOR INPATIENT ADMISSION: Patient demonstrates the following:  [X] Medically appropriate for rehabilitation admission  [X] Has attainable rehab goals with an appropriate initial discharge plan  [X]Has rehabilitation potential (expected to make a significant improvement within a reasonable period of time)  [X] Requires close medical management by a rehab physician, rehab nursing care, Hospitalist and comprehensive interdisciplinary team (including PT, OT and/or SLP, Prosthetics and Orthotics)

## 2023-07-14 NOTE — DISCHARGE NOTE PROVIDER - HOSPITAL COURSE
64yo R handed M PMHx recent amyloidosis diagnosis presents to ED for dysarthria, L hemiparesis. Patient accompanied by daughter and wife who all state LKW 9PM 7/9/23. Patient during yesterday felt generally tired as he does from amyloidosis diagnosis but was able to ambulate to [facility], go to the beach. Then after 9PM last night, he states he might have felt a little weak in his "R leg" (but suspect L leg given exam) and slipped in the bathroom and was down for <1 hour and found by spouse. Then today AM when daughter returned from [place], she noted he was slurring with weakness for which he came to ED. Not on ac/ ap agents. Here, NIHSS 9. preMRS 0.     Impression: Dysarthria, LHH, L hemiparesis from R MCA territory ischemic stroke s/p R M1/M2 thrombectomyon 7/10. Mechanism: cardioembolic i/s/o amyloidosis and risk of afib vs cardiac amyloidosis      - high intensity statin    MR Head No Cont (07.13.2023)   Evolving acute right MCA territorial infarct.    CTH (7/11/23 @ 11AM): Redemonstrated acute right MCA territory infarct without hemorrhagic conversion    (7/11/23):   NONCONTRAST HEAD CT SCAN:  1.  Redemonstration of large acute infarct in the inferior division of   the right MCA vascular territory, involving posterior right temporal   lobe, right temporal parietal region, and lateral right occipital lobe.  2.  There is new patchy hypoattenuation in the subcortical/deep white   matter of the right frontal lobe and new loss of gray matter-white matter   differentiation in the right postcentral gyrus, compatible with new   developing infarct in the right middle cerebral artery vascular territory.  3.  No hemorrhagic conversion.    CT ANGIOGRAPHY NECK:  1.  Again seen is a beaded appearance of both internal carotid arteries,   suspicious for fibromuscular dysplasia.  2.  Partial anomalous pulmonary venous return is noted in the left upper   lobe.    CT ANGIOGRAPHY BRAIN:   Compared to the prior CTA on 07/10/2023, there is   flow related enhancement within the inferior division of the right MCA at   site of prior occlusion.  Slightly distal to the location of the original   occlusion, there are now severe stenoses versus focal occlusions within   two proximal M2 branches of the inferior division of the right middle   cerebral artery (see key images, 605:58-60, and 5:414-430); and there is   now a distal M2 occlusion in the inferior division of the right middle   cerebral artery (605:61-62 and 5:447-449).    (7/10/23):  CT head: Acute Rt temporal parietal infarct without hemorrhagic conversion.  CTA: Bilateral internal carotid artery fibromuscular dysplasia. Right distal M1 occlusion.  CTP: Core infarct in the right parietal temporal region of 23 mL with a penumbra of 104 mL    LE doppler neg for DVT.       Due to amyloidosis being a hypercoagulable state: recommend anticoagulation. Recommend CTH on 7/17 to access candidacy to start AC. Call Stroke fellow to review images [phone].      PT/OT recommended AR. Stable for discharge

## 2023-07-14 NOTE — DISCHARGE NOTE PROVIDER - NSDCCPCAREPLAN_GEN_ALL_CORE_FT
PRINCIPAL DISCHARGE DIAGNOSIS  Diagnosis: Stroke  Assessment and Plan of Treatment: Please follow up with neurologist in 1-2 weeks. The office will call you to schedule an appointment, if you do not hear from them please call [phone]. Continue taking medications as prescribed. If you were prescribed a statin for your cholesterol please make sure you have your liver enzymes checked with your primary care physician. Monitor your blood pressure. Reduce fat, cholesterol and salt in your diet. Increase intake of fruits and vegetables. Limit alcohol to minimum and do not smoke. You may be at risk for falling, make changes to your home to help you walk easier. Keep up to date on vaccinations.  If you experience any symptoms of facial drooping, slurred speech, arm or leg weakness, severe headache, vision changes or any worsening symptoms, notify provider immediately and return to ER.

## 2023-07-14 NOTE — H&P ADULT - NSHPLABSRESULTS_GEN_ALL_CORE
RECENT LABS/IMAGING                        14.3   9.08  )-----------( 222      ( 14 Jul 2023 06:33 )             44.5     07-14    136  |  104  |  15  ----------------------------<  83  4.1   |  22  |  0.99    Ca    9.2      14 Jul 2023 06:33      Urinalysis Basic - ( 14 Jul 2023 06:33 )    Color: x / Appearance: x / SG: x / pH: x  Gluc: 83 mg/dL / Ketone: x  / Bili: x / Urobili: x   Blood: x / Protein: x / Nitrite: x   Leuk Esterase: x / RBC: x / WBC x   Sq Epi: x / Non Sq Epi: x / Bacteria: x      < from: TTE W or WO Ultrasound Enhancing Agent (07.11.23 @ 06:33) >  CONCLUSIONS:      1. Technically difficult image quality.   2. Mild left ventricular hypertrophy.   3. There is increased LV mass and concentric hypertrophy.   4. The left ventricular systolic function is moderately decreased with an ejection fraction of 36 % by Harrington's method of disks. There is global left ventricular hypokinesis. No evidence of a thrombus in the left ventricle.   5. There is severe (grade 3) left ventricular diastolic dysfunction.   6. Moderate-severely enlarged right ventricular cavity size, normal right ventricular wall thickness and reduced systolic right ventricular function.   7. Mild to moderate mitral regurgitation.   8. Trileaflet aortic valve with normal systolic excursion.   9. Trace aortic regurgitation.  10. The right atrium is severely dilated.  11. Estimated pulmonary artery systolic pressure is 41 mmHg, consistent with mild pulmonary hypertension.  12. No evidence of a patent foramen ovale by color flow Doppler.  13. Agitated saline injection was negative for intracardiac shunt.  14. No prior echocardiogram is available for comparison.    < end of copied text >  < from: MR Head No Cont (07.13.23 @ 22:18) >  IMPRESSION:  Limited exam due to motion.    Evolving acute right MCA territorial infarct as described above    < end of copied text >  < from: VA Duplex Lower Ext Vein Scan, Bilat (07.11.23 @ 12:12) >  IMPRESSION:  No evidence of deep venous thrombosis in either lower extremity.    < end of copied text >  < from: CT Head No Cont (07.11.23 @ 09:41) >  IMPRESSION: Redemonstrated acute right MCA territory infarct without   hemorrhagic conversion    < end of copied text >  < from: CT Angio Head w/ IV Cont (07.11.23 @ 04:54) >  IMPRESSION:  NONCONTRAST HEAD CT SCAN:  1.  Redemonstration of large acute infarct in the inferior division of   the right MCA vascular territory, involving posterior right temporal   lobe, right temporal parietal region, and lateral right occipital lobe.  2.  There is new patchy hypoattenuation in the subcortical/deep white   matter of the right frontal lobe and new loss of gray matter-white matter   differentiation in the right postcentral gyrus, compatible with new   developing infarct in the right middle cerebral artery vascular territory.  3.  No hemorrhagic conversion.    CT ANGIOGRAPHY NECK:  1.  Again seen is a beaded appearance of both internal carotid arteries,   suspicious for fibromuscular dysplasia.  2.  Partial anomalous pulmonary venous return is noted in the left upper   lobe.      CT ANGIOGRAPHY BRAIN:   Compared to the prior CTA on 07/10/2023, there is   flow related enhancement within the inferior division of the right MCA at   site of prior occlusion.  Slightly distal to the location of the original   occlusion, there are now severe stenosesversus focal occlusions within   two proximal M2 branches of the inferior division of the right middle   cerebral artery (see key images, 605:58-60, and 5:414-430); and there is   now a distal M2 occlusion in the inferior division of the right middle  cerebral artery (605:61-62 and 5:447-449).

## 2023-07-14 NOTE — PROGRESS NOTE ADULT - NS ATTEND AMEND GEN_ALL_CORE FT
Thirty five minutes of discharge time was spent on patient exam and discussion including test results, pathophysiology, natural history, stroke risk factors, medication changes and secondary prophylaxis and importance of medication compliance. We also discussed follow up plan. This was discussed with patient/family, who expresses understanding. Thirty five minutes of discharge time was spent on patient exam and discussion including test results, pathophysiology, natural history, stroke risk factors, medication changes and secondary prophylaxis and importance of medication compliance. We also discussed follow up plan. This was discussed with patient/family, who expresses understanding. [facility] Monday, contact stroke fellow on call for review. Eliquis candidate.

## 2023-07-14 NOTE — PROGRESS NOTE ADULT - ASSESSMENT
ASSESSMENT: HPI: 64yo R handed M PMHx recent amyloidosis diagnosis presents to ED for dysarthria, L hemiparesis. Patient accompanied by daughter and wife who all state LKW 9PM 7/9/23. Patient during yesterday felt generally tired as he does from amyloidosis diagnosis but was able to ambulate to [employer], go to the beach. Then after 9PM last night, he states he might have felt a little weak in his "R leg" (but suspect L leg given exam) and slipped in the bathroom and was down for <1 hour and found by spouse. Then today AM when daughter returned from [place], she noted he was slurring with weakness for which he came to ED. Not on ac/ ap agents. Here, NIHSS 9. preMRS 0.     Impression: Dysarthria, LHH, L hemiparesis from R MCA territory ischemic stroke s/p R M1/M2 thrombectomyon 7/10. Mechanism: cardioembolic i/s/o amyloidosis and risk of afib vs ESUS    NEURO: Neurologic examination with improvement since admission. Continue close monitoring for neurologic deterioration. Permissive HTN to gradual normotension. , continue atorvastatin 80mg qhs- titrate statin to LDL goal less than 70. MRI Brain w/o pending. Physical therapy/OT/Speech eval recommend acute rehab.     ANTITHROMBOTIC THERAPY: Aspirin 81mg daily for secondary stroke prevention. Plan to repeat CTH on Saturday 7/15 prior to starting AC    PULMONARY: CXR clear, protecting airway, saturating well     CARDIOVASCULAR: TTE shows:  Mild LVH,  There is increased LV mass and concentric hypertrophy, EF 36%, global left ventricular hypokinesis. No evidence of a thrombus in the left ventricle. There is severe (grade 3) left ventricular diastolic dysfunction. Moderate-severely enlarged right ventricular cavity size, normal right ventricular wall thickness and reduced systolic right ventricular function. Mild to moderate MR, Trace AR ,  RA is severely dilated, mild pulmonary hypertension, No PFO, continue with cardiac monitoring. Patients cardiologist, Dr. Romero, recommending AC for amyloidosis, which increases risk for afib.                              SBP goal: 100-160    GASTROINTESTINAL:  dysphagia screen passed, tolerating diet        Diet: soft and bite sized     RENAL: BUN/Cr stable, good urine output      Na Goal: Greater than 135     Lopez: No     HEMATOLOGY: H/H stable, Platelets 222. B/L LE duplex negative for dvt.     DVT ppx: Heparin s.c [] LMWH [x]     ID: afebrile, no leukocytosis     OTHER: All questions and concerns addressed with patient and family at bedside.  Enrolled in sleep smart study.     DISPOSITION: Acute Rehab once stable and workup is complete.    CORE MEASURES:        Admission NIHSS: 9     TPA: [] YES [x] NO      LDL/HDL:      Depression Screen: p     Statin Therapy: y     Dysphagia Screen: [x] PASS [] FAIL     Smoking [] YES [x] NO      Afib [] YES [x] NO     Stroke Education [x] YES [] NO    Obtain screening lower extremity venous ultrasound in patients who meet 1 or more of the following criteria as patient is high risk for DVT/PE on admission:   [] History of DVT/PE  []Hypercoagulable states (Factor V Leiden, Cancer, OCP, etc. )  []Prolonged immobility (hemiplegia/hemiparesis/post operative or any other extended immobilization)  [] Transferred from outside facility (Rehab or Long term care)  [] Age </= to 50.   ASSESSMENT: HPI: 64yo R handed M PMHx recent amyloidosis diagnosis presents to ED for dysarthria, L hemiparesis. Patient accompanied by daughter and wife who all state LKW 9PM 7/9/23. Patient during yesterday felt generally tired as he does from amyloidosis diagnosis but was able to ambulate to [employer], go to the beach. Then after 9PM last night, he states he might have felt a little weak in his "R leg" (but suspect L leg given exam) and slipped in the bathroom and was down for <1 hour and found by spouse. Then today AM when daughter returned from [place], she noted he was slurring with weakness for which he came to ED. Not on ac/ ap agents. Here, NIHSS 9. preMRS 0.     Impression: Dysarthria, LHH, L hemiparesis from R MCA territory ischemic stroke s/p R M1/M2 thrombectomy on 7/10. Mechanism: cardioembolic vs hypercoagulability i/s/o amyloidosis and risk of afib    NEURO: Neurologic examination with improvement since admission. Continue close monitoring for neurologic deterioration. Permissive HTN to gradual normotension. , continue atorvastatin 80mg qhs- titrate statin to LDL goal less than 70. MRI Brain w/o as above. Physical therapy/OT/Speech eval recommend acute rehab.     ANTITHROMBOTIC THERAPY: Aspirin 81mg daily for secondary stroke prevention. Plan to repeat CTH on Monday 7/17 prior to starting AC    PULMONARY: CXR clear, protecting airway, saturating well     CARDIOVASCULAR: TTE shows:  Mild LVH,  There is increased LV mass and concentric hypertrophy, EF 36%, global left ventricular hypokinesis. No evidence of a thrombus in the left ventricle. There is severe (grade 3) left ventricular diastolic dysfunction. Moderate-severely enlarged right ventricular cavity size, normal right ventricular wall thickness and reduced systolic right ventricular function. Mild to moderate MR, Trace AR ,  RA is severely dilated, mild pulmonary hypertension, No PFO. Continue with cardiac monitoring. Patients cardiologist, Dr. Romero, recommending AC for amyloidosis, which increases risk for afib.                              SBP goal: 100-160    GASTROINTESTINAL:  dysphagia screen passed, tolerating diet        Diet: soft and bite sized     RENAL: BUN/Cr stable, good urine output      Na Goal: Greater than 135     Lopez: No     HEMATOLOGY: H/H stable, Platelets 222. B/L LE duplex negative for dvt.     DVT ppx: Heparin s.c [] LMWH [x]     ID: afebrile, no leukocytosis     OTHER: All questions and concerns addressed with patient and family at bedside.  Enrolled in sleep smart study.     DISPOSITION: Acute Rehab as per PT/OT, medically stable     CORE MEASURES:        Admission NIHSS: 9     TPA: [] YES [x] NO      LDL/HDL:      Depression Screen: p     Statin Therapy: y     Dysphagia Screen: [x] PASS [] FAIL     Smoking [] YES [x] NO      Afib [] YES [x] NO     Stroke Education [x] YES [] NO    Obtain screening lower extremity venous ultrasound in patients who meet 1 or more of the following criteria as patient is high risk for DVT/PE on admission:   [] History of DVT/PE  []Hypercoagulable states (Factor V Leiden, Cancer, OCP, etc. )  []Prolonged immobility (hemiplegia/hemiparesis/post operative or any other extended immobilization)  [] Transferred from outside facility (Rehab or Long term care)  [] Age </= to 50.

## 2023-07-14 NOTE — DISCHARGE NOTE PROVIDER - NSDCMRMEDTOKEN_GEN_ALL_CORE_FT
acetaminophen 325 mg oral tablet: 2 tab(s) orally every 6 hours As needed Temp greater or equal to 38C (100.4F), Mild Pain (1 - 3)  aspirin 81 mg oral tablet, chewable: 1 tab(s) orally once a day  atorvastatin 80 mg oral tablet: 1 tab(s) orally once a day (at bedtime)  Lasix 20 mg oral tablet: 2 tab(s) orally once a day  ocular lubricant ophthalmic solution: 1 drop(s) to each affected eye every 6 hours  polyethylene glycol 3350 oral powder for reconstitution: 17 gram(s) orally once a day  senna leaf extract oral tablet: 2 tab(s) orally once a day (at bedtime)

## 2023-07-14 NOTE — DISCHARGE NOTE PROVIDER - NSDCFUADDAPPT_GEN_ALL_CORE_FT
Due to amyloidosis being a hypercoagulable state: recommend anticoagulation. Recommend CTH on 7/17 to access candidacy to start AC. Call Stroke fellow to review images [phone].

## 2023-07-14 NOTE — PROGRESS NOTE ADULT - SUBJECTIVE AND OBJECTIVE BOX
THE PATIENT WAS SEEN AND EXAMINED BY ME WITH THE HOUSESTAFF AND STROKE TEAM DURING MORNING ROUNDS.   HPI: 62yo R handed M PMHx recent amyloidosis diagnosis presents to ED for dysarthria, L hemiparesis. Patient accompanied by daughter and wife who all state LKW 9PM 7/9/23. Patient during yesterday felt generally tired as he does from amyloidosis diagnosis but was able to ambulate to [facility], go to the beach. Then after 9PM last night, he states he might have felt a little weak in his "R leg" (but suspect L leg given exam) and slipped in the bathroom and was down for <1 hour and found by spouse. Then today AM when daughter returned from [place], she noted he was slurring with weakness for which he came to ED. Not on ac/ ap agents. Here, NIHSS 9. preMRS 0.     SUBJECTIVE: "[place] No events overnight.  No new neurologic complaints.  ROS negative unless otherwise noted.    acetaminophen     Tablet .. 650 milliGRAM(s) Oral every 6 hours PRN  artificial tears (preservative free) Ophthalmic Solution 1 Drop(s) Both EYES every 6 hours  aspirin  chewable 81 milliGRAM(s) Oral daily  atorvastatin 80 milliGRAM(s) Oral at bedtime  enoxaparin Injectable 40 milliGRAM(s) SubCutaneous <User Schedule>  ondansetron Injectable 4 milliGRAM(s) IV Push once PRN  oxyCODONE    IR 5 milliGRAM(s) Oral every 4 hours PRN  oxyCODONE    IR 10 milliGRAM(s) Oral every 4 hours PRN  polyethylene glycol 3350 17 Gram(s) Oral daily  senna 2 Tablet(s) Oral at bedtime    PHYSICAL EXAM:   Vital Signs Last 24 Hrs  T(C): 36.6 (13 Jul 2023 08:00), Max: 36.9 (12 Jul 2023 20:00)  T(F): 97.9 (13 Jul 2023 08:00), Max: 98.5 (12 Jul 2023 20:00)  HR: 70 (13 Jul 2023 10:00) (57 - 74)  BP: 108/74 (13 Jul 2023 10:00) (102/76 - 119/76)  BP(mean): 88 (13 Jul 2023 08:00) (80 - 90)  RR: 15 (13 Jul 2023 10:00) (13 - 25)  SpO2: 98% (13 Jul 2023 10:00) (95% - 100%)    Parameters below as of 13 Jul 2023 10:00  Patient On (Oxygen Delivery Method): room air    General - Awake and alert, in no acute distress sitting upright in chair   Cardiovascular - Peripheral pulses palpable, no edema    Neurologic Exam:  Mental status - Sitting in chair. Alert to self, hospital, follows simple commands  Language: Mild dysarthria, speech fluent, repetition intact.   Cranial nerves - PERRLA, left visual field deficit (LHH), mild L facial palsy, EOMI, face sensation (V1-V3) intact b/l, facial strength intact without asymmetry b/l, hearing intact b/l, palate with symmetric elevation, trapezius 5/5 strength b/l, tongue midline on protrusion with full lateral movement  Motor - Normal bulk and tone throughout.  Mild left pronator drift.  LLE antigravity. RUE and RLE no drifts.    Sensation - Light touch intact throughout  Coordination - Finger to nose intact b/l. Slower fast finger movement on L than R. No tremors appreciated  Gait: Deferred       LABS:                        13.7   9.85  )-----------( 209      ( 13 Jul 2023 05:32 )             42.7    07-13    138  |  106  |  9   ----------------------------<  81  3.9   |  19<L>  |  0.96    Ca    9.2      13 Jul 2023 05:32  Phos  3.1     07-11  Mg     1.8     07-11      IMAGING: Reviewed by me.     [facility] (7/11/23 @ 11AM): Redemonstrated acute right MCA territory infarct without hemorrhagic conversion    (7/11/23):   NONCONTRAST HEAD CT SCAN:  1.  Redemonstration of large acute infarct in the inferior division of   the right MCA vascular territory, involving posterior right temporal   lobe, right temporal parietal region, and lateral right occipital lobe.  2.  There is new patchy hypoattenuation in the subcortical/deep white   matter of the right frontal lobe and new loss of gray matter-white matter   differentiation in the right postcentral gyrus, compatible with new   developing infarct in the right middle cerebral artery vascular territory.  3.  No hemorrhagic conversion.    CT ANGIOGRAPHY NECK:  1.  Again seen is a beaded appearance of both internal carotid arteries,   suspicious for fibromuscular dysplasia.  2.  Partial anomalous pulmonary venous return is noted in the left upper   lobe.    CT ANGIOGRAPHY BRAIN:   Compared to the prior CTA on 07/10/2023, there is   flow related enhancement within the inferior division of the right MCA at   site of prior occlusion.  Slightly distal to the location of the original   occlusion, there are now severe stenoses versus focal occlusions within   two proximal M2 branches of the inferior division of the right middle   cerebral artery (see key images, 605:58-60, and 5:414-430); and there is   now a distal M2 occlusion in the inferior division of the right middle   cerebral artery (605:61-62 and 5:447-449).    (7/10/23):  CT head: Acute Rt temporal parietal infarct without hemorrhagic conversion.  CTA: Bilateral internal carotid artery fibromuscular dysplasia. Right distal M1 occlusion.  CTP: Core infarct in the right parietal temporal region of 23 mL with a penumbra of 104 mL THE PATIENT WAS SEEN AND EXAMINED BY ME WITH THE HOUSESTAFF AND STROKE TEAM DURING MORNING ROUNDS.   HPI: 62yo R handed M PMHx recent amyloidosis diagnosis presents to ED for dysarthria, L hemiparesis. Patient accompanied by daughter and wife who all state LKW 9PM 7/9/23. Patient during yesterday felt generally tired as he does from amyloidosis diagnosis but was able to ambulate to [facility], go to the beach. Then after 9PM last night, he states he might have felt a little weak in his "R leg" (but suspect L leg given exam) and slipped in the bathroom and was down for <1 hour and found by spouse. Then today AM when daughter returned from [place], she noted he was slurring with weakness for which he came to ED. Not on ac/ ap agents. Here, NIHSS 9. preMRS 0.     SUBJECTIVE:  No events overnight.  No new neurologic complaints.  ROS negative unless otherwise noted.    acetaminophen     Tablet .. 650 milliGRAM(s) Oral every 6 hours PRN  artificial tears (preservative free) Ophthalmic Solution 1 Drop(s) Both EYES every 6 hours  aspirin  chewable 81 milliGRAM(s) Oral daily  atorvastatin 80 milliGRAM(s) Oral at bedtime  enoxaparin Injectable 40 milliGRAM(s) SubCutaneous <User Schedule>  ondansetron Injectable 4 milliGRAM(s) IV Push once PRN  oxyCODONE    IR 5 milliGRAM(s) Oral every 4 hours PRN  oxyCODONE    IR 10 milliGRAM(s) Oral every 4 hours PRN  polyethylene glycol 3350 17 Gram(s) Oral daily  senna 2 Tablet(s) Oral at bedtime    PHYSICAL EXAM:   Vital Signs Last 24 Hrs  T(C): 36.6 (13 Jul 2023 08:00), Max: 36.9 (12 Jul 2023 20:00)  T(F): 97.9 (13 Jul 2023 08:00), Max: 98.5 (12 Jul 2023 20:00)  HR: 70 (13 Jul 2023 10:00) (57 - 74)  BP: 108/74 (13 Jul 2023 10:00) (102/76 - 119/76)  BP(mean): 88 (13 Jul 2023 08:00) (80 - 90)  RR: 15 (13 Jul 2023 10:00) (13 - 25)  SpO2: 98% (13 Jul 2023 10:00) (95% - 100%)    Parameters below as of 13 Jul 2023 10:00  Patient On (Oxygen Delivery Method): room air    General - No acute distress, sitting upright in chair   Cardiovascular - Peripheral pulses palpable, no edema    Neurologic Exam:  Mental status - Sitting in chair. Awake and alert, Oriented to self and hospital, follows simple commands  Language: Mild dysarthria, speech fluent, repetition intact.   Cranial nerves - PERRLA, left visual field deficit (LHH), mild L facial palsy, EOMI, face sensation (V1-V3) intact b/l, facial strength intact without asymmetry b/l, hearing intact b/l, palate with symmetric elevation, trapezius 5/5 strength b/l, tongue midline   Motor - Normal bulk and tone throughout.  Mild left pronator drift.  LLE antigravity. RUE and RLE move against gravity with no drifts.    Sensation - Light touch intact throughout  Coordination - Finger to nose intact b/l. Slower fast finger movement on L than R. No tremors appreciated  Gait: Deferred       LABS:                        13.7   9.85  )-----------( 209      ( 13 Jul 2023 05:32 )             42.7    07-13    138  |  106  |  9   ----------------------------<  81  3.9   |  19<L>  |  0.96    Ca    9.2      13 Jul 2023 05:32  Phos  3.1     07-11  Mg     1.8     07-11      IMAGING: Reviewed by me.     MRI Brain 7/13/23: Multiple acute infarcts noted in the right MCA territory largest area measuring 7.9 x 3.4 cm. There are gradient susceptibility affects most   compatible with petechial hemorrhages. The larger area is not significantly changed in size given differences in technique    [facility] (7/11/23 @ 11AM): Redemonstrated acute right MCA territory infarct without hemorrhagic conversion    (7/11/23):   NONCONTRAST HEAD CT SCAN:  1.  Redemonstration of large acute infarct in the inferior division of   the right MCA vascular territory, involving posterior right temporal   lobe, right temporal parietal region, and lateral right occipital lobe.  2.  There is new patchy hypoattenuation in the subcortical/deep white   matter of the right frontal lobe and new loss of gray matter-white matter   differentiation in the right postcentral gyrus, compatible with new   developing infarct in the right middle cerebral artery vascular territory.  3.  No hemorrhagic conversion.    CT ANGIOGRAPHY NECK:  1.  Again seen is a beaded appearance of both internal carotid arteries,   suspicious for fibromuscular dysplasia.  2.  Partial anomalous pulmonary venous return is noted in the left upper   lobe.    CT ANGIOGRAPHY BRAIN:   Compared to the prior CTA on 07/10/2023, there is   flow related enhancement within the inferior division of the right MCA at   site of prior occlusion.  Slightly distal to the location of the original   occlusion, there are now severe stenoses versus focal occlusions within   two proximal M2 branches of the inferior division of the right middle   cerebral artery (see key images, 605:58-60, and 5:414-430); and there is   now a distal M2 occlusion in the inferior division of the right middle   cerebral artery (605:61-62 and 5:447-449).    (7/10/23):  CT head: Acute Rt temporal parietal infarct without hemorrhagic conversion.  CTA: Bilateral internal carotid artery fibromuscular dysplasia. Right distal M1 occlusion.  CTP: Core infarct in the right parietal temporal region of 23 mL with a penumbra of 104 mL

## 2023-07-14 NOTE — DISCHARGE NOTE PROVIDER - CARE PROVIDER_API CALL
Sarah Damian  NP in Family Health  [address], [address]  [place] [address]  Phone: [phone]  Fax: [phone]  Follow Up Time: 2 weeks    Ayush Romero  Cardiology  [address], [address]  [place] [address]  Phone: [phone]  Fax: [phone]  Follow Up Time: 2 weeks

## 2023-07-14 NOTE — DISCHARGE NOTE PROVIDER - PROVIDER TOKENS
PROVIDER:[TOKEN:[[id number]],FOLLOWUP:[2 weeks]],PROVIDER:[TOKEN:[[id number]],FOLLOWUP:[2 weeks]]

## 2023-07-14 NOTE — H&P ADULT - NSHPSOCIALHISTORY_GEN_ALL_CORE
Smoking - Denied  EtOH - Denied   Drugs - Denied     Lives   PTA: Independent in ADLs and ambulation     CURRENT FUNCTIONAL STATUS  Bed Mobility:   Transfers:   Gait: Smoking - Denied  EtOH - Denied   Drugs - Denied     Pt. lives in coop apt. with wife, steps/elevator to get in. Pt drives, uses glasses for distance. Pt R hand dominant. No HHA services.  PTA: Independent in ADLs and ambulation     CURRENT FUNCTIONAL STATUS  PT 7/14  Sit-stand: Min A  Gait: Min A RW 10ft x 2    OT 7/14  Grooming: Min A  Toileting: min A

## 2023-07-14 NOTE — H&P ADULT - HISTORY OF PRESENT ILLNESS
62yo R handed M PMHx recent amyloidosis diagnosis presented to [facility] on 7/10 for dysarthria and L hemiparesis. Patient found to have a R MCA territory infarct without hemorrhagic conversion. Suspected cardioembolic in origin in the setting of amyloidosis vs risk of a fib. Patient recommended to start full dose AC given hypercoagulable state 2/2 amyloidosis. Recommend CTH on 7/17 to access candidacy to start AC. Patient evaluated by PT/OT and recommended for acute inpatient rehab. Patient is medically stable for discharge to [facility] on 7/14.   64yo R handed M PMHx recent amyloidosis diagnosis presented to [facility] on 7/10 for dysarthria and L hemiparesis. Patient found to have a R MCA territory infarct without hemorrhagic conversion. Suspected cardioembolic in origin in the setting of amyloidosis vs risk of a fib. Patient recommended to start full dose AC given hypercoagulable state 2/2 amyloidosis. Recommend CTH on 7/17 to access candidacy to start AC. Patient evaluated by PT/OT and recommended for acute inpatient rehab. Patient is medically stable for discharge to [facility] on 7/14.

## 2023-07-14 NOTE — PATIENT PROFILE ADULT - FALL HARM RISK - FACTORS
Received patient into 245 alert and oriented Family at bedside call light in reach Post Op/Post procedure/Weakness

## 2023-07-15 LAB
ALBUMIN SERPL ELPH-MCNC: 2.9 G/DL — LOW (ref 3.3–5)
ALP SERPL-CCNC: 122 U/L — HIGH (ref 40–120)
ALT FLD-CCNC: 58 U/L — HIGH (ref 10–45)
ANION GAP SERPL CALC-SCNC: 10 MMOL/L — SIGNIFICANT CHANGE UP (ref 5–17)
AST SERPL-CCNC: 46 U/L — HIGH (ref 10–40)
BASOPHILS # BLD AUTO: 0.07 K/UL — SIGNIFICANT CHANGE UP (ref 0–0.2)
BASOPHILS NFR BLD AUTO: 0.9 % — SIGNIFICANT CHANGE UP (ref 0–2)
BILIRUB SERPL-MCNC: 1.6 MG/DL — HIGH (ref 0.2–1.2)
BUN SERPL-MCNC: 15 MG/DL — SIGNIFICANT CHANGE UP (ref 7–23)
CALCIUM SERPL-MCNC: 9.1 MG/DL — SIGNIFICANT CHANGE UP (ref 8.4–10.5)
CHLORIDE SERPL-SCNC: 102 MMOL/L — SIGNIFICANT CHANGE UP (ref 96–108)
CO2 SERPL-SCNC: 24 MMOL/L — SIGNIFICANT CHANGE UP (ref 22–31)
CREAT SERPL-MCNC: 0.92 MG/DL — SIGNIFICANT CHANGE UP (ref 0.5–1.3)
EGFR: 94 ML/MIN/1.73M2 — SIGNIFICANT CHANGE UP
EOSINOPHIL # BLD AUTO: 0.35 K/UL — SIGNIFICANT CHANGE UP (ref 0–0.5)
EOSINOPHIL NFR BLD AUTO: 4.4 % — SIGNIFICANT CHANGE UP (ref 0–6)
GLUCOSE SERPL-MCNC: 83 MG/DL — SIGNIFICANT CHANGE UP (ref 70–99)
HCT VFR BLD CALC: 45.2 % — SIGNIFICANT CHANGE UP (ref 39–50)
HGB BLD-MCNC: 14.7 G/DL — SIGNIFICANT CHANGE UP (ref 13–17)
IMM GRANULOCYTES NFR BLD AUTO: 0.4 % — SIGNIFICANT CHANGE UP (ref 0–0.9)
LYMPHOCYTES # BLD AUTO: 1.75 K/UL — SIGNIFICANT CHANGE UP (ref 1–3.3)
LYMPHOCYTES # BLD AUTO: 21.8 % — SIGNIFICANT CHANGE UP (ref 13–44)
MCHC RBC-ENTMCNC: 29.2 PG — SIGNIFICANT CHANGE UP (ref 27–34)
MCHC RBC-ENTMCNC: 32.5 GM/DL — SIGNIFICANT CHANGE UP (ref 32–36)
MCV RBC AUTO: 89.9 FL — SIGNIFICANT CHANGE UP (ref 80–100)
MONOCYTES # BLD AUTO: 0.66 K/UL — SIGNIFICANT CHANGE UP (ref 0–0.9)
MONOCYTES NFR BLD AUTO: 8.2 % — SIGNIFICANT CHANGE UP (ref 2–14)
NEUTROPHILS # BLD AUTO: 5.17 K/UL — SIGNIFICANT CHANGE UP (ref 1.8–7.4)
NEUTROPHILS NFR BLD AUTO: 64.3 % — SIGNIFICANT CHANGE UP (ref 43–77)
NRBC # BLD: 0 /100 WBCS — SIGNIFICANT CHANGE UP (ref 0–0)
PLATELET # BLD AUTO: 227 K/UL — SIGNIFICANT CHANGE UP (ref 150–400)
POTASSIUM SERPL-MCNC: 3.7 MMOL/L — SIGNIFICANT CHANGE UP (ref 3.5–5.3)
POTASSIUM SERPL-SCNC: 3.7 MMOL/L — SIGNIFICANT CHANGE UP (ref 3.5–5.3)
PROT SERPL-MCNC: 7.1 G/DL — SIGNIFICANT CHANGE UP (ref 6–8.3)
RBC # BLD: 5.03 M/UL — SIGNIFICANT CHANGE UP (ref 4.2–5.8)
RBC # FLD: 12.9 % — SIGNIFICANT CHANGE UP (ref 10.3–14.5)
SODIUM SERPL-SCNC: 136 MMOL/L — SIGNIFICANT CHANGE UP (ref 135–145)
WBC # BLD: 8.03 K/UL — SIGNIFICANT CHANGE UP (ref 3.8–10.5)
WBC # FLD AUTO: 8.03 K/UL — SIGNIFICANT CHANGE UP (ref 3.8–10.5)

## 2023-07-15 PROCEDURE — 99222 1ST HOSP IP/OBS MODERATE 55: CPT

## 2023-07-15 PROCEDURE — 99223 1ST HOSP IP/OBS HIGH 75: CPT

## 2023-07-15 RX ORDER — NYSTATIN 500MM UNIT
5 POWDER (EA) MISCELLANEOUS
Refills: 0 | Status: DISCONTINUED | OUTPATIENT
Start: 2023-07-15 | End: 2023-07-21

## 2023-07-15 RX ADMIN — Medication 1 DROP(S): at 21:19

## 2023-07-15 RX ADMIN — ENOXAPARIN SODIUM 40 MILLIGRAM(S): 100 INJECTION SUBCUTANEOUS at 17:52

## 2023-07-15 RX ADMIN — ATORVASTATIN CALCIUM 80 MILLIGRAM(S): 80 TABLET, FILM COATED ORAL at 21:19

## 2023-07-15 RX ADMIN — Medication 1 DROP(S): at 14:05

## 2023-07-15 RX ADMIN — DIPHENHYDRAMINE HYDROCHLORIDE AND LIDOCAINE HYDROCHLORIDE AND ALUMINUM HYDROXIDE AND MAGNESIUM HYDRO 15 MILLILITER(S): KIT at 17:52

## 2023-07-15 RX ADMIN — Medication 81 MILLIGRAM(S): at 14:05

## 2023-07-15 RX ADMIN — Medication 5 MILLILITER(S): at 17:52

## 2023-07-15 RX ADMIN — Medication 1 DROP(S): at 05:44

## 2023-07-15 RX ADMIN — DIPHENHYDRAMINE HYDROCHLORIDE AND LIDOCAINE HYDROCHLORIDE AND ALUMINUM HYDROXIDE AND MAGNESIUM HYDRO 15 MILLILITER(S): KIT at 05:44

## 2023-07-15 RX ADMIN — Medication 1 DROP(S): at 17:52

## 2023-07-16 PROCEDURE — 99232 SBSQ HOSP IP/OBS MODERATE 35: CPT

## 2023-07-16 PROCEDURE — 99231 SBSQ HOSP IP/OBS SF/LOW 25: CPT

## 2023-07-16 RX ADMIN — DIPHENHYDRAMINE HYDROCHLORIDE AND LIDOCAINE HYDROCHLORIDE AND ALUMINUM HYDROXIDE AND MAGNESIUM HYDRO 15 MILLILITER(S): KIT at 05:11

## 2023-07-16 RX ADMIN — Medication 5 MILLILITER(S): at 17:33

## 2023-07-16 RX ADMIN — ATORVASTATIN CALCIUM 80 MILLIGRAM(S): 80 TABLET, FILM COATED ORAL at 21:13

## 2023-07-16 RX ADMIN — Medication 1 DROP(S): at 17:32

## 2023-07-16 RX ADMIN — ENOXAPARIN SODIUM 40 MILLIGRAM(S): 100 INJECTION SUBCUTANEOUS at 17:32

## 2023-07-16 RX ADMIN — Medication 81 MILLIGRAM(S): at 11:12

## 2023-07-16 RX ADMIN — Medication 5 MILLILITER(S): at 05:11

## 2023-07-16 RX ADMIN — DIPHENHYDRAMINE HYDROCHLORIDE AND LIDOCAINE HYDROCHLORIDE AND ALUMINUM HYDROXIDE AND MAGNESIUM HYDRO 15 MILLILITER(S): KIT at 17:33

## 2023-07-16 RX ADMIN — Medication 1 DROP(S): at 05:11

## 2023-07-16 RX ADMIN — Medication 1 DROP(S): at 11:13

## 2023-07-16 RX ADMIN — POLYETHYLENE GLYCOL 3350 17 GRAM(S): 17 POWDER, FOR SOLUTION ORAL at 11:12

## 2023-07-16 NOTE — PROGRESS NOTE ADULT - SUBJECTIVE AND OBJECTIVE BOX
Patient is a 63y old  Male who presents with a chief complaint of CVA (15 Jul 2023 10:16)    Patient seen and examined at bedside. No acute overnight events.     ALLERGIES:  No Known Allergies    MEDICATIONS  (STANDING):  artificial tears (preservative free) Ophthalmic Solution 1 Drop(s) Both EYES every 6 hours  aspirin  chewable 81 milliGRAM(s) Oral daily  atorvastatin 80 milliGRAM(s) Oral at bedtime  enoxaparin Injectable 40 milliGRAM(s) SubCutaneous <User Schedule>  FIRST- Mouthwash  BLM 15 milliLiter(s) Swish and Spit two times a day  nystatin    Suspension 5 milliLiter(s) Oral two times a day  polyethylene glycol 3350 17 Gram(s) Oral daily  senna 2 Tablet(s) Oral at bedtime    MEDICATIONS  (PRN):  acetaminophen     Tablet .. 650 milliGRAM(s) Oral every 6 hours PRN Mild Pain (1 - 3)  melatonin 3 milliGRAM(s) Oral at bedtime PRN Insomnia  oxyCODONE    IR 10 milliGRAM(s) Oral every 4 hours PRN Severe Pain (7 - 10)  oxyCODONE    IR 5 milliGRAM(s) Oral every 4 hours PRN Moderate Pain (4 - 6)    Vital Signs Last 24 Hrs  T(F): 97.7 (16 Jul 2023 07:11), Max: 97.7 (16 Jul 2023 07:11)  HR: 67 (16 Jul 2023 07:11) (56 - 67)  BP: 114/77 (16 Jul 2023 07:11) (114/77 - 118/78)  RR: 16 (16 Jul 2023 07:11) (16 - 16)  SpO2: 97% (16 Jul 2023 07:11) (95% - 97%)      BMI (kg/m2): 26.9 (07-14-23 @ 17:20)    PHYSICAL EXAM:  General: NAD, awake, alert  ENT: Moist mucous membranes  Neck: Supple, no JVD  Lungs: Clear to auscultation bilaterally, no wheezes  Cardio: S1 S2, regular rate and rhythm  Abdomen: Soft, nontender, nondistended, bowel sounds present  Extremities: No calf tenderness, no pitting edema    LABS:                        14.7   8.03  )-----------( 227      ( 15 Jul 2023 06:23 )             45.2       07-15    136  |  102  |  15  ----------------------------<  83  3.7   |  24  |  0.92    Ca    9.1      15 Jul 2023 06:23    TPro  7.1  /  Alb  2.9  /  TBili  1.6  /  DBili  x   /  AST  46  /  ALT  58  /  AlkPhos  122  07-15         07-10 Chol 377 mg/dL LDL -- HDL 75 mg/dL Trig 357 mg/dL          Urinalysis Basic - ( 15 Jul 2023 06:23 )    Color: x / Appearance: x / SG: x / pH: x  Gluc: 83 mg/dL / Ketone: x  / Bili: x / Urobili: x   Blood: x / Protein: x / Nitrite: x   Leuk Esterase: x / RBC: x / WBC x   Sq Epi: x / Non Sq Epi: x / Bacteria: x            RADIOLOGY & ADDITIONAL TESTS:     Care Discussed with Consultants/Other Providers: PM&R

## 2023-07-16 NOTE — PROGRESS NOTE ADULT - ASSESSMENT
62 yo M with no PMH who presented to [facility] 7/10 with dysarthria and L hemiparesis; found to have R MCA infarct. Suspected to be cardioembolic in the setting of amyloidosis vs risk of A-fib. Now admitted to [facility] for rehab.     R MCA CVA  - Continue with ASA, statin  - [facility] 7/17 to determine starting AC (for hypercoagulable state d/t amyloidosis)   - PT/OT/ST, pain and bowel regimen per PMR    Amyloidosis   - outpatient follow up     Hyperlipidemia   - statin    DVT Prophylaxis  - Lovenox

## 2023-07-16 NOTE — PROGRESS NOTE ADULT - SUBJECTIVE AND OBJECTIVE BOX
Cc: Gait dysfunction    HPI: Patient with no new medical issues today.  Pain controlled, no chest pain, no N/V, no Fevers/Chills. No other new ROS  Has been tolerating rehabilitation program.    MEDICATIONS  (STANDING):  artificial tears (preservative free) Ophthalmic Solution 1 Drop(s) Both EYES every 6 hours  aspirin  chewable 81 milliGRAM(s) Oral daily  atorvastatin 80 milliGRAM(s) Oral at bedtime  enoxaparin Injectable 40 milliGRAM(s) SubCutaneous <User Schedule>  FIRST- Mouthwash  BLM 15 milliLiter(s) Swish and Spit two times a day  nystatin    Suspension 5 milliLiter(s) Oral two times a day  polyethylene glycol 3350 17 Gram(s) Oral daily  senna 2 Tablet(s) Oral at bedtime    MEDICATIONS  (PRN):  acetaminophen     Tablet .. 650 milliGRAM(s) Oral every 6 hours PRN Mild Pain (1 - 3)  melatonin 3 milliGRAM(s) Oral at bedtime PRN Insomnia  oxyCODONE    IR 10 milliGRAM(s) Oral every 4 hours PRN Severe Pain (7 - 10)  oxyCODONE    IR 5 milliGRAM(s) Oral every 4 hours PRN Moderate Pain (4 - 6)      Vital Signs Last 24 Hrs  T(C): 36.5 (16 Jul 2023 07:11), Max: 36.5 (16 Jul 2023 07:11)  T(F): 97.7 (16 Jul 2023 07:11), Max: 97.7 (16 Jul 2023 07:11)  HR: 67 (16 Jul 2023 07:11) (56 - 67)  BP: 114/77 (16 Jul 2023 07:11) (114/77 - 118/78)  BP(mean): --  RR: 16 (16 Jul 2023 07:11) (16 - 16)  SpO2: 97% (16 Jul 2023 07:11) (95% - 97%)    Parameters below as of 16 Jul 2023 07:11  Patient On (Oxygen Delivery Method): room air        In NAD  HEENT- EOMI  Heart- RRR, S1S2  Lungs- CTA bl.  Abd- + BS, NT  Ext- No calf pain  Neuro- Exam unchanged                          14.7   8.03  )-----------( 227      ( 15 Jul 2023 06:23 )             45.2     07-15    136  |  102  |  15  ----------------------------<  83  3.7   |  24  |  0.92    Ca    9.1      15 Jul 2023 06:23    TPro  7.1  /  Alb  2.9<L>  /  TBili  1.6<H>  /  DBili  x   /  AST  46<H>  /  ALT  58<H>  /  AlkPhos  122<H>  07-15    CAPILLARY BLOOD GLUCOSE        Urinalysis Basic - ( 15 Jul 2023 06:23 )    Color: x / Appearance: x / SG: x / pH: x  Gluc: 83 mg/dL / Ketone: x  / Bili: x / Urobili: x   Blood: x / Protein: x / Nitrite: x   Leuk Esterase: x / RBC: x / WBC x   Sq Epi: x / Non Sq Epi: x / Bacteria: x                Imp: Patient with diagnosis of          admitted for comprehensive acute rehabilitation.    Plan:  - Continue therapies  - DVT prophylaxis  - Skin- Turn q2h, check skin daily  - Continue current medications; patient medically stable.   - Patient is stable to continue current rehabilitation program.    Cc: Gait dysfunction    HPI: Patient seen and examined  Daughter present and provided additional collateral on events and patient's function in the last 24 hr  Patient is more engaging today  Reports no distress, had normal BM today, slept well  Daughter corroborated patient's account of events     ROS  Pain controlled, no chest pain, no N/V, no Fevers/Chills.     Has been tolerating rehabilitation program.    MEDICATIONS  (STANDING):  artificial tears (preservative free) Ophthalmic Solution 1 Drop(s) Both EYES every 6 hours  aspirin  chewable 81 milliGRAM(s) Oral daily  atorvastatin 80 milliGRAM(s) Oral at bedtime  enoxaparin Injectable 40 milliGRAM(s) SubCutaneous <User Schedule>  FIRST- Mouthwash  BLM 15 milliLiter(s) Swish and Spit two times a day  nystatin    Suspension 5 milliLiter(s) Oral two times a day  polyethylene glycol 3350 17 Gram(s) Oral daily  senna 2 Tablet(s) Oral at bedtime    MEDICATIONS  (PRN):  acetaminophen     Tablet .. 650 milliGRAM(s) Oral every 6 hours PRN Mild Pain (1 - 3)  melatonin 3 milliGRAM(s) Oral at bedtime PRN Insomnia  oxyCODONE    IR 10 milliGRAM(s) Oral every 4 hours PRN Severe Pain (7 - 10)  oxyCODONE    IR 5 milliGRAM(s) Oral every 4 hours PRN Moderate Pain (4 - 6)      Vital Signs Last 24 Hrs  T(C): 36.5 (16 Jul 2023 07:11), Max: 36.5 (16 Jul 2023 07:11)  T(F): 97.7 (16 Jul 2023 07:11), Max: 97.7 (16 Jul 2023 07:11)  HR: 67 (16 Jul 2023 07:11) (56 - 67)  BP: 114/77 (16 Jul 2023 07:11) (114/77 - 118/78)  RR: 16 (16 Jul 2023 07:11) (16 - 16)  SpO2: 97% (16 Jul 2023 07:11) (95% - 97%)  Parameters below as of 16 Jul 2023 07:11  Patient On (Oxygen Delivery Method): room air      EXAM  In NAD  HEENT- EOMI  Heart- RRR, S1S2  Lungs- CTA bl.  Abd- + BS, NT  Ext- No calf tenderness    Neuro- Exam AAO X 3  Interactive, engaging  Dysarthric with reduced speech rate  Motor function improving                        14.7   8.03  )-----------( 227      ( 15 Jul 2023 06:23 )             45.2     07-15    136  |  102  |  15  ----------------------------<  83  3.7   |  24  |  0.92    Ca    9.1      15 Jul 2023 06:23    TPro  7.1  /  Alb  2.9<L>  /  TBili  1.6<H>  /  DBili  x   /  AST  46<H>  /  ALT  58<H>  /  AlkPhos  122<H>  07-15    CAPILLARY BLOOD GLUCOSE    Urinalysis Basic - ( 15 Jul 2023 06:23 )    Color: x / Appearance: x / SG: x / pH: x  Gluc: 83 mg/dL / Ketone: x  / Bili: x / Urobili: x   Blood: x / Protein: x / Nitrite: x   Leuk Esterase: x / RBC: x / WBC x   Sq Epi: x / Non Sq Epi: x / Bacteria: x      Imp: Patient with diagnosis of Stroke with Left sided weaness admitted for comprehensive acute rehabilitation.    Plan:  - Continue therapies  - DVT prophylaxis--lovenox  - Skin- Turn q2h, check skin daily  - Continue current medications;    - Patient is stable to continue current rehabilitation program.

## 2023-07-16 NOTE — CHART NOTE - NSCHARTNOTEFT_GEN_A_CORE
[facility] Interdisciplinary Plan of Care    REHABILITATION DIAGNOSIS:  Cerebral infarction--Rt MCA infarct     COMORBIDITIES/COMPLICATING CONDITIONS IMPACTING REHABILITATION:  HEALTH ISSUES - PROBLEM Dx:  Hypertension  HLD  Aphasia    PAST MEDICAL & SURGICAL HISTORY:  Osteoarthritis of hip  left      ED (erectile dysfunction)      Lumbar spondylolysis      History of hip surgery  right total hip replacement          Based upon consideration of the patient's impairments, functional status, complicating conditions and any other contributing factors and after information garnered from the assessments of all therapy disciplines involved in treating the patient and other pertinent clinicians:    INTERDISCIPLINARY REHABILITATION INTERVENTIONS:    [ X  ] Transfer Training  [ X  ] Bed Mobility  [ X  ] Therapeutic Exercise  [ X ] Balance/Coordination Exercises  [ X ] Locomotion retraining  [ X  ] Stairs  [  X ] Functional Transfer Training  [   ] Bowel/Bladder program  [   ] Pain Management  [   ] Skin/Wound Care  [   ] Visual/Perceptual Training  [ x  ] Therapeutic Recreation Activities  [   x] Neuromuscular Re-education  [ X  ] Activities of Daily Living  [   ] Speech Exercise  [   ] Swallowing Exercises  [   ] Vital Stim  [   ] Dietary Supplements  [   ] Calorie Count  [   ] Cognitive Exercises  [   ] Congnitive/Linguistic Treatment  [   ] Behavior Program  [   ] Neuropsych Therapy  [ X  ] Patient/Family Counseling  [ X ] Family Training  [ X  ] Community Re-entry  [   ] Orthotic Evaluation  [   ] Prosthetic Eval/Training    MEDICAL PROGNOSIS:  Good     REHAB POTENTIAL:  Good   EXPECTED DAILY THERAPY:         PT: 1 hr        OT: 1 hr        ST: 1 hr        P&O: n/a     EXPECTED INTENSITY OF PROGRAM:  3 hrs / Day    EXPECTED FREQUENCY OF PROGRAM: 5 Days/ Week    ESTIMATED LOS:  [  ] 5-7 Days  [  ] 7-10 Days  [X  ] 10- 14 Days  [  ] 14- 18 Days  [  ] 18- 21 Days    ESTIMATED DISPOSITION:  [  ] Home   [ x ] Home with Outpatient Therapies  [  ] Home with Home Therapies  [  ] Assisted Living  [  ] Nursing Home  [  ] Long Term Acute Care    INTERDISCIPLINARY FUNCTIONAL OUTCOMES/GOALS:         Gait/Mobility: 5       Transfers: 5       ADLs: 5       Functional Transfers: 5       Medication Management:6 6       Communication: 6       Cognitive:       Dysphagia: 5       Bladder 6       Bowel: 6     Functional Independent Measures:   7 = Independent  6 = Modified Independent  5 = Supervision  4 = Minimal Assist/ Contact Guard  3 = Moderate Assistance  2 = Maximum Assistance  1 = Total Assistance  0 = Unable to assess

## 2023-07-17 LAB
ALBUMIN SERPL ELPH-MCNC: 2.9 G/DL — LOW (ref 3.3–5)
ALP SERPL-CCNC: 126 U/L — HIGH (ref 40–120)
ALT FLD-CCNC: 61 U/L — HIGH (ref 10–45)
ANION GAP SERPL CALC-SCNC: 9 MMOL/L — SIGNIFICANT CHANGE UP (ref 5–17)
AST SERPL-CCNC: 46 U/L — HIGH (ref 10–40)
BILIRUB SERPL-MCNC: 1.6 MG/DL — HIGH (ref 0.2–1.2)
BUN SERPL-MCNC: 20 MG/DL — SIGNIFICANT CHANGE UP (ref 7–23)
CALCIUM SERPL-MCNC: 9 MG/DL — SIGNIFICANT CHANGE UP (ref 8.4–10.5)
CHLORIDE SERPL-SCNC: 102 MMOL/L — SIGNIFICANT CHANGE UP (ref 96–108)
CO2 SERPL-SCNC: 25 MMOL/L — SIGNIFICANT CHANGE UP (ref 22–31)
CREAT SERPL-MCNC: 0.85 MG/DL — SIGNIFICANT CHANGE UP (ref 0.5–1.3)
EGFR: 98 ML/MIN/1.73M2 — SIGNIFICANT CHANGE UP
GLUCOSE SERPL-MCNC: 84 MG/DL — SIGNIFICANT CHANGE UP (ref 70–99)
HCT VFR BLD CALC: 45.5 % — SIGNIFICANT CHANGE UP (ref 39–50)
HGB BLD-MCNC: 15.1 G/DL — SIGNIFICANT CHANGE UP (ref 13–17)
MCHC RBC-ENTMCNC: 29.7 PG — SIGNIFICANT CHANGE UP (ref 27–34)
MCHC RBC-ENTMCNC: 33.2 GM/DL — SIGNIFICANT CHANGE UP (ref 32–36)
MCV RBC AUTO: 89.6 FL — SIGNIFICANT CHANGE UP (ref 80–100)
NRBC # BLD: 0 /100 WBCS — SIGNIFICANT CHANGE UP (ref 0–0)
PLATELET # BLD AUTO: 213 K/UL — SIGNIFICANT CHANGE UP (ref 150–400)
POTASSIUM SERPL-MCNC: 3.8 MMOL/L — SIGNIFICANT CHANGE UP (ref 3.5–5.3)
POTASSIUM SERPL-SCNC: 3.8 MMOL/L — SIGNIFICANT CHANGE UP (ref 3.5–5.3)
PROT SERPL-MCNC: 7.2 G/DL — SIGNIFICANT CHANGE UP (ref 6–8.3)
RBC # BLD: 5.08 M/UL — SIGNIFICANT CHANGE UP (ref 4.2–5.8)
RBC # FLD: 12.6 % — SIGNIFICANT CHANGE UP (ref 10.3–14.5)
SODIUM SERPL-SCNC: 136 MMOL/L — SIGNIFICANT CHANGE UP (ref 135–145)
WBC # BLD: 7.54 K/UL — SIGNIFICANT CHANGE UP (ref 3.8–10.5)
WBC # FLD AUTO: 7.54 K/UL — SIGNIFICANT CHANGE UP (ref 3.8–10.5)

## 2023-07-17 PROCEDURE — 99232 SBSQ HOSP IP/OBS MODERATE 35: CPT

## 2023-07-17 PROCEDURE — 99222 1ST HOSP IP/OBS MODERATE 55: CPT

## 2023-07-17 PROCEDURE — 70450 CT HEAD/BRAIN W/O DYE: CPT | Mod: 26

## 2023-07-17 RX ADMIN — Medication 81 MILLIGRAM(S): at 12:35

## 2023-07-17 RX ADMIN — ATORVASTATIN CALCIUM 80 MILLIGRAM(S): 80 TABLET, FILM COATED ORAL at 21:20

## 2023-07-17 RX ADMIN — DIPHENHYDRAMINE HYDROCHLORIDE AND LIDOCAINE HYDROCHLORIDE AND ALUMINUM HYDROXIDE AND MAGNESIUM HYDRO 15 MILLILITER(S): KIT at 17:28

## 2023-07-17 RX ADMIN — Medication 5 MILLILITER(S): at 05:04

## 2023-07-17 RX ADMIN — Medication 1 DROP(S): at 12:35

## 2023-07-17 RX ADMIN — DIPHENHYDRAMINE HYDROCHLORIDE AND LIDOCAINE HYDROCHLORIDE AND ALUMINUM HYDROXIDE AND MAGNESIUM HYDRO 15 MILLILITER(S): KIT at 05:33

## 2023-07-17 RX ADMIN — ENOXAPARIN SODIUM 40 MILLIGRAM(S): 100 INJECTION SUBCUTANEOUS at 17:29

## 2023-07-17 RX ADMIN — Medication 5 MILLILITER(S): at 17:29

## 2023-07-17 RX ADMIN — POLYETHYLENE GLYCOL 3350 17 GRAM(S): 17 POWDER, FOR SOLUTION ORAL at 12:35

## 2023-07-17 RX ADMIN — Medication 1 DROP(S): at 05:04

## 2023-07-17 RX ADMIN — Medication 1 DROP(S): at 17:28

## 2023-07-17 NOTE — DIETITIAN INITIAL EVALUATION ADULT - OTHER INFO
62yo R handed M PMHx recent amyloidosis diagnosis presented to [facility] on 7/10 for dysarthria and L hemiparesis. Patient found to have a R MCA territory infarct without hemorrhagic conversion. Suspected cardioembolic in origin in the setting of amyloidosis vs risk of a fib. Patient recommended to start full dose AC given hypercoagulable state 2/2 amyloidosis. Recommend CTH on 7/17 to access candidacy to start AC. Patient evaluated by PT/OT and recommended for acute inpatient rehab. Patient is medically stable for discharge to [facility] on 7/14.    Pt tolerating regular diet, observed with fair PO intake during meal rounds. Pt reports UBW of 183 lbs. Current weight is 176 lbs, indicating 7#/3.8% unintentional weight loss in <2 weeks. Pt receptive to trial of Ensure Plus High Protein (provides 350 kcal, 20 g protein/serving). Discussed the importance of adequate PO intake during hospitalization. Denies nausea, vomiting, diarrhea, constipation. Pt denies chewing/swallowing difficulties.

## 2023-07-17 NOTE — DIETITIAN INITIAL EVALUATION ADULT - PERTINENT MEDS FT
MEDICATIONS  (STANDING):  artificial tears (preservative free) Ophthalmic Solution 1 Drop(s) Both EYES every 6 hours  aspirin  chewable 81 milliGRAM(s) Oral daily  atorvastatin 80 milliGRAM(s) Oral at bedtime  enoxaparin Injectable 40 milliGRAM(s) SubCutaneous <User Schedule>  FIRST- Mouthwash  BLM 15 milliLiter(s) Swish and Spit two times a day  nystatin    Suspension 5 milliLiter(s) Oral two times a day  polyethylene glycol 3350 17 Gram(s) Oral daily  senna 2 Tablet(s) Oral at bedtime    MEDICATIONS  (PRN):  acetaminophen     Tablet .. 650 milliGRAM(s) Oral every 6 hours PRN Mild Pain (1 - 3)  melatonin 3 milliGRAM(s) Oral at bedtime PRN Insomnia  oxyCODONE    IR 10 milliGRAM(s) Oral every 4 hours PRN Severe Pain (7 - 10)  oxyCODONE    IR 5 milliGRAM(s) Oral every 4 hours PRN Moderate Pain (4 - 6)

## 2023-07-17 NOTE — CONSULT NOTE ADULT - MOTOR
3/5 triceps weakness on the right, and 4/5 on the left.  4/5  strength bilaterally  remainder of strength testing is 5/5

## 2023-07-17 NOTE — DIETITIAN INITIAL EVALUATION ADULT - ADD RECOMMEND
-pt at risk for malnutrition due to unintentional weight loss + prior hospitalization, recommend monitoring weight daily  -encourage PO intake  -obtain and honor food preferences to optimize PO intake

## 2023-07-17 NOTE — DIETITIAN INITIAL EVALUATION ADULT - PERTINENT LABORATORY DATA
07-17    136  |  102  |  20  ----------------------------<  84  3.8   |  25  |  0.85    Ca    9.0      17 Jul 2023 05:45    TPro  7.2  /  Alb  2.9<L>  /  TBili  1.6<H>  /  DBili  x   /  AST  46<H>  /  ALT  61<H>  /  AlkPhos  126<H>  07-17

## 2023-07-17 NOTE — PROGRESS NOTE ADULT - SUBJECTIVE AND OBJECTIVE BOX
Subjective  Seen and examined,  Daughter present at bed side  Reports no acute medical complaint    Slept well, tolerating oral diet, no difficulty swallowing    ROS-no head ache, chest or abd pain, no nausea or vomiting  LBM 7/17    Therapy--  Reports that he is enjoying this  Motor function significantly improved  Working on co ordination, endurance and progressive ambulation    ICU Vital Signs Last 24 Hrs  T(C): 36.2 (17 Jul 2023 09:43), Max: 36.6 (16 Jul 2023 21:19)  T(F): 97.2 (17 Jul 2023 09:43), Max: 97.8 (16 Jul 2023 21:19)  HR: 80 (17 Jul 2023 09:43) (80 - 82)  BP: 129/70 (17 Jul 2023 09:43) (129/70 - 130/82)  RR: 16 (17 Jul 2023 09:43) (16 - 16)  SpO2: 96% (17 Jul 2023 09:43) (96% - 98%)  O2 Parameters below as of 17 Jul 2023 09:43  Patient On (Oxygen Delivery Method): room air      EXAM  Gen--Comfortable  HEENT - NAD  Neck - Supple, No limited ROM  Pulm - vesicular sounds   Cardiovascular - RRR, S1S2  Chest - normal heart sounds  Abdomen - Soft, non tender , +BS  Extremities - No Cyanosis, no clubbing, no edema, no calf tenderness      Neuro   Fully oriented      Communication - + dysarthria, hypophonia     Judgement: Good evidence of judgement     Memory: Recall 3 objects immediate and 3 min later     Cranial Nerves -  left visual field deficit , mild L facial palsy      Motor -                     LEFT    UE - ShAB 3/5                    RIGHT UE - ShAB 5/5, EF 5/5, EE 5/5,   5/5                    LEFT    LE - 3/5                    RIGHT LE - HF 5/5, KE 5/5, DF 5/5, PF 5/5        Sensory - Intact  to LT on right/left side or bilaterally     Reflexes - DTR Intact, No primitive reflexive     Coordination - FTN intact but slower to left hand     Tone - normal  MSK: Decreased ROM to right shoulder 2/2 osteoarthritis, mild left pronator drift.  Psychiatric - Mood stable, Affect WNL  Skin:  all skin intac    RECENT LABS/IMAGING                        15.1   7.54  )-----------( 213      ( 17 Jul 2023 05:45 )             45.5     07-17    136  |  102  |  20  ----------------------------<  84  3.8   |  25  |  0.85    Ca    9.0      17 Jul 2023 05:45    TPro  7.2  /  Alb  2.9<L>  /  TBili  1.6<H>  /  DBili  x   /  AST  46<H>  /  ALT  61<H>  /  AlkPhos  126<H>  07-17      Urinalysis Basic - ( 17 Jul 2023 05:45 )    Color: x / Appearance: x / SG: x / pH: x  Gluc: 84 mg/dL / Ketone: x  / Bili: x / Urobili: x   Blood: x / Protein: x / Nitrite: x   Leuk Esterase: x / RBC: x / WBC x   Sq Epi: x / Non Sq Epi: x / Bacteria: x    MEDICATIONS  (STANDING):  artificial tears (preservative free) Ophthalmic Solution 1 Drop(s) Both EYES every 6 hours  aspirin  chewable 81 milliGRAM(s) Oral daily  atorvastatin 80 milliGRAM(s) Oral at bedtime  enoxaparin Injectable 40 milliGRAM(s) SubCutaneous <User Schedule>  FIRST- Mouthwash  BLM 15 milliLiter(s) Swish and Spit two times a day  nystatin    Suspension 5 milliLiter(s) Oral two times a day  polyethylene glycol 3350 17 Gram(s) Oral daily  senna 2 Tablet(s) Oral at bedtime    MEDICATIONS  (PRN):  acetaminophen     Tablet .. 650 milliGRAM(s) Oral every 6 hours PRN Mild Pain (1 - 3)  melatonin 3 milliGRAM(s) Oral at bedtime PRN Insomnia  oxyCODONE    IR 5 milliGRAM(s) Oral every 4 hours PRN Moderate Pain (4 - 6)  oxyCODONE    IR 10 milliGRAM(s) Oral every 4 hours PRN Severe Pain (7 - 10)    PROCEDURE DATE:  07/17/2023          INTERPRETATION:  Noncontrast CT of the brain.    CLINICAL INDICATION:  Follow-up right MCA stroke    TECHNIQUE : Axial CT scanning of the brain was obtained from the skull   base to the vertex without the administration of intravenous contrast.   Sagittal and coronal reformats were provided.    COMPARISON: CT brain 7/11/2023. MRI brain 7/13/2023    FINDINGS:    Redemonstration of tube right MCAterritory infarct. Redemonstration of   petechial hemorrhagic transformation involving the right parietotemporal   region.    No hydrocephalus, midline shift, or effacement of the basal cisterns.    IMPRESSION:    Redemonstration of tube right MCA territory infarct. Redemonstration of   petechial hemorrhagic transformation involving the right parietotemporal   region.    No hydrocephalus, midline shift, or effacement of the basal cisterns.

## 2023-07-17 NOTE — PROGRESS NOTE ADULT - ASSESSMENT
Assessment/Plan:  WILFRIDO IRVIN is a 63y PMHx recent amyloidosis diagnosis presented to [facility] on 7/10 for dysarthria and L hemiparesis. Patient found to have a R MCA territory infarct without hemorrhagic conversion. Patient now admitted for a multidisciplinary rehab program. 07-14-23 @ 13:49    * Await [facility] to determine full AC candidacy  * Monitor mood and get neuropsych consult if decreased engagement and low mood persisting   * Focus on balance and progressive walking in therapies     R MCA CVA with left Hemiparesis   - ASA 81mg qd  - Lipitor 80mg qd  - Patient recommended to start full dose AC given hypercoagulable state 2/2 amyloidosis. Recommend [facility] on 7/17 to access candidacy to start AC.  - Precautions: Aspiration, fall  - Start comprehensive rehab program of PT/OT/SLP - 3 hours a day, 5 days a week. P&O as needed     Dysphagia  - Soft and bite sized diet with thins  - SLP consult  - Bedside swallow eval    Amylodosis  - Recently dx  - Patient recommended to start full dose AC given hypercoagulable state 2/2 amyloidosis. Recommend CT on 7/17 to access candidacy to start AC.    HLD  - cont statin    Pain  - Tylenol/oxycodone PRN    Sleep  - Melatonin PRN     GI / Bowel  - Senna qHS  - Miralax PRN Daily     / Bladder--voiding appropriately     Skin / Pressure injury  - Skin assessment on admission performed : Intact  - Turn q2 hours in bed while awake, air mattress  - nursing to monitor skin q Shift  - soft heel protectors  - skin barrier cream PRN    Diet/Dysphagia:  - Diet Consistency: Soft and bite sized  - Aspiration Precautions  - SLP consult for swallow function evaluation and treatment  - Nutrition consult    DVT prophylaxis:   - Lovenox  ---------------  Outpatient Follow-up:    Sarah Damian  NP in [facility]  [address], [address]  [place] [address]  Phone: [phone]  Fax: [phone]  Follow Up Time: 2 weeks    Ayush Romero  Cardiology  [address], [address]  [place] [address]  Phone: [phone]  Fax: [phone]  Follow Up Time: 2 weeks  ---------------   Assessment/Plan:  WILFRIDO IRVIN is a 63y PMHx recent amyloidosis diagnosis presented to [facility] on 7/10 for dysarthria and L hemiparesis. Patient found to have a R MCA territory infarct without hemorrhagic conversion. Patient now admitted for a multidisciplinary rehab program. 07-14-23 @ 13:49    *CTH noted   * Await Neurology consult   * Monitor mood and get neuropsych consult if decreased engagement and low mood persisting   * Focus on balance and progressive walking in therapies     R MCA CVA with left Hemiparesis   - ASA 81mg qd  - Lipitor 80mg qd  - Patient recommended to start full dose AC given hypercoagulable state 2/2 amyloidosis. Recommend [facility] on 7/17 to access candidacy to start AC.  - Precautions: Aspiration, fall  - Start comprehensive rehab program of PT/OT/SLP - 3 hours a day, 5 days a week. P&O as needed     Dysphagia  - Soft and bite sized diet with thins  - SLP consult  - Bedside swallow eval    Amylodosis--Recent Dx  --CTH 7/17, no interval change  --Neurlogy consulted to evaluate need for anticoagulation, considering hx of amyloidosis as recommended by neurology during acute care  HLD  - cont statin    Pain  - Tylenol/oxycodone PRN    Sleep  - Melatonin PRN     GI / Bowel  - Senna qHS  - Miralax PRN Daily     / Bladder--voiding appropriately     Skin / Pressure injury  - Skin assessment on admission performed : Intact  - Turn q2 hours in bed while awake, air mattress  - nursing to monitor skin q Shift  - soft heel protectors  - skin barrier cream PRN    Diet/Dysphagia:  - Diet Consistency: Soft and bite sized  - Aspiration Precautions  - SLP consult for swallow function evaluation and treatment  - Nutrition consult    DVT prophylaxis:   - Lovenox  7/17--Patient's daughter asked questions on patient's care, which were all answers including anticipated duration of treatment, expectations regarding functional recovery   We discussed findings from review, plan for neurology consult to evaluate need for anticoagulation. She was happy with the explanation  She also gave family background information and support network available to patient post discharge   ---------------  Outpatient Follow-up:    Sarah Damian  NP in [address], [address]  [place] [address]  Phone: [phone]  Fax: [phone]  Follow Up Time: 2 weeks    Ayush Romero  Cardiology  [address] [facility] [facility] [facility] [address]  [place] [address]  Phone: [phone]  Fax: [phone]  Follow Up Time: 2 weeks  ---------------

## 2023-07-18 PROCEDURE — 99232 SBSQ HOSP IP/OBS MODERATE 35: CPT

## 2023-07-18 RX ORDER — APIXABAN 2.5 MG/1
5 TABLET, FILM COATED ORAL
Refills: 0 | Status: DISCONTINUED | OUTPATIENT
Start: 2023-07-18 | End: 2023-07-21

## 2023-07-18 RX ORDER — OXYCODONE HYDROCHLORIDE 5 MG/1
5 TABLET ORAL EVERY 4 HOURS
Refills: 0 | Status: DISCONTINUED | OUTPATIENT
Start: 2023-07-18 | End: 2023-07-21

## 2023-07-18 RX ORDER — DAPAGLIFLOZIN 10 MG/1
10 TABLET, FILM COATED ORAL EVERY 24 HOURS
Refills: 0 | Status: DISCONTINUED | OUTPATIENT
Start: 2023-07-18 | End: 2023-07-21

## 2023-07-18 RX ORDER — OXYCODONE HYDROCHLORIDE 5 MG/1
10 TABLET ORAL EVERY 4 HOURS
Refills: 0 | Status: DISCONTINUED | OUTPATIENT
Start: 2023-07-18 | End: 2023-07-21

## 2023-07-18 RX ADMIN — ATORVASTATIN CALCIUM 80 MILLIGRAM(S): 80 TABLET, FILM COATED ORAL at 20:58

## 2023-07-18 RX ADMIN — Medication 5 MILLILITER(S): at 17:48

## 2023-07-18 RX ADMIN — Medication 81 MILLIGRAM(S): at 11:30

## 2023-07-18 RX ADMIN — DAPAGLIFLOZIN 10 MILLIGRAM(S): 10 TABLET, FILM COATED ORAL at 17:48

## 2023-07-18 RX ADMIN — DIPHENHYDRAMINE HYDROCHLORIDE AND LIDOCAINE HYDROCHLORIDE AND ALUMINUM HYDROXIDE AND MAGNESIUM HYDRO 15 MILLILITER(S): KIT at 05:06

## 2023-07-18 RX ADMIN — Medication 1 DROP(S): at 05:06

## 2023-07-18 RX ADMIN — Medication 1 DROP(S): at 11:30

## 2023-07-18 RX ADMIN — Medication 5 MILLILITER(S): at 05:06

## 2023-07-18 RX ADMIN — APIXABAN 5 MILLIGRAM(S): 2.5 TABLET, FILM COATED ORAL at 17:48

## 2023-07-18 RX ADMIN — DIPHENHYDRAMINE HYDROCHLORIDE AND LIDOCAINE HYDROCHLORIDE AND ALUMINUM HYDROXIDE AND MAGNESIUM HYDRO 15 MILLILITER(S): KIT at 17:48

## 2023-07-18 RX ADMIN — Medication 1 DROP(S): at 17:51

## 2023-07-18 NOTE — PROGRESS NOTE ADULT - ASSESSMENT
Assessment/Plan:  WILFRIDO IRVIN is a 63y PMHx recent amyloidosis diagnosis presented to [facility] on 7/10 for dysarthria and L hemiparesis. Patient found to have a R MCA territory infarct without hemorrhagic conversion. Patient now admitted for a multidisciplinary rehab program. 07-14-23 @ 13:49    * Resume Eliquis - repeat CTH 7/19 AM (12 hours later)   * Focus on balance and progressive walking in therapies   * Continue to monitor rehab progress     R MCA CVA with left Hemiparesis   - ASA 81mg qd  - Lipitor 80mg qd  - Patient recommended to start full dose AC given hypercoagulable state 2/2 amyloidosis. Recommend [facility]TH on 7/17 to access candidacy to start AC.  - Precautions: Aspiration, fall  - Start comprehensive rehab program of PT/OT/SLP - 3 hours a day, 5 days a week. P&O as needed     Dysphagia  - Soft and bite sized diet with thins  - SLP consult  - Bedside swallow eval    Amylodosis--Recent Dx  --CTH 7/17, no interval change  - Eliquis resumed on 7/18    HLD  - cont statin    Pain  - Tylenol/oxycodone PRN    Sleep  - Melatonin PRN     GI / Bowel  - Senna qHS  - Miralax PRN Daily     / Bladder--voiding appropriately     Skin / Pressure injury  - Skin assessment on admission performed : Intact  - Turn q2 hours in bed while awake, air mattress  - nursing to monitor skin q Shift  - soft heel protectors  - skin barrier cream PRN    Diet/Dysphagia:  - Diet Consistency: Soft and bite sized  - Aspiration Precautions  - SLP consult for swallow function evaluation and treatment  - Nutrition consult    DVT prophylaxis:   - Lovenox  ---------------  Dr. Jain's Liason with family/providers:    7/17--Patient's daughter asked questions on patient's care, which were all answers including anticipated duration of treatment, expectations regarding functional recovery   We discussed findings from review, plan for neurology consult to evaluate need for anticoagulation. She was happy with the explanation  She also gave family background information and support network available to patient post discharge   ---------------  Outpatient Follow-up:    Sarah Damian  NP in [address], [address]  [place] [address]  Phone: [phone]  Fax: [phone]  Follow Up Time: 2 weeks    Ayush Romero  Cardiology  [facility] [facility]  [place] [address]  Phone: [phone]  Fax: [phone]  Follow Up Time: 2 weeks  --------------- Assessment/Plan:  WILFRIDO IRVIN is a 63y PMHx recent amyloidosis diagnosis presented to [facility] on 7/10 for dysarthria and L hemiparesis. Patient found to have a R MCA territory infarct without hemorrhagic conversion. Patient now admitted for a multidisciplinary rehab program. 07-14-23 @ 13:49    * Resume Eliquis - repeat CTH 7/19 AM (12 hours later)   * Focus on balance and progressive walking in therapies   * Continue to monitor rehab progress     R MCA CVA with left Hemiparesis   - ASA 81mg qd  - Lipitor 80mg qd  - Patient recommended to start full dose AC given hypercoagulable state 2/2 amyloidosis. Recommend CTH on 7/17 to access candidacy to start AC.  - Precautions: Aspiration, fall  - Continue comprehensive rehab program of PT/OT/SLP - 3 hours a day, 5 days a week. P&O as needed     Dysphagia  - Soft and bite sized diet with thins  - SLP consult  - Bedside swallow eval    Amylodosis--Recent Dx  --CTH 7/17, no interval change  --Neurologist Dr France, no objection to commencing AC as recs by cardiology  - Eliquis resumed on 7/18    HLD  - cont statin    Pain  - Tylenol/oxycodone PRN    Sleep  - Melatonin PRN     GI / Bowel  - Senna qHS  - Miralax PRN Daily     / Bladder--voiding appropriately     Skin / Pressure injury--unremarkable    Diet/Dysphagia:  - Diet Consistency: Soft and bite sized  - Aspiration Precautions  - SLP consult for swallow function evaluation and treatment  - Nutrition consult    DVT prophylaxis:   - Lovenox  ---------------  Dr. Jain's Liason with family/providers:    7/17--Patient's daughter asked questions on patient's care, which were all answers including anticipated duration of treatment, expectations regarding functional recovery   We discussed findings from review, plan for neurology consult to evaluate need for anticoagulation. She was happy with the explanation  She also gave family background information and support network available to patient post discharge     7/18--Liaison with other providers  Dr Sebastian, Neurology--no objection to commencing anticoagulation as recs by cardiology  Hx of amyloidosis     ---------------  Outpatient Follow-up:    Sarah Damian  NP in [address], [address]  [place] [address]  Phone: [phone]  Fax: [phone]  Follow Up Time: 2 weeks    Ayush Romero  Cardiology  [address] [facility] [address] [address] [facility] [facility] [address]  [place] [address]  Phone: [phone]  Fax: [phone]  Follow Up Time: 2 weeks  ---------------

## 2023-07-18 NOTE — PROGRESS NOTE ADULT - SUBJECTIVE AND OBJECTIVE BOX
Medicine Progress Note    Patient is a 63y old  Male who presents with a chief complaint of CVA (18 Jul 2023 10:54)      SUBJECTIVE / OVERNIGHT EVENTS:  no complaints    ADDITIONAL REVIEW OF SYSTEMS:  denied fever/chills/CP/SOB/cough/palpitation/dizziness/abdominal pian/nausea/vomiting/diarrhoea/constipation/dysuria/leg or calf pain/headaches.all other ROS neg    MEDICATIONS  (STANDING):  apixaban 5 milliGRAM(s) Oral two times a day  artificial tears (preservative free) Ophthalmic Solution 1 Drop(s) Both EYES every 6 hours  aspirin  chewable 81 milliGRAM(s) Oral daily  atorvastatin 80 milliGRAM(s) Oral at bedtime  dapagliflozin 10 milliGRAM(s) Oral every 24 hours  FIRST- Mouthwash  BLM 15 milliLiter(s) Swish and Spit two times a day  nystatin    Suspension 5 milliLiter(s) Oral two times a day  polyethylene glycol 3350 17 Gram(s) Oral daily  senna 2 Tablet(s) Oral at bedtime    MEDICATIONS  (PRN):  acetaminophen     Tablet .. 650 milliGRAM(s) Oral every 6 hours PRN Mild Pain (1 - 3)  melatonin 3 milliGRAM(s) Oral at bedtime PRN Insomnia  oxyCODONE    IR 5 milliGRAM(s) Oral every 4 hours PRN Moderate Pain (4 - 6)  oxyCODONE    IR 10 milliGRAM(s) Oral every 4 hours PRN Severe Pain (7 - 10)    CAPILLARY BLOOD GLUCOSE        I&O's Summary    17 Jul 2023 07:01  -  18 Jul 2023 07:00  --------------------------------------------------------  IN: 0 mL / OUT: 1 mL / NET: -1 mL        PHYSICAL EXAM:  Vital Signs Last 24 Hrs  T(C): 37 (18 Jul 2023 08:29), Max: 37 (18 Jul 2023 08:29)  T(F): 98.6 (18 Jul 2023 08:29), Max: 98.6 (18 Jul 2023 08:29)  HR: 69 (18 Jul 2023 08:29) (65 - 69)  BP: 105/65 (18 Jul 2023 08:29) (105/65 - 123/65)  BP(mean): --  RR: 16 (18 Jul 2023 08:29) (16 - 16)  SpO2: 95% (18 Jul 2023 08:29) (95% - 95%)    Parameters below as of 18 Jul 2023 08:29  Patient On (Oxygen Delivery Method): room air    GENERAL: Not in distress. Alert    HEENT: clear conjuctiva, MMM. no pallor or icterus  CARDIOVASCULAR: RRR S1, S2. No murmur/rubs/gallop  LUNGS: BLAE+, no rales, no wheezing, no rhonchi.    ABDOMEN: ND. Soft,  NT, no guarding / rebound / rigidity. BS normoactive  BACK: No spine tenderness.  EXTREMITIES: no edema. no leg or calf TP.  SKIN: warm and dry  PSYCHIATRIC: Calm.  No agitation.  CNS: AAO. moves limbs, follows commands. left sided weakness    LABS:                        15.1   7.54  )-----------( 213      ( 17 Jul 2023 05:45 )             45.5     07-17    136  |  102  |  20  ----------------------------<  84  3.8   |  25  |  0.85    Ca    9.0      17 Jul 2023 05:45    TPro  7.2  /  Alb  2.9<L>  /  TBili  1.6<H>  /  DBili  x   /  AST  46<H>  /  ALT  61<H>  /  AlkPhos  126<H>  07-17        Urinalysis Basic - ( 17 Jul 2023 05:45 )    Color: x / Appearance: x / SG: x / pH: x  Gluc: 84 mg/dL / Ketone: x  / Bili: x / Urobili: x   Blood: x / Protein: x / Nitrite: x   Leuk Esterase: x / RBC: x / WBC x   Sq Epi: x / Non Sq Epi: x / Bacteria: x    RADIOLOGY & ADDITIONAL TESTS:    < from: TTE W or WO Ultrasound Enhancing Agent (07.11.23 @ 06:33) >  CONCLUSIONS:      1. Technically difficult image quality.   2. Mild left ventricular hypertrophy.   3. There is increased LV mass and concentric hypertrophy.   4. The left ventricular systolic function is moderately decreased with an ejection fraction of 36 % by Harrington's method of disks. There is global left ventricular hypokinesis. No evidence of a thrombus in the left ventricle.   5. There is severe (grade 3) left ventricular diastolic dysfunction.   6. Moderate-severely enlarged right ventricular cavity size, normal right ventricular wall thickness and reduced systolic right ventricular function.   7. Mild to moderate mitral regurgitation.   8. Trileaflet aortic valve with normal systolic excursion.   9. Trace aortic regurgitation.  10. The right atrium is severely dilated.  11. Estimated pulmonary artery systolic pressure is 41 mmHg, consistent with mild pulmonary hypertension.  12. No evidence of a patent foramen ovale by color flow Doppler.  13. Agitated saline injection was negative for intracardiac shunt.  14. No prior echocardiogram is available for comparison.    < end of copied text >    Imaging from Last 24 Hours:    Electrocardiogram/QTc Interval:    COORDINATION OF CARE:  Care Discussed with Consultants/Other Providers:

## 2023-07-18 NOTE — PROGRESS NOTE ADULT - ASSESSMENT
62 yo M with no PMH who presented to [facility] 7/10 with dysarthria and L hemiparesis; found to have R MCA infarct. Suspected to be cardioembolic in the setting of amyloidosis vs risk of A-fib. Now admitted to [facility] for rehab.     R MCA CVA  - Continue with ASA, statin  - CTH 7/17 showed petechial hge.  -  neuro consulted. suggested  to resume Eliquis, repeat CTH on 12 hrs to reassess hge  - PT/OT/ST, pain and bowel regimen per PMR    Amyloidosis   CM  - TTE: EF 36% ON jULY 11. Mild pulmonary Hypertension.   - son reported to rehab team that Patient was lasix and Farxiga at home. [facility] chart reviewed. both meds held there. Cardio cx at [facility] noted  - resumed Farxiga today 7/19. will continue to hold lasix as patient is euvolemic   - unclear why patient is not on other GDMT. [ Metoprolol ER or Coreg, MRA, ACEI/ARB/ARNI. can start OP  - if patient needs anti-HTN, will start coreg which will cover systolic dysfunction  - outpatient cardiac follow up in 1-2 week post-DC    Hyperlipidemia   - statin    DVT Prophylaxis  - Lovenox     d/w rehab team at [facility]

## 2023-07-18 NOTE — PROGRESS NOTE ADULT - SUBJECTIVE AND OBJECTIVE BOX
Subjective  Patient seen and examined at bedside with Dr. AGUILAR.  Daughter present at bed side  Admits to sleeping well.  Last BM on 7/17, per patient.  Discussed resumption of Eliquis with [facility] 12 hours later.   No other complaints at this time.    Denies headache, dizziness, visual changes, chest pain, SOB/TOWNSEND, abdominal pain, nausea, vomiting, diarrhea, dysuria, numbness or tingling.       Therapy--  Reports that he is enjoying this  Motor function significantly improved  Working on co ordination, endurance and progressive ambulation      Vital Signs Last 24 Hrs  T(C): 37 (18 Jul 2023 08:29), Max: 37 (18 Jul 2023 08:29)  T(F): 98.6 (18 Jul 2023 08:29), Max: 98.6 (18 Jul 2023 08:29)  HR: 69 (18 Jul 2023 08:29) (65 - 69)  BP: 105/65 (18 Jul 2023 08:29) (105/65 - 123/65)  BP(mean): --  RR: 16 (18 Jul 2023 08:29) (16 - 16)  SpO2: 95% (18 Jul 2023 08:29) (95% - 95%)air      EXAM  Gen--Comfortable  HEENT - NAD  Neck - Supple, No limited ROM  Pulm - vesicular sounds   Cardiovascular - RRR, S1S2  Chest - normal heart sounds  Abdomen - Soft, non tender , +BS  Extremities - No Cyanosis, no clubbing, no edema, no calf tenderness      Neuro   Fully oriented      Communication - + dysarthria, hypophonia     Judgement: Good evidence of judgement     Memory: Recall 3 objects immediate and 3 min later     Cranial Nerves -  left visual field deficit , mild L facial palsy      Motor -                     LEFT    UE - ShAB 3/5                    RIGHT UE - ShAB 5/5, EF 5/5, EE 5/5,   5/5                    LEFT    LE - 3/5                    RIGHT LE - HF 5/5, KE 5/5, DF 5/5, PF 5/5        Sensory - Intact  to LT on right/left side or bilaterally     Reflexes - DTR Intact, No primitive reflexive     Coordination - FTN intact but slower to left hand     Tone - normal  MSK: Decreased ROM to right shoulder 2/2 osteoarthritis, mild left pronator drift.  Psychiatric - Mood stable, Affect WNL  Skin:  all skin intac      RECENT LABS/IMAGING                        15.1   7.54  )-----------( 213      ( 17 Jul 2023 05:45 )             45.5     07-17    136  |  102  |  20  ----------------------------<  84  3.8   |  25  |  0.85    Ca    9.0      17 Jul 2023 05:45    TPro  7.2  /  Alb  2.9<L>  /  TBili  1.6<H>  /  DBili  x   /  AST  46<H>  /  ALT  61<H>  /  AlkPhos  126<H>  07-17      Urinalysis Basic - ( 17 Jul 2023 05:45 )    Color: x / Appearance: x / SG: x / pH: x  Gluc: 84 mg/dL / Ketone: x  / Bili: x / Urobili: x   Blood: x / Protein: x / Nitrite: x   Leuk Esterase: x / RBC: x / WBC x   Sq Epi: x / Non Sq Epi: x / Bacteria: x      CT (7/17)   IMPRESSION:  Redemonstration of tube right MCA territory infarct. Redemonstration of petechial hemorrhagic transformation involving the right parietotemporal region.    No hydrocephalus, midline shift, or effacement of the basal cisterns.      MEDICATIONS  (STANDING):  apixaban 5 milliGRAM(s) Oral two times a day  artificial tears (preservative free) Ophthalmic Solution 1 Drop(s) Both EYES every 6 hours  aspirin  chewable 81 milliGRAM(s) Oral daily  atorvastatin 80 milliGRAM(s) Oral at bedtime  dapagliflozin 10 milliGRAM(s) Oral every 24 hours  FIRST- Mouthwash  BLM 15 milliLiter(s) Swish and Spit two times a day  nystatin    Suspension 5 milliLiter(s) Oral two times a day  polyethylene glycol 3350 17 Gram(s) Oral daily  senna 2 Tablet(s) Oral at bedtime    MEDICATIONS  (PRN):  acetaminophen     Tablet .. 650 milliGRAM(s) Oral every 6 hours PRN Mild Pain (1 - 3)  melatonin 3 milliGRAM(s) Oral at bedtime PRN Insomnia  oxyCODONE    IR 10 milliGRAM(s) Oral every 4 hours PRN Severe Pain (7 - 10)  oxyCODONE    IR 5 milliGRAM(s) Oral every 4 hours PRN Moderate Pain (4 - 6)       Subjective  Patient seen and examined at bedside with Dr. Jain  Daughter present at bed side  Admits to sleeping well.  Last BM on 7/17, per patient.  Discussed resumption of Eliquis with [facility] 12 hours later. he ageed to this   No other complaints at this time.    Denies headache, dizziness, visual changes, chest pain, SOB/TOWNSEND, abdominal pain, nausea, vomiting, diarrhea, dysuria, numbness or tingling.       Therapy--  Motor function sustained improvement  Working on co ordination, endurance and progressive ambulation    Interval neurology consult to confirm if happy with commencement of AC, hx of amyloid angiopathy  Dr Sebastian, neurologist, noted that cardiology had recommended this for cardiac reasons and neurology has no objection to this       Vital Signs Last 24 Hrs  T(C): 37 (18 Jul 2023 08:29), Max: 37 (18 Jul 2023 08:29)  T(F): 98.6 (18 Jul 2023 08:29), Max: 98.6 (18 Jul 2023 08:29)  HR: 69 (18 Jul 2023 08:29) (65 - 69)  BP: 105/65 (18 Jul 2023 08:29) (105/65 - 123/65)  RR: 16 (18 Jul 2023 08:29) (16 - 16)  SpO2: 95% (18 Jul 2023 08:29) (95% - 95%)air      EXAM  Gen--Comfortable  HEENT - supple  Neck - Supple, No limited ROM  Pulm - clear   Cardiovascular - RRR, S1S2  Chest - normal heart sounds  Abdomen - Soft, non tender , +BS  Extremities - No Cyanosis, no clubbing, no edema, no calf tenderness      Neuro   Fully oriented      Communication - + dysarthria, hypophonia     Judgement: Good evidence of judgement     Memory: Recall 3 objects immediate and 3 min later     Cranial Nerves -  left visual field deficit , mild L facial palsy      Motor -                     LEFT    UE - ShAB 4/5                    RIGHT UE - ShAB 5/5, EF 5/5, EE 5/5,   5/5                    LEFT    LE - 4/5                    RIGHT LE - HF 5/5, KE 5/5, DF 5/5, PF 5/5        Sensory - Intact  to LT on right/left side or bilaterally     Reflexes - DTR Intact, No primitive reflexive     Coordination - FTN intact but slower to left hand     Tone - normal  MSK: Decreased ROM to right shoulder 2/2 osteoarthritis, mild left pronator drift.  Psychiatric - Mood stable, Affect WNL  Skin:  all skin intac      RECENT LABS/IMAGING                        15.1   7.54  )-----------( 213      ( 17 Jul 2023 05:45 )             45.5     07-17    136  |  102  |  20  ----------------------------<  84  3.8   |  25  |  0.85    Ca    9.0      17 Jul 2023 05:45    TPro  7.2  /  Alb  2.9<L>  /  TBili  1.6<H>  /  DBili  x   /  AST  46<H>  /  ALT  61<H>  /  AlkPhos  126<H>  07-17      Urinalysis Basic - ( 17 Jul 2023 05:45 )    Color: x / Appearance: x / SG: x / pH: x  Gluc: 84 mg/dL / Ketone: x  / Bili: x / Urobili: x   Blood: x / Protein: x / Nitrite: x   Leuk Esterase: x / RBC: x / WBC x   Sq Epi: x / Non Sq Epi: x / Bacteria: x      [facility] (7/17)   IMPRESSION:  Redemonstration of tube right MCA territory infarct. Redemonstration of petechial hemorrhagic transformation involving the right parietotemporal region.    No hydrocephalus, midline shift, or effacement of the basal cisterns.      MEDICATIONS  (STANDING):  apixaban 5 milliGRAM(s) Oral two times a day  artificial tears (preservative free) Ophthalmic Solution 1 Drop(s) Both EYES every 6 hours  aspirin  chewable 81 milliGRAM(s) Oral daily  atorvastatin 80 milliGRAM(s) Oral at bedtime  dapagliflozin 10 milliGRAM(s) Oral every 24 hours  FIRST- Mouthwash  BLM 15 milliLiter(s) Swish and Spit two times a day  nystatin    Suspension 5 milliLiter(s) Oral two times a day  polyethylene glycol 3350 17 Gram(s) Oral daily  senna 2 Tablet(s) Oral at bedtime    MEDICATIONS  (PRN):  acetaminophen     Tablet .. 650 milliGRAM(s) Oral every 6 hours PRN Mild Pain (1 - 3)  melatonin 3 milliGRAM(s) Oral at bedtime PRN Insomnia  oxyCODONE    IR 10 milliGRAM(s) Oral every 4 hours PRN Severe Pain (7 - 10)  oxyCODONE    IR 5 milliGRAM(s) Oral every 4 hours PRN Moderate Pain (4 - 6)

## 2023-07-19 ENCOUNTER — TRANSCRIPTION ENCOUNTER (OUTPATIENT)
Age: 63
End: 2023-07-19

## 2023-07-19 ENCOUNTER — NON-APPOINTMENT (OUTPATIENT)
Age: 63
End: 2023-07-19

## 2023-07-19 PROCEDURE — 70450 CT HEAD/BRAIN W/O DYE: CPT | Mod: 26

## 2023-07-19 PROCEDURE — 99232 SBSQ HOSP IP/OBS MODERATE 35: CPT

## 2023-07-19 RX ORDER — DIPHENHYDRAMINE HYDROCHLORIDE AND LIDOCAINE HYDROCHLORIDE AND ALUMINUM HYDROXIDE AND MAGNESIUM HYDRO
5 KIT
Qty: 60 | Refills: 0
Start: 2023-07-19 | End: 2023-08-17

## 2023-07-19 RX ORDER — APIXABAN 2.5 MG/1
1 TABLET, FILM COATED ORAL
Qty: 60 | Refills: 0
Start: 2023-07-19 | End: 2023-08-17

## 2023-07-19 RX ORDER — PANTOPRAZOLE SODIUM 20 MG/1
1 TABLET, DELAYED RELEASE ORAL
Qty: 30 | Refills: 0
Start: 2023-07-19 | End: 2023-08-17

## 2023-07-19 RX ORDER — DAPAGLIFLOZIN 10 MG/1
1 TABLET, FILM COATED ORAL
Qty: 30 | Refills: 0
Start: 2023-07-19 | End: 2023-08-17

## 2023-07-19 RX ORDER — LANOLIN ALCOHOL/MO/W.PET/CERES
1 CREAM (GRAM) TOPICAL
Qty: 0 | Refills: 0 | DISCHARGE
Start: 2023-07-19

## 2023-07-19 RX ORDER — ATORVASTATIN CALCIUM 80 MG/1
1 TABLET, FILM COATED ORAL
Qty: 30 | Refills: 0
Start: 2023-07-19 | End: 2023-08-17

## 2023-07-19 RX ORDER — FUROSEMIDE 40 MG
2 TABLET ORAL
Refills: 0 | DISCHARGE

## 2023-07-19 RX ORDER — ACETAMINOPHEN 500 MG
2 TABLET ORAL
Qty: 0 | Refills: 0 | DISCHARGE
Start: 2023-07-19

## 2023-07-19 RX ORDER — ASPIRIN/CALCIUM CARB/MAGNESIUM 324 MG
1 TABLET ORAL
Qty: 0 | Refills: 0 | DISCHARGE
Start: 2023-07-19

## 2023-07-19 RX ORDER — NYSTATIN 500MM UNIT
5 POWDER (EA) MISCELLANEOUS
Qty: 300 | Refills: 0
Start: 2023-07-19 | End: 2023-08-17

## 2023-07-19 RX ORDER — SENNA PLUS 8.6 MG/1
2 TABLET ORAL
Qty: 0 | Refills: 0 | DISCHARGE
Start: 2023-07-19

## 2023-07-19 RX ORDER — POLYETHYLENE GLYCOL 3350 17 G/17G
17 POWDER, FOR SOLUTION ORAL
Qty: 0 | Refills: 0 | DISCHARGE
Start: 2023-07-19

## 2023-07-19 RX ADMIN — Medication 81 MILLIGRAM(S): at 11:43

## 2023-07-19 RX ADMIN — Medication 5 MILLILITER(S): at 17:55

## 2023-07-19 RX ADMIN — ATORVASTATIN CALCIUM 80 MILLIGRAM(S): 80 TABLET, FILM COATED ORAL at 21:33

## 2023-07-19 RX ADMIN — Medication 5 MILLILITER(S): at 06:10

## 2023-07-19 RX ADMIN — DAPAGLIFLOZIN 10 MILLIGRAM(S): 10 TABLET, FILM COATED ORAL at 17:55

## 2023-07-19 RX ADMIN — APIXABAN 5 MILLIGRAM(S): 2.5 TABLET, FILM COATED ORAL at 06:10

## 2023-07-19 RX ADMIN — APIXABAN 5 MILLIGRAM(S): 2.5 TABLET, FILM COATED ORAL at 17:55

## 2023-07-19 RX ADMIN — Medication 1 DROP(S): at 17:55

## 2023-07-19 NOTE — PROGRESS NOTE ADULT - ASSESSMENT
Assessment/Plan:  WILFRIDO IRVIN is a 63y PMHx recent amyloidosis diagnosis presented to [facility] on 7/10 for dysarthria and L hemiparesis. Patient found to have a R MCA territory infarct without hemorrhagic conversion. Patient now admitted for a multidisciplinary rehab program. 07-14-23 @ 13:49    * Tolerating Eliquis - repeat CTH stable * Focus on balance and progressive walking in therapies * Continue to monitor rehab progress * Pt wishes for DC home as soon as possible *     R MCA CVA with left Hemiparesis   - ASA 81mg qd  - Lipitor 80mg qd  - Patient recommended to start full dose AC given hypercoagulable state 2/2 amyloidosis. Recommend CTH on 7/17 to access candidacy to start AC.  - Precautions: Aspiration, fall  - Continue comprehensive rehab program of PT/OT/SLP - 3 hours a day, 5 days a week. P&O as needed     Dysphagia  - Soft and bite sized diet with thins  - SLP consult  - Bedside swallow eval    Amylodosis--Recent Dx  --CTH 7/17, no interval change  --Neurologist Dr France, no objection to commencing AC as recs by cardiology  - Eliquis resumed on 7/18 - tolerating well  - CTH (7/19) post Eliquis resumption stable with no significant change     HLD  - cont statin    Pain  - Tylenol/oxycodone PRN    Sleep  - Melatonin PRN     GI / Bowel  - Senna qHS  - Miralax PRN Daily     / Bladder--voiding appropriately     Skin / Pressure injury--unremarkable    Diet/Dysphagia:  - Diet Consistency: Soft and bite sized  - Aspiration Precautions  - SLP consult for swallow function evaluation and treatment  - Nutrition consult    DVT prophylaxis:   - Lovenox  ---------------  Dr. Jain's Liason with family/providers:    7/17--Patient's daughter asked questions on patient's care, which were all answers including anticipated duration of treatment, expectations regarding functional recovery   We discussed findings from review, plan for neurology consult to evaluate need for anticoagulation. She was happy with the explanation  She also gave family background information and support network available to patient post discharge     7/18--Liaison with other providers  Dr Sebastian, Neurology--no objection to commencing anticoagulation as recs by cardiology  Hx of amyloidosis   ---------------  Outpatient Follow-up:    Sarah Damian  NP in Family Health  [address], [address]  [place] [address]  Phone: [phone]  Fax: [phone]  Follow Up Time: 2 weeks    Ayush Romero  Cardiology  [address] [facility] [address]  [place] [address]  Phone: [phone]  Fax: [phone]  Follow Up Time: 2 weeks  ---------------

## 2023-07-19 NOTE — DISCHARGE NOTE PROVIDER - NSFTFHOMEHTHYNRD_GEN_ALL_CORE
insulin pen needle (BD GABRIELLA U/F) 32G X 4 MM      Last Written Prescription Date: 01/11/2017  Last Fill Quantity: 120 each,  # refills: 1   Last Office Visit with NEELIMA, LYNN or [facility] prescribing provider: 06/27/2017                                         Next 5 appointments (look out 90 days)     Oct 23, 2017  3:00 PM CDT   Return Visit with Terence Del Cid MD   [facility] AT [facility] ([facility])    [address] [place] [id number] [phone]                         Yes

## 2023-07-19 NOTE — DISCHARGE NOTE PROVIDER - CARE PROVIDERS DIRECT ADDRESSES
,DirectAddress_Unknown,DirectAddress_Unknown,[email or web address]

## 2023-07-19 NOTE — DISCHARGE NOTE PROVIDER - HOSPITAL COURSE
HPI:  64yo R handed M PMHx recent amyloidosis diagnosis presented to [facility] on 7/10 for dysarthria and L hemiparesis. Patient found to have a R MCA territory infarct without hemorrhagic conversion. Suspected cardioembolic in origin in the setting of amyloidosis vs risk of a fib. Patient recommended to start full dose AC given hypercoagulable state 2/2 amyloidosis. Recommend CTH on 7/17 to access candidacy to start AC. Patient evaluated by PT/OT and recommended for acute inpatient rehab. Patient is medically stable for discharge to [facility] on 7/14. (14 Jul 2023 13:46)      Patient was evaluated by PM&R and therapy for gait/ADL impairments and recommended acute rehabilitation. Patient was medically optimized for discharge to [facility] on 7/14/23. Admitted with gait instability, ADL, and functional impairments.     Rehab course significant for:  7/17: Work on balance and progressive ambulation in therapies. SLP to 90 and PT to 30. Repeat CTH negative.  7/18: Farxiga per hospitalist   7/19: Therapies- 30/60/90. CTH- unchanged.     All other medical co-morbidities were stable. Patient tolerated course of inpatient PT/OT/SLP rehab with significant improvements and met rehab goals prior to discharge. Patient was medically cleared on 7/22/23 for discharge to home with home care. HPI:  62yo R handed M PMHx recent amyloidosis diagnosis presented to [facility] on 7/10 for dysarthria and L hemiparesis. Patient found to have a R MCA territory infarct without hemorrhagic conversion. Suspected cardioembolic in origin in the setting of amyloidosis vs risk of a fib. Patient recommended to start full dose AC given hypercoagulable state 2/2 amyloidosis. Recommend CTH on 7/17 to access candidacy to start AC. Patient evaluated by PT/OT and recommended for acute inpatient rehab. Patient is medically stable for discharge to [facility] on 7/14. (14 Jul 2023 13:46)      Patient was evaluated by PM&R and therapy for gait/ADL impairments and recommended acute rehabilitation. Patient was medically optimized for discharge to [facility] on 7/14/23. Admitted with gait instability, ADL, and functional impairments.     Rehab course significant for:  7/17: Work on balance and progressive ambulation in therapies. SLP to 90 and PT to 30. Repeat CTH negative.  7/18: Farxiga per hospitalist   7/19: Therapies- 30/60/90. CTH- unchanged.     All other medical co-morbidities were stable. Patient tolerated course of inpatient PT/OT/SLP rehab with significant improvements and met rehab goals prior to discharge.   Patient was medically cleared on 7/21/23 for discharge to home with home care.

## 2023-07-19 NOTE — DISCHARGE NOTE PROVIDER - NSDCFUSCHEDAPPT_GEN_ALL_CORE_FT
Davidson Morgan  [facility] Physician [facility]  CARDIOLOGY [address]  Scheduled Appointment: 09/06/2023

## 2023-07-19 NOTE — DISCHARGE NOTE PROVIDER - CARE PROVIDER_API CALL
Lance Jain  Physical/Rehab Medicine  [address] [place] [address]  Phone: [phone]  Fax: [phone]  Follow Up Time: 1 month    Sarah Damian  NP in Family Health  [address], [address]  [place] [address]  Phone: [phone]  Fax: [phone]  Follow Up Time: 1 week    Ayush Romero  Cardiology  [address]  [place] [address]  Phone: [phone]  Fax: [phone]  Follow Up Time: 1 week

## 2023-07-19 NOTE — DISCHARGE NOTE PROVIDER - PROVIDER TOKENS
PROVIDER:[TOKEN:[[id number]],FOLLOWUP:[1 month]],PROVIDER:[TOKEN:[[id number]],FOLLOWUP:[1 week]],PROVIDER:[TOKEN:[[id number]],FOLLOWUP:[1 week]]

## 2023-07-19 NOTE — PROGRESS NOTE ADULT - SUBJECTIVE AND OBJECTIVE BOX
Subjective  Patient seen and examined at bedside with Dr. Jain  Admits to sleeping well.  Last BM on 7/17, per patient.  Await CTH results.   Tolerating Eliquis.   Pt wishes for DC home as soon as possible.   No other complaints at this time.    Denies headache, dizziness, visual changes, chest pain, SOB/TOWNSEND, abdominal pain, nausea, vomiting, diarrhea, dysuria, numbness or tingling.     Therapy--  Motor function sustained improvement  Working on co ordination, endurance and progressive ambulation      Vital Signs Last 24 Hrs  T(C): 36.9 (19 Jul 2023 08:23), Max: 36.9 (19 Jul 2023 08:23)  T(F): 98.4 (19 Jul 2023 08:23), Max: 98.4 (19 Jul 2023 08:23)  HR: 74 (19 Jul 2023 08:23) (55 - 74)  BP: 107/63 (19 Jul 2023 08:23) (103/67 - 107/63)  BP(mean): --  RR: 16 (19 Jul 2023 08:23) (16 - 16)  SpO2: 97% (19 Jul 2023 08:23) (94% - 97%)      EXAM  Gen--Comfortable  HEENT - supple  Neck - Supple, No limited ROM  Pulm - clear   Cardiovascular - RRR, S1S2  Chest - normal heart sounds  Abdomen - Soft, non tender , +BS  Extremities - No Cyanosis, no clubbing, no edema, no calf tenderness      Neuro   Fully oriented      Communication - + dysarthria, hypophonia     Judgement: Good evidence of judgement     Memory: Recall 3 objects immediate and 3 min later     Cranial Nerves -  left visual field deficit , mild L facial palsy      Motor -                     LEFT    UE - ShAB 4/5                    RIGHT UE - ShAB 5/5, EF 5/5, EE 5/5,   5/5                    LEFT    LE - 4/5                    RIGHT LE - HF 5/5, KE 5/5, DF 5/5, PF 5/5        Sensory - Intact  to LT on right/left side or bilaterally     Reflexes - DTR Intact, No primitive reflexive     Coordination - FTN intact but slower to left hand     Tone - normal  MSK: Decreased ROM to right shoulder 2/2 osteoarthritis, mild left pronator drift.  Psychiatric - Mood stable, Affect WNL  Skin:  all skin intac      RECENT LABS/IMAGING                        15.1   7.54  )-----------( 213      ( 17 Jul 2023 05:45 )             45.5     07-17    136  |  102  |  20  ----------------------------<  84  3.8   |  25  |  0.85    Ca    9.0      17 Jul 2023 05:45    TPro  7.2  /  Alb  2.9<L>  /  TBili  1.6<H>  /  DBili  x   /  AST  46<H>  /  ALT  61<H>  /  AlkPhos  126<H>  07-17    Urinalysis Basic - ( 17 Jul 2023 05:45 )    Color: x / Appearance: x / SG: x / pH: x  Gluc: 84 mg/dL / Ketone: x  / Bili: x / Urobili: x   Blood: x / Protein: x / Nitrite: x   Leuk Esterase: x / RBC: x / WBC x   Sq Epi: x / Non Sq Epi: x / Bacteria: x      CTH (7/17)   IMPRESSION:  Redemonstration of tube right MCA territory infarct. Redemonstration of petechial hemorrhagic transformation involving the right parietotemporal region.  No hydrocephalus, midline shift, or effacement of the basal cisterns.    CT Head No Cont - 07.19.23  IMPRESSION:  No significant interval change from 7/17/2023.        MEDICATIONS  (STANDING):  apixaban 5 milliGRAM(s) Oral two times a day  artificial tears (preservative free) Ophthalmic Solution 1 Drop(s) Both EYES every 6 hours  aspirin  chewable 81 milliGRAM(s) Oral daily  atorvastatin 80 milliGRAM(s) Oral at bedtime  dapagliflozin 10 milliGRAM(s) Oral every 24 hours  FIRST- Mouthwash  BLM 15 milliLiter(s) Swish and Spit two times a day  nystatin    Suspension 5 milliLiter(s) Oral two times a day  polyethylene glycol 3350 17 Gram(s) Oral daily  senna 2 Tablet(s) Oral at bedtime    MEDICATIONS  (PRN):  acetaminophen     Tablet .. 650 milliGRAM(s) Oral every 6 hours PRN Mild Pain (1 - 3)  melatonin 3 milliGRAM(s) Oral at bedtime PRN Insomnia  oxyCODONE    IR 10 milliGRAM(s) Oral every 4 hours PRN Severe Pain (7 - 10)  oxyCODONE    IR 5 milliGRAM(s) Oral every 4 hours PRN Moderate Pain (4 - 6)   Subjective  Patient seen and examined at bedside with Dr. Jain  Admits to sleeping well.  Last BM on 7/17, per patient.  Await CTH results.   Tolerating Eliquis.   Pt wishes for DC home as soon as possible.   No other complaints at this time.    ROS  Denies headache, dizziness, visual changes, chest pain, SOB/TOWNSEND, abdominal pain, nausea, vomiting, diarrhea, dysuria, numbness or tingling.     Therapy--Observed during OT today  Motor function sustained improvement  Working on co ordination, endurance and progressive ambulation      Vital Signs Last 24 Hrs  T(C): 36.9 (19 Jul 2023 08:23), Max: 36.9 (19 Jul 2023 08:23)  T(F): 98.4 (19 Jul 2023 08:23), Max: 98.4 (19 Jul 2023 08:23)  HR: 74 (19 Jul 2023 08:23) (55 - 74)  BP: 107/63 (19 Jul 2023 08:23) (103/67 - 107/63)  RR: 16 (19 Jul 2023 08:23) (16 - 16)  SpO2: 97% (19 Jul 2023 08:23) (94% - 97%)      EXAM  Gen--Comfortable  HEENT - supple  Neck - Supple, No limited ROM  Pulm - clear   Cardiovascular - RRR, S1S2  Chest - normal heart sounds  Abdomen - Soft, non tender , +BS  Extremities - No Cyanosis, no clubbing, no edema, no calf tenderness      Neuro   Fully oriented    + dysarthria, hypophonia     Cranial Nerves -  left visual field deficit , mild L facial palsy      Motor -                     LEFT    UE - ShAB 4/5                    RIGHT UE - ShAB 5/5, EF 5/5, EE 5/5,   5/5                    LEFT    LE - 4/5                    RIGHT LE - HF 5/5, KE 5/5, DF 5/5, PF 5/5        Sensory - Intact  to LT on right/left side or bilaterally     Reflexes - DTR Intact, No primitive reflexive     Coordination - FTN intact but slower to left hand     Tone - normal  MSK: Decreased ROM to right shoulder 2/2 osteoarthritis, mild left pronator drift.  Psychiatric - Mood stable, Affect WNL  Skin:  all skin intact      RECENT LABS/IMAGING                        15.1   7.54  )-----------( 213      ( 17 Jul 2023 05:45 )             45.5     07-17    136  |  102  |  20  ----------------------------<  84  3.8   |  25  |  0.85    Ca    9.0      17 Jul 2023 05:45    TPro  7.2  /  Alb  2.9<L>  /  TBili  1.6<H>  /  DBili  x   /  AST  46<H>  /  ALT  61<H>  /  AlkPhos  126<H>  07-17    Urinalysis Basic - ( 17 Jul 2023 05:45 )    Color: x / Appearance: x / SG: x / pH: x  Gluc: 84 mg/dL / Ketone: x  / Bili: x / Urobili: x   Blood: x / Protein: x / Nitrite: x   Leuk Esterase: x / RBC: x / WBC x   Sq Epi: x / Non Sq Epi: x / Bacteria: x      CTH (7/17)   IMPRESSION:  Redemonstration of tube right MCA territory infarct. Redemonstration of petechial hemorrhagic transformation involving the right parietotemporal region.  No hydrocephalus, midline shift, or effacement of the basal cisterns.    CT Head No Cont - 07.19.23  IMPRESSION:  No significant interval change from 7/17/2023.    MEDICATIONS  (STANDING):  apixaban 5 milliGRAM(s) Oral two times a day  artificial tears (preservative free) Ophthalmic Solution 1 Drop(s) Both EYES every 6 hours  aspirin  chewable 81 milliGRAM(s) Oral daily  atorvastatin 80 milliGRAM(s) Oral at bedtime  dapagliflozin 10 milliGRAM(s) Oral every 24 hours  FIRST- Mouthwash  BLM 15 milliLiter(s) Swish and Spit two times a day  nystatin    Suspension 5 milliLiter(s) Oral two times a day  polyethylene glycol 3350 17 Gram(s) Oral daily  senna 2 Tablet(s) Oral at bedtime    MEDICATIONS  (PRN):  acetaminophen     Tablet .. 650 milliGRAM(s) Oral every 6 hours PRN Mild Pain (1 - 3)  melatonin 3 milliGRAM(s) Oral at bedtime PRN Insomnia  oxyCODONE    IR 10 milliGRAM(s) Oral every 4 hours PRN Severe Pain (7 - 10)  oxyCODONE    IR 5 milliGRAM(s) Oral every 4 hours PRN Moderate Pain (4 - 6)

## 2023-07-19 NOTE — DISCHARGE NOTE PROVIDER - NSDCCPCAREPLAN_GEN_ALL_CORE_FT
PRINCIPAL DISCHARGE DIAGNOSIS  Diagnosis: CVA (cerebrovascular accident)  Assessment and Plan of Treatment: You presented to the hospital for stroke like symptoms and your workup was significant for a stroke. You were sent to  acute rehab to help improve your strength and overall function. Please follow up with the following providers listed in this packet for further management.      SECONDARY DISCHARGE DIAGNOSES  Diagnosis: Amyloidosis  Assessment and Plan of Treatment: Please continue to take your anticoagulation medication as prescribed. Please follow up with your Cardiologist/PCP for further management.     PRINCIPAL DISCHARGE DIAGNOSIS  Diagnosis: CVA (cerebrovascular accident)  Assessment and Plan of Treatment: You presented to the hospital for stroke like symptoms and your workup was significant for a stroke. You were sent to  acute rehab to help improve your strength and overall function. Please follow up with the following providers listed in this packet for further management.      SECONDARY DISCHARGE DIAGNOSES  Diagnosis: Amyloidosis  Assessment and Plan of Treatment: Please continue to take your anticoagulation medication as prescribed. Please follow up with your Cardiologist/PCP for further management.    Diagnosis: Urinary hesitancy  Assessment and Plan of Treatment: Your urinary hesitancy made be secondary to urinary retention or an enlarged prostate. You deferred inpatient workup. Please follow up with your PCP for further managment.

## 2023-07-19 NOTE — DISCHARGE NOTE PROVIDER - NSDCMRMEDTOKEN_GEN_ALL_CORE_FT
acetaminophen 325 mg oral tablet: 2 tab(s) orally every 6 hours As needed Mild Pain (1 - 3)  aspirin 81 mg oral tablet, chewable: 1 tab(s) orally once a day  melatonin 3 mg oral tablet: 1 tab(s) orally once a day (at bedtime) As needed Insomnia  ocular lubricant ophthalmic solution: 1 drop(s) to each affected eye every 6 hours  polyethylene glycol 3350 oral powder for reconstitution: 17 gram(s) orally once a day  senna leaf extract oral tablet: 2 tab(s) orally once a day (at bedtime)   acetaminophen 325 mg oral tablet: 2 tab(s) orally every 6 hours As needed Mild Pain (1 - 3)  apixaban 5 mg oral tablet: 1 tab(s) orally 2 times a day  aspirin 81 mg oral tablet, chewable: 1 tab(s) orally once a day  atorvastatin 80 mg oral tablet: 1 tab(s) orally once a day (at bedtime)  dapagliflozin 10 mg oral tablet: 1 tab(s) orally every 24 hours  diphenhydramine/lidocaine/aluminum hydroxide/magnesium hydroxide/simethicone mucous membrane suspension: 5 milliliter(s) mucous membrane 2 times a day  melatonin 3 mg oral tablet: 1 tab(s) orally once a day (at bedtime) As needed Insomnia  nystatin 100,000 units/mL oral suspension: 5 milliliter(s) orally 2 times a day Swish and spit  ocular lubricant ophthalmic solution: 1 drop(s) to each affected eye every 6 hours  pantoprazole 40 mg oral delayed release tablet: 1 tab(s) orally once a day (before a meal)  polyethylene glycol 3350 oral powder for reconstitution: 17 gram(s) orally once a day  senna leaf extract oral tablet: 2 tab(s) orally once a day (at bedtime)

## 2023-07-19 NOTE — DISCHARGE NOTE PROVIDER - NSDCFUADDAPPT_GEN_ALL_CORE_FT
Please follow up with your PCP as soon as possible.     If you are in need of a PCP or a specialist in your area: contact the [facility] Physician Referral Service at [phone].

## 2023-07-20 LAB
ALBUMIN SERPL ELPH-MCNC: 3.7 G/DL — SIGNIFICANT CHANGE UP (ref 3.3–5)
ALP SERPL-CCNC: 165 U/L — HIGH (ref 40–120)
ALT FLD-CCNC: 55 U/L — HIGH (ref 10–45)
ANION GAP SERPL CALC-SCNC: 11 MMOL/L — SIGNIFICANT CHANGE UP (ref 5–17)
AST SERPL-CCNC: 49 U/L — HIGH (ref 10–40)
BILIRUB SERPL-MCNC: 1.5 MG/DL — HIGH (ref 0.2–1.2)
BUN SERPL-MCNC: 23 MG/DL — SIGNIFICANT CHANGE UP (ref 7–23)
CALCIUM SERPL-MCNC: 9.5 MG/DL — SIGNIFICANT CHANGE UP (ref 8.4–10.5)
CHLORIDE SERPL-SCNC: 98 MMOL/L — SIGNIFICANT CHANGE UP (ref 96–108)
CO2 SERPL-SCNC: 26 MMOL/L — SIGNIFICANT CHANGE UP (ref 22–31)
CREAT SERPL-MCNC: 1.17 MG/DL — SIGNIFICANT CHANGE UP (ref 0.5–1.3)
EGFR: 71 ML/MIN/1.73M2 — SIGNIFICANT CHANGE UP
GLUCOSE SERPL-MCNC: 80 MG/DL — SIGNIFICANT CHANGE UP (ref 70–99)
HCT VFR BLD CALC: 51.2 % — HIGH (ref 39–50)
HGB BLD-MCNC: 16.7 G/DL — SIGNIFICANT CHANGE UP (ref 13–17)
MCHC RBC-ENTMCNC: 29.5 PG — SIGNIFICANT CHANGE UP (ref 27–34)
MCHC RBC-ENTMCNC: 32.6 GM/DL — SIGNIFICANT CHANGE UP (ref 32–36)
MCV RBC AUTO: 90.5 FL — SIGNIFICANT CHANGE UP (ref 80–100)
NRBC # BLD: 0 /100 WBCS — SIGNIFICANT CHANGE UP (ref 0–0)
PLATELET # BLD AUTO: 261 K/UL — SIGNIFICANT CHANGE UP (ref 150–400)
POTASSIUM SERPL-MCNC: 3.4 MMOL/L — LOW (ref 3.5–5.3)
POTASSIUM SERPL-SCNC: 3.4 MMOL/L — LOW (ref 3.5–5.3)
PROT SERPL-MCNC: 8.7 G/DL — HIGH (ref 6–8.3)
RBC # BLD: 5.66 M/UL — SIGNIFICANT CHANGE UP (ref 4.2–5.8)
RBC # FLD: 12.5 % — SIGNIFICANT CHANGE UP (ref 10.3–14.5)
SODIUM SERPL-SCNC: 135 MMOL/L — SIGNIFICANT CHANGE UP (ref 135–145)
WBC # BLD: 9.08 K/UL — SIGNIFICANT CHANGE UP (ref 3.8–10.5)
WBC # FLD AUTO: 9.08 K/UL — SIGNIFICANT CHANGE UP (ref 3.8–10.5)

## 2023-07-20 PROCEDURE — 99232 SBSQ HOSP IP/OBS MODERATE 35: CPT

## 2023-07-20 RX ADMIN — APIXABAN 5 MILLIGRAM(S): 2.5 TABLET, FILM COATED ORAL at 06:51

## 2023-07-20 RX ADMIN — Medication 5 MILLILITER(S): at 06:52

## 2023-07-20 RX ADMIN — Medication 1 DROP(S): at 06:51

## 2023-07-20 RX ADMIN — Medication 1 DROP(S): at 18:03

## 2023-07-20 RX ADMIN — Medication 650 MILLIGRAM(S): at 16:45

## 2023-07-20 RX ADMIN — ATORVASTATIN CALCIUM 80 MILLIGRAM(S): 80 TABLET, FILM COATED ORAL at 21:12

## 2023-07-20 RX ADMIN — Medication 5 MILLILITER(S): at 17:57

## 2023-07-20 RX ADMIN — DAPAGLIFLOZIN 10 MILLIGRAM(S): 10 TABLET, FILM COATED ORAL at 17:57

## 2023-07-20 RX ADMIN — POLYETHYLENE GLYCOL 3350 17 GRAM(S): 17 POWDER, FOR SOLUTION ORAL at 13:16

## 2023-07-20 RX ADMIN — APIXABAN 5 MILLIGRAM(S): 2.5 TABLET, FILM COATED ORAL at 17:58

## 2023-07-20 RX ADMIN — Medication 1 DROP(S): at 12:03

## 2023-07-20 RX ADMIN — Medication 81 MILLIGRAM(S): at 13:16

## 2023-07-20 RX ADMIN — DIPHENHYDRAMINE HYDROCHLORIDE AND LIDOCAINE HYDROCHLORIDE AND ALUMINUM HYDROXIDE AND MAGNESIUM HYDRO 15 MILLILITER(S): KIT at 17:57

## 2023-07-20 RX ADMIN — DIPHENHYDRAMINE HYDROCHLORIDE AND LIDOCAINE HYDROCHLORIDE AND ALUMINUM HYDROXIDE AND MAGNESIUM HYDRO 15 MILLILITER(S): KIT at 06:52

## 2023-07-20 RX ADMIN — Medication 650 MILLIGRAM(S): at 15:58

## 2023-07-20 NOTE — PROGRESS NOTE ADULT - NS ATTEND AMEND GEN_ALL_CORE FT
Seen and examined with NP, findings as noted, note revised    No acute med complaint   Sustained improvement of motor function   Deficits with hand co ordination, progressive ambulation and higher executive function    Labs unremarkable  CTH unremarkable    Continue therapy  Await Neurology review
Seen and examined with NP  Findings as noted    No interval med complaint   IDT today function as stated  Still has mild left neglect.    Motor strength normal  Working on ADLs fine motor activity and co ordination    Discussed with son present at bed side  Labs unremarkable  CTH post AC commencement unremarkable    Continue therapy  F/U with cardiology and neurology post d/c 7/22
Seen and examined, findings as noted    Seen with daughter, no interval med complaint   Observed in therapy, sustained improvement noted    Clinically stable    Liaison with neurology as stated    Continue therapy  Commence apixiban  Repeat CTH tomorrow as recommended  IDT details as noted
Seen and examined, with NP    No medical complaint  Nursing staff noted patient doing personal exercises in his rrom  Therapy will evaluate patient for capacity for unsupervised ambulation in his room    Labs unremarkable  Tolerating anticoagulation  CTH post AC commencement unremarkable     MMT 5/5    Patient requesting early DC     Continue therapy--progressive ambulation  Therapy to evaluate and make recs regarding safety for unsupervised ambulation in his room  IDT tomorrow, anticipate dc date 7/22

## 2023-07-20 NOTE — PROGRESS NOTE ADULT - ASSESSMENT
62 y/o M with no PMH who presented to [facility] 7/10 with dysarthria and L hemiparesis; found to have R MCA infarct. Suspected to be cardioembolic in the setting of amyloidosis vs risk of A-fib. Now admitted to [facility] for rehab.     R MCA CVA  -Continue with ASA, statin  -CTH 7/17 showed petechial hge.  -Neuro consulted. suggested  to resume Eliquis, repeat CTH on 12 hrs to reassess hge  -Continue comprehensive rehab program - PT/OT/SLP per rehab team  -Pain management, bowel regimen per rehab     Hypokalemia  -Replete and monitor    Amyloidosis   CM  -7/11 TTE: EF 36%. Mild pulmonary Hypertension   -Son reported to rehab team that patient was lasix and Farxiga at home. [facility] chart reviewed. both meds held there. Cardio cx at [facility] noted  -Resumed Farxiga 7/19. Lasix held as patient remains euvolemic   -Unclear why patient is not on other GDMT. [Metoprolol ER or Coreg, MRA, ACEI/ARB/ARNI. can start OP]  -If hypertension noted, can start coreg  -Outpatient cardiac follow up in 1-2 week post-DC  -Eliquis resumed 7/18, tolerated well, CTH 7/19 stable     Hyperlipidemia   -Continue statin, monitor LFTs    DVT ppx - eliquis

## 2023-07-20 NOTE — PROGRESS NOTE ADULT - ASSESSMENT
Assessment/Plan:  WILFRIDO IRVIN is a 63y PMHx recent amyloidosis diagnosis presented to [facility] on 7/10 for dysarthria and L hemiparesis. Patient found to have a R MCA territory infarct without hemorrhagic conversion. Patient now admitted for a multidisciplinary rehab program. 07-14-23 @ 13:49    * Tolerating Eliquis - repeat CTH stable * Focus on balance and progressive walking in therapies * Continue to monitor rehab progress * Pt wishes for DC home as soon as possible *     R MCA CVA with left Hemiparesis   - ASA 81mg qd  - Lipitor 80mg qd  - Patient recommended to start full dose AC given hypercoagulable state 2/2 amyloidosis. Recommend CTH on 7/17 to access candidacy to start AC.  - Precautions: Aspiration, fall  - Continue comprehensive rehab program of PT/OT/SLP - 3 hours a day, 5 days a week. P&O as needed     Dysphagia  - Soft and bite sized diet with thins  - SLP consult  - Bedside swallow eval    Amylodosis--Recent Dx  --CTH 7/17, no interval change  --Neurologist Dr France, no objection to commencing AC as recs by cardiology  - Eliquis resumed on 7/18 - tolerating well  - CTH (7/19) post Eliquis resumption stable with no significant change     HLD  - cont statin    Pain  - Tylenol/oxycodone PRN    Sleep  - Melatonin PRN     GI / Bowel  - Senna qHS  - Miralax PRN Daily     / Bladder--voiding appropriately     Skin / Pressure injury--unremarkable    Diet/Dysphagia:  - Diet Consistency: Soft and bite sized  - Aspiration Precautions  - SLP consult for swallow function evaluation and treatment  - Nutrition consult    DVT prophylaxis:   - Lovenox  ---------------  Dr. Jain's Liason with family/providers:    7/17--Patient's daughter asked questions on patient's care, which were all answers including anticipated duration of treatment, expectations regarding functional recovery   We discussed findings from review, plan for neurology consult to evaluate need for anticoagulation. She was happy with the explanation  She also gave family background information and support network available to patient post discharge     7/18--Liaison with other providers  Dr Sebastian, Neurology--no objection to commencing anticoagulation as recs by cardiology  Hx of amyloidosis   ---------------  Outpatient Follow-up:    Sarah Damian  NP in Family Health  [address], [address]  [place] [address]  Phone: [phone]  Fax: [phone]  Follow Up Time: 2 weeks    Ayush Romero  Cardiology  [address], [facility] [address]  [place] [address]  Phone: [phone]  Fax: [phone]  Follow Up Time: 2 weeks  --------------- Assessment/Plan:  WILFRIDO IRVIN is a 63y PMHx recent amyloidosis diagnosis presented to [facility] on 7/10 for dysarthria and L hemiparesis. Patient found to have a R MCA territory infarct without hemorrhagic conversion. Patient now admitted for a multidisciplinary rehab program. 07-14-23 @ 13:49    * Tolerating Eliquis - repeat CTH stable * Focus on balance and progressive walking in therapies * Continue to monitor rehab progress * Pt wishes for DC home as soon as possible *     R MCA CVA with left Hemiparesis   - ASA 81mg qd  - Lipitor 80mg qd  - Patient recommended to start full dose AC given hypercoagulable state 2/2 amyloidosis. Recommend CTH on 7/17 to access candidacy to start AC.  - Precautions: Aspiration, fall  - Continue comprehensive rehab program of PT/OT/SLP - 3 hours a day, 5 days a week. P&O as needed     Dysphagia  - Soft and bite sized diet with thins  - SLP consult  - Bedside swallow eval    Amylodosis--Recent Dx  --CTH 7/17, no interval change  --Neurologist Dr France, no objection to commencing AC as recs by cardiology  - Eliquis resumed on 7/18 - tolerating well  - CTH (7/19) post Eliquis resumption stable with no significant change     HLD  - cont statin    Pain  - Tylenol/oxycodone PRN    Sleep  - Melatonin PRN     GI / Bowel  - Senna qHS  - Miralax PRN Daily     / Bladder--voiding appropriately     Skin / Pressure injury--unremarkable    Diet/Dysphagia:  - Diet Consistency: Soft and bite sized  - Aspiration Precautions  - SLP consult for swallow function evaluation and treatment  - Nutrition consult    DVT prophylaxis:   - Lovenox  ---------------  Dr. Jain's Liason with family/providers:    7/17--Patient's daughter asked questions on patient's care, which were all answers including anticipated duration of treatment, expectations regarding functional recovery   We discussed findings from review, plan for neurology consult to evaluate need for anticoagulation. She was happy with the explanation  She also gave family background information and support network available to patient post discharge     7/18--Liaison with other providers  Dr Sebastian, Neurology--no objection to commencing anticoagulation as recs by cardiology  Hx of amyloidosis   ---------------  IDT conference on 7/20  TDD: 7/22 to home   Barriers: Flat affect, visual deficits, emotionally labile.  Social Work: Lives in [facility] with 0STE, 16 STI but elevator access. Daughter available for support.   DME: Needs shower chair.   OT: Setup-supervision for ADLs/transfers. CGA for shower transfers.   PT: Sup for transfers; ambulated 120+ feet with no device supervision level; negotiated 8 stairs with 2 HR with supervision.   SLP: Reg w/ TLs. Moderate cog deficits.  ---------------  Outpatient Follow-up:    Sarah Damian  NP in Family Health  [address], [facility] [address]  [place] [address]  Phone: [phone]  Fax: [phone]  Follow Up Time: 2 weeks    Ayush Romero  Cardiology  [address], [facility] [address]  [place] [address]  Phone: [phone]  Fax: [phone]  Follow Up Time: 2 weeks  --------------- Assessment/Plan:  WILFRIDO IRVIN is a 63y PMHx recent amyloidosis diagnosis presented to [facility] on 7/10 for dysarthria and L hemiparesis. Patient found to have a R MCA territory infarct without hemorrhagic conversion. Patient now admitted for a multidisciplinary rehab program. 07-14-23 @ 13:49    * Tolerating Eliquis - repeat CTH stable   * Focus on balance and progressive walking in therapies   * Continue to monitor rehab progress   * Pt wishes for DC home as soon as possible  IDT today--home DC 7/22  * F/U with his cardiologist Dr Ayush Horta and support workers (Ms Hernandez ph  and Ms Myers --for details regarding cardiology treatments for his amylodosis, post acute rehab treatment)  (no response phone call to both numbers)     R MCA CVA with left Hemiparesis   - ASA 81mg qd  - Lipitor 80mg qd  - Precautions: Aspiration, fall  - Continue comprehensive rehab program of PT/OT/SLP - 3 hours a day, 5 days a week. P&O as needed   --* F/U with his cardiologist Dr Ayush Horta and support workers (Ms Hernandez ph  and Ms Myers --for details regarding cardiology treatments for his amylodosis, post acute rehab treatment)  (no response phone call to both numbers)     Dysphagia  - Soft and bite sized diet with thins  - SLP consult  - Bedside swallow eval    Amylodosis--Recent Dx  --CTH 7/17, no interval change  --Neurologist Dr France, no objection to commencing AC as recs by cardiology  - Eliquis resumed on 7/18 - tolerating well  - CTH (7/19) post Eliquis resumption stable with no significant change     HLD  - cont statin    Pain  - Tylenol/oxycodone PRN    Sleep  - Melatonin PRN     GI / Bowel  - Senna qHS  - Miralax PRN Daily     / Bladder--voiding appropriately     Skin / Pressure injury--unremarkable    Diet/Dysphagia:  - Diet Consistency: Soft and bite sized  - Aspiration Precautions  - SLP consult for swallow function evaluation and treatment  - Nutrition consult    DVT prophylaxis/Amyloisosis:   --Apixiban\  7/20/23--Labs reviewed, normal cbc renal and liver function  CT head post AC commencement, unremarkable    ---------------  Dr. Jain's Liason with family/providers:    7/17--Patient's daughter asked questions on patient's care, which were all answers including anticipated duration of treatment, expectations regarding functional recovery   We discussed findings from review, plan for neurology consult to evaluate need for anticoagulation. She was happy with the explanation  She also gave family background information and support network available to patient post discharge     7/20--Son at bedside, I gave him functional update  He gave some details of patient's cardiologist and support staff. I was unable to contact them at this time  However, patient will f/u with them post d/c from acute rehab    7/18--Liaison with other providers  Dr Sebastian, Neurology--no objection to commencing anticoagulation as recs by cardiology  Hx of amyloidosis   ---------------  IDT conference on 7/20  TDD: 7/22 to home   Barriers: Flat affect, visual deficits, emotionally labile.  Social Work: Lives in [facility] with 0STE, 16 STI but elevator access. Daughter available for support.   DME: Needs shower chair.   OT: Setup-supervision for ADLs/transfers. CGA for shower transfers.   PT: Sup for transfers; ambulated 120+ feet with no device supervision level; negotiated 8 stairs with 2 HR with supervision.   SLP: Reg w/ TLs. Moderate cog deficits.  ---------------  Outpatient Follow-up:    Sarah Damian  NP in Family Health  [address], [address]  [place] [address]  Phone: [phone]  Fax: [phone]  Follow Up Time: 2 weeks    Ayush Romero  Cardiology  [address], [address]  [place] [address]  Phone: [phone]  Fax: [phone]  Follow Up Time: 2 weeks  ---------------

## 2023-07-20 NOTE — PROGRESS NOTE ADULT - SUBJECTIVE AND OBJECTIVE BOX
Patient is a 63y old  Male who presents with a chief complaint of CVA (20 Jul 2023 10:31)      Patient seen and examined at bedside. denies acute medical complaints.     ALLERGIES:  No Known Allergies    MEDICATIONS  (STANDING):  apixaban 5 milliGRAM(s) Oral two times a day  artificial tears (preservative free) Ophthalmic Solution 1 Drop(s) Both EYES every 6 hours  aspirin  chewable 81 milliGRAM(s) Oral daily  atorvastatin 80 milliGRAM(s) Oral at bedtime  dapagliflozin 10 milliGRAM(s) Oral every 24 hours  FIRST- Mouthwash  BLM 15 milliLiter(s) Swish and Spit two times a day  nystatin    Suspension 5 milliLiter(s) Oral two times a day  polyethylene glycol 3350 17 Gram(s) Oral daily  senna 2 Tablet(s) Oral at bedtime    MEDICATIONS  (PRN):  acetaminophen     Tablet .. 650 milliGRAM(s) Oral every 6 hours PRN Mild Pain (1 - 3)  melatonin 3 milliGRAM(s) Oral at bedtime PRN Insomnia  oxyCODONE    IR 5 milliGRAM(s) Oral every 4 hours PRN Moderate Pain (4 - 6)  oxyCODONE    IR 10 milliGRAM(s) Oral every 4 hours PRN Severe Pain (7 - 10)    Vital Signs Last 24 Hrs  T(F): 97.8 (20 Jul 2023 09:53), Max: 98.2 (19 Jul 2023 21:15)  HR: 58 (20 Jul 2023 09:53) (58 - 66)  BP: 104/69 (20 Jul 2023 09:53) (104/69 - 111/68)  RR: 16 (20 Jul 2023 09:53) (16 - 16)  SpO2: 96% (20 Jul 2023 09:53) (96% - 96%)  I&O's Summary        PHYSICAL EXAM:  General: NAD, +dysarthria, +hypophonia  ENT: MMM, no scleral icterus  Neck: Supple, No JVD  Lungs: Clear to auscultation bilaterally, no wheezes, rales, rhonchi  Cardio: RRR, S1/S2  Abdomen: Soft, Nontender, Nondistended; Bowel sounds present  Extremities: No calf tenderness, No pitting edema    LABS:                        16.7   9.08  )-----------( 261      ( 20 Jul 2023 06:25 )             51.2       07-20    135  |  98  |  23  ----------------------------<  80  3.4   |  26  |  1.17    Ca    9.5      20 Jul 2023 06:25    TPro  8.7  /  Alb  3.7  /  TBili  1.5  /  DBili  x   /  AST  49  /  ALT  55  /  AlkPhos  165  07-20                07-10 Chol 377 mg/dL LDL -- HDL 75 mg/dL Trig 357 mg/dL                  Urinalysis Basic - ( 20 Jul 2023 06:25 )    Color: x / Appearance: x / SG: x / pH: x  Gluc: 80 mg/dL / Ketone: x  / Bili: x / Urobili: x   Blood: x / Protein: x / Nitrite: x   Leuk Esterase: x / RBC: x / WBC x   Sq Epi: x / Non Sq Epi: x / Bacteria: x            RADIOLOGY & ADDITIONAL TESTS:    Care Discussed with Consultants/Other Providers: yes, rehab

## 2023-07-20 NOTE — PROGRESS NOTE ADULT - SUBJECTIVE AND OBJECTIVE BOX
Subjective  Patient seen and examined at bedside with Dr. Jain  Admits to sleeping well.  Last BM on 7/20, per patient.  Tolerating Eliquis.   Discussed CTH results.  Discussed plan for DC home 7/22.    No other complaints at this time.    ROS  Denies headache, dizziness, visual changes, chest pain, SOB/TOWNSEND, abdominal pain, nausea, vomiting, diarrhea, dysuria, numbness or tingling.     Therapy--Observed during OT today  Motor function sustained improvement  Working on co ordination, endurance and progressive ambulation      Vital Signs Last 24 Hrs  T(C): 36.6 (20 Jul 2023 09:53), Max: 36.8 (19 Jul 2023 21:15)  T(F): 97.8 (20 Jul 2023 09:53), Max: 98.2 (19 Jul 2023 21:15)  HR: 58 (20 Jul 2023 09:53) (58 - 66)  BP: 104/69 (20 Jul 2023 09:53) (104/69 - 111/68)  BP(mean): --  RR: 16 (20 Jul 2023 09:53) (16 - 16)  SpO2: 96% (20 Jul 2023 09:53) (96% - 96%)      EXAM  Gen--Comfortable  HEENT - supple  Neck - Supple, No limited ROM  Pulm - clear   Cardiovascular - RRR, S1S2  Chest - normal heart sounds  Abdomen - Soft, non tender , +BS  Extremities - No Cyanosis, no clubbing, no edema, no calf tenderness      Neuro   Fully oriented    + dysarthria, hypophonia     Cranial Nerves -  left visual field deficit , mild L facial palsy      Motor -                     LEFT    UE - ShAB 4/5                    RIGHT UE - ShAB 5/5, EF 5/5, EE 5/5,   5/5                    LEFT    LE - 4/5                    RIGHT LE - HF 5/5, KE 5/5, DF 5/5, PF 5/5        Sensory - Intact  to LT on right/left side or bilaterally     Reflexes - DTR Intact, No primitive reflexive     Coordination - FTN intact but slower to left hand     Tone - normal  MSK: Decreased ROM to right shoulder 2/2 osteoarthritis, mild left pronator drift.  Psychiatric - Mood stable, Affect WNL  Skin:  all skin intact      LABS:                        16.7   9.08  )-----------( 261      ( 20 Jul 2023 06:25 )             51.2     07-20    135  |  98  |  23  ----------------------------<  80  3.4<L>   |  26  |  1.17    Ca    9.5      20 Jul 2023 06:25    TPro  8.7<H>  /  Alb  3.7  /  TBili  1.5<H>  /  DBili  x   /  AST  49<H>  /  ALT  55<H>  /  AlkPhos  165<H>  07-20      Urinalysis Basic - ( 20 Jul 2023 06:25 )    Color: x / Appearance: x / SG: x / pH: x  Gluc: 80 mg/dL / Ketone: x  / Bili: x / Urobili: x   Blood: x / Protein: x / Nitrite: x   Leuk Esterase: x / RBC: x / WBC x   Sq Epi: x / Non Sq Epi: x / Bacteria: x        CTH (7/17)   IMPRESSION:  Redemonstration of tube right MCA territory infarct. Redemonstration of petechial hemorrhagic transformation involving the right parietotemporal region.  No hydrocephalus, midline shift, or effacement of the basal cisterns.    CT Head No Cont - 07.19.23  IMPRESSION:  No significant interval change from 7/17/2023.    MEDICATIONS  (STANDING):  apixaban 5 milliGRAM(s) Oral two times a day  artificial tears (preservative free) Ophthalmic Solution 1 Drop(s) Both EYES every 6 hours  aspirin  chewable 81 milliGRAM(s) Oral daily  atorvastatin 80 milliGRAM(s) Oral at bedtime  dapagliflozin 10 milliGRAM(s) Oral every 24 hours  FIRST- Mouthwash  BLM 15 milliLiter(s) Swish and Spit two times a day  nystatin    Suspension 5 milliLiter(s) Oral two times a day  polyethylene glycol 3350 17 Gram(s) Oral daily  senna 2 Tablet(s) Oral at bedtime    MEDICATIONS  (PRN):  acetaminophen     Tablet .. 650 milliGRAM(s) Oral every 6 hours PRN Mild Pain (1 - 3)  melatonin 3 milliGRAM(s) Oral at bedtime PRN Insomnia  oxyCODONE    IR 5 milliGRAM(s) Oral every 4 hours PRN Moderate Pain (4 - 6)  oxyCODONE    IR 10 milliGRAM(s) Oral every 4 hours PRN Severe Pain (7 - 10) Subjective  Patient seen and examined at bedside with Dr. Jain  Admits to sleeping well.  Last BM on 7/20, per patient.  Tolerating Eliquis.   Discussed CTH results.  Discussed plan for DC home 7/22.    No other complaints at this time.    ROS  Denies headache, dizziness, visual changes, chest pain, SOB/TOWNSEND, abdominal pain, nausea, vomiting, diarrhea, dysuria, numbness or tingling.     Therapy--Observed during OT today  Sustained improvement noted, ambulating increasing distance  Details of function as in IDT today  Working on co ordination, endurance and progressive ambulation    Son at bed side stated that patient was meant to commence an injection (once every 3 months) form his cardiologist Dr Ayush Horta here at [facility] prior to his stroke  He gave numbers for patient's cardiology support staff     Vital Signs Last 24 Hrs  T(C): 36.6 (20 Jul 2023 09:53), Max: 36.8 (19 Jul 2023 21:15)  T(F): 97.8 (20 Jul 2023 09:53), Max: 98.2 (19 Jul 2023 21:15)  HR: 58 (20 Jul 2023 09:53) (58 - 66)  BP: 104/69 (20 Jul 2023 09:53) (104/69 - 111/68)  RR: 16 (20 Jul 2023 09:53) (16 - 16)  SpO2: 96% (20 Jul 2023 09:53) (96% - 96%)    EXAM  Gen--Comfortable  HEENT - supple  Neck - Supple, No limited ROM  Pulm - clear   Cardiovascular - RRR, S1S2  Chest - normal heart sounds  Abdomen - Soft, non tender , +BS  Extremities - No Cyanosis, no clubbing, no edema, no calf tenderness    Neuro   Fully oriented    + dysarthria, hypophonia   Cranial Nerves -  left visual field deficit , mild L facial palsy, resolving    Motor -                     LEFT    UE - ShAB 4/5                    RIGHT UE - ShAB 5/5, EF 5/5, EE 5/5,   5/5                    LEFT    LE - 4/5                    RIGHT LE - HF 5/5, KE 5/5, DF 5/5, PF 5/5        Sensory - Intact  to LT on right/left side or bilaterally     Reflexes - DTR Intact, No primitive reflexive     Coordination - FTN intact but slower to left hand     Tone - normal  MSK: Decreased ROM to right shoulder 2/2 osteoarthritis, mild left pronator drift.  Psychiatric - Mood stable, Affect WNL  Skin:  all skin intact    LABS:                        16.7   9.08  )-----------( 261      ( 20 Jul 2023 06:25 )             51.2     07-20    135  |  98  |  23  ----------------------------<  80  3.4<L>   |  26  |  1.17    Ca    9.5      20 Jul 2023 06:25    TPro  8.7<H>  /  Alb  3.7  /  TBili  1.5<H>  /  DBili  x   /  AST  49<H>  /  ALT  55<H>  /  AlkPhos  165<H>  07-20      Urinalysis Basic - ( 20 Jul 2023 06:25 )    Color: x / Appearance: x / SG: x / pH: x  Gluc: 80 mg/dL / Ketone: x  / Bili: x / Urobili: x   Blood: x / Protein: x / Nitrite: x   Leuk Esterase: x / RBC: x / WBC x   Sq Epi: x / Non Sq Epi: x / Bacteria: x      CT (7/17)   IMPRESSION:  Redemonstration of tube right MCA territory infarct. Redemonstration of petechial hemorrhagic transformation involving the right parietotemporal region.  No hydrocephalus, midline shift, or effacement of the basal cisterns.    CT Head No Cont - 07.19.23  IMPRESSION:  No significant interval change from 7/17/2023.    MEDICATIONS  (STANDING):  apixaban 5 milliGRAM(s) Oral two times a day  artificial tears (preservative free) Ophthalmic Solution 1 Drop(s) Both EYES every 6 hours  aspirin  chewable 81 milliGRAM(s) Oral daily  atorvastatin 80 milliGRAM(s) Oral at bedtime  dapagliflozin 10 milliGRAM(s) Oral every 24 hours  FIRST- Mouthwash  BLM 15 milliLiter(s) Swish and Spit two times a day  nystatin    Suspension 5 milliLiter(s) Oral two times a day  polyethylene glycol 3350 17 Gram(s) Oral daily  senna 2 Tablet(s) Oral at bedtime    MEDICATIONS  (PRN):  acetaminophen     Tablet .. 650 milliGRAM(s) Oral every 6 hours PRN Mild Pain (1 - 3)  melatonin 3 milliGRAM(s) Oral at bedtime PRN Insomnia  oxyCODONE    IR 5 milliGRAM(s) Oral every 4 hours PRN Moderate Pain (4 - 6)  oxyCODONE    IR 10 milliGRAM(s) Oral every 4 hours PRN Severe Pain (7 - 10)

## 2023-07-21 ENCOUNTER — TRANSCRIPTION ENCOUNTER (OUTPATIENT)
Age: 63
End: 2023-07-21

## 2023-07-21 VITALS
OXYGEN SATURATION: 95 % | TEMPERATURE: 98 F | SYSTOLIC BLOOD PRESSURE: 104 MMHG | RESPIRATION RATE: 16 BRPM | DIASTOLIC BLOOD PRESSURE: 70 MMHG | HEART RATE: 69 BPM

## 2023-07-21 PROCEDURE — 92610 EVALUATE SWALLOWING FUNCTION: CPT

## 2023-07-21 PROCEDURE — 92523 SPEECH SOUND LANG COMPREHEN: CPT

## 2023-07-21 PROCEDURE — 92507 TX SP LANG VOICE COMM INDIV: CPT

## 2023-07-21 PROCEDURE — 97167 OT EVAL HIGH COMPLEX 60 MIN: CPT

## 2023-07-21 PROCEDURE — 36415 COLL VENOUS BLD VENIPUNCTURE: CPT

## 2023-07-21 PROCEDURE — 90791 PSYCH DIAGNOSTIC EVALUATION: CPT

## 2023-07-21 PROCEDURE — 97116 GAIT TRAINING THERAPY: CPT

## 2023-07-21 PROCEDURE — 85025 COMPLETE CBC W/AUTO DIFF WBC: CPT

## 2023-07-21 PROCEDURE — 97163 PT EVAL HIGH COMPLEX 45 MIN: CPT

## 2023-07-21 PROCEDURE — 99232 SBSQ HOSP IP/OBS MODERATE 35: CPT

## 2023-07-21 PROCEDURE — 80053 COMPREHEN METABOLIC PANEL: CPT

## 2023-07-21 PROCEDURE — 97535 SELF CARE MNGMENT TRAINING: CPT

## 2023-07-21 PROCEDURE — 97110 THERAPEUTIC EXERCISES: CPT

## 2023-07-21 PROCEDURE — 70450 CT HEAD/BRAIN W/O DYE: CPT

## 2023-07-21 PROCEDURE — 85027 COMPLETE CBC AUTOMATED: CPT

## 2023-07-21 PROCEDURE — 97530 THERAPEUTIC ACTIVITIES: CPT

## 2023-07-21 RX ORDER — POTASSIUM CHLORIDE 20 MEQ
20 PACKET (EA) ORAL ONCE
Refills: 0 | Status: COMPLETED | OUTPATIENT
Start: 2023-07-21 | End: 2023-07-21

## 2023-07-21 RX ADMIN — Medication 1 DROP(S): at 12:52

## 2023-07-21 RX ADMIN — DIPHENHYDRAMINE HYDROCHLORIDE AND LIDOCAINE HYDROCHLORIDE AND ALUMINUM HYDROXIDE AND MAGNESIUM HYDRO 15 MILLILITER(S): KIT at 06:24

## 2023-07-21 RX ADMIN — Medication 20 MILLIEQUIVALENT(S): at 12:52

## 2023-07-21 RX ADMIN — POLYETHYLENE GLYCOL 3350 17 GRAM(S): 17 POWDER, FOR SOLUTION ORAL at 12:52

## 2023-07-21 RX ADMIN — Medication 81 MILLIGRAM(S): at 12:52

## 2023-07-21 RX ADMIN — APIXABAN 5 MILLIGRAM(S): 2.5 TABLET, FILM COATED ORAL at 06:24

## 2023-07-21 RX ADMIN — Medication 5 MILLILITER(S): at 06:24

## 2023-07-21 NOTE — PROGRESS NOTE ADULT - SUBJECTIVE AND OBJECTIVE BOX
Medicine Progress Note    Patient is a 63y old  Male who presents with a chief complaint of CVA (21 Jul 2023 11:07)      SUBJECTIVE / OVERNIGHT EVENTS:  no complaints    ADDITIONAL REVIEW OF SYSTEMS:  denied fever/chills/CP/SOB/cough/palpitation/dizziness/abdominal pian/nausea/vomiting/diarrhoea/constipation/dysuria/leg or calf pain/headaches.all other ROS neg    MEDICATIONS  (STANDING):  apixaban 5 milliGRAM(s) Oral two times a day  artificial tears (preservative free) Ophthalmic Solution 1 Drop(s) Both EYES every 6 hours  aspirin  chewable 81 milliGRAM(s) Oral daily  atorvastatin 80 milliGRAM(s) Oral at bedtime  dapagliflozin 10 milliGRAM(s) Oral every 24 hours  FIRST- Mouthwash  BLM 15 milliLiter(s) Swish and Spit two times a day  nystatin    Suspension 5 milliLiter(s) Oral two times a day  polyethylene glycol 3350 17 Gram(s) Oral daily  senna 2 Tablet(s) Oral at bedtime    MEDICATIONS  (PRN):  acetaminophen     Tablet .. 650 milliGRAM(s) Oral every 6 hours PRN Mild Pain (1 - 3)  melatonin 3 milliGRAM(s) Oral at bedtime PRN Insomnia  oxyCODONE    IR 5 milliGRAM(s) Oral every 4 hours PRN Moderate Pain (4 - 6)  oxyCODONE    IR 10 milliGRAM(s) Oral every 4 hours PRN Severe Pain (7 - 10)    CAPILLARY BLOOD GLUCOSE        I&O's Summary      PHYSICAL EXAM:  Vital Signs Last 24 Hrs  T(C): 36.6 (21 Jul 2023 08:59), Max: 36.7 (20 Jul 2023 21:05)  T(F): 97.9 (21 Jul 2023 08:59), Max: 98.1 (20 Jul 2023 21:05)  HR: 69 (21 Jul 2023 08:59) (57 - 69)  BP: 104/70 (21 Jul 2023 08:59) (104/70 - 121/78)  BP(mean): --  RR: 16 (21 Jul 2023 08:59) (16 - 16)  SpO2: 95% (21 Jul 2023 08:59) (95% - 96%)    Parameters below as of 21 Jul 2023 08:59  Patient On (Oxygen Delivery Method): room air        GENERAL: Not in distress. Alert    HEENT: clear conjuctiva, MMM. no pallor or icterus  CARDIOVASCULAR: RRR S1, S2. No murmur/rubs/gallop  LUNGS: BLAE+, no rales, no wheezing, no rhonchi.    ABDOMEN: ND. Soft,  NT, no guarding / rebound / rigidity. BS normoactive  BACK: No spine tenderness.  EXTREMITIES: no edema. no leg or calf TP.  SKIN: warm and dry  PSYCHIATRIC: Calm.  No agitation.  CNS: AAO. moves limbs, follows commands. left sided weakness    LABS:                        16.7   9.08  )-----------( 261      ( 20 Jul 2023 06:25 )             51.2     07-20    135  |  98  |  23  ----------------------------<  80  3.4<L>   |  26  |  1.17    Ca    9.5      20 Jul 2023 06:25    TPro  8.7<H>  /  Alb  3.7  /  TBili  1.5<H>  /  DBili  x   /  AST  49<H>  /  ALT  55<H>  /  AlkPhos  165<H>  07-20          Urinalysis Basic - ( 20 Jul 2023 06:25 )    Color: x / Appearance: x / SG: x / pH: x  Gluc: 80 mg/dL / Ketone: x  / Bili: x / Urobili: x   Blood: x / Protein: x / Nitrite: x   Leuk Esterase: x / RBC: x / WBC x   Sq Epi: x / Non Sq Epi: x / Bacteria: x            RADIOLOGY & ADDITIONAL TESTS:  Imaging from Last 24 Hours:    Electrocardiogram/QTc Interval:    COORDINATION OF CARE:  Care Discussed with Consultants/Other Providers:
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
## 2023-07-21 NOTE — PROGRESS NOTE ADULT - ASSESSMENT
64 y/o M with no PMH who presented to [facility] 7/10 with dysarthria and L hemiparesis; found to have R MCA infarct. Suspected to be cardioembolic in the setting of amyloidosis vs risk of A-fib. Now admitted to [facility] for rehab.     R MCA CVA  -Continue with ASA, statin,   -CTH 7/17 showed petechial hge.  -Neuro consulted. Eliquis resumed. Repeat CTH on 7/19: stable  -Continue comprehensive rehab program - PT/OT/SLP per rehab team  -Pain management, bowel regimen per rehab     Hypokalemia  -Replete and monitor    Amyloidosis   CM  -7/11 TTE: EF 36%. Mild pulmonary Hypertension   -Son reported to rehab team that patient was lasix and Farxiga at home. [facility] chart reviewed. both meds held there. Cardio cx at [facility] noted  -Resumed Farxiga 7/19. Lasix held as patient remains euvolemic   -Unclear why patient is not on other GDMT. [Metoprolol ER or Coreg, MRA, ACEI/ARB/ARNI. can start OP]  -If hypertension noted, can start coreg  -Outpatient cardiac follow up in 1-2 week post-DC  -Eliquis resumed 7/18, tolerated well, CTH 7/19 stable     Hyperlipidemia   -Continue statin, monitor LFTs    DVT ppx - eliquis    d/w rehab team at [facility]

## 2023-07-21 NOTE — DISCHARGE NOTE NURSING/CASE MANAGEMENT/SOCIAL WORK - PATIENT PORTAL LINK FT
You can access the FollowMyHealth Patient Portal offered by [facility] by registering at the following website: [email or web address]. By joining [employer]’s FollowMyHealth portal, you will also be able to view your health information using other applications (apps) compatible with our system.

## 2023-07-21 NOTE — PROGRESS NOTE ADULT - PROVIDER SPECIALTY LIST ADULT
Hospitalist
Rehab Medicine
Hospitalist

## 2023-07-21 NOTE — CONSULT NOTE ADULT - SUBJECTIVE AND OBJECTIVE BOX
General Neurology  Consult Note    HPI:  62yo R handed M PMHx recent amyloidosis diagnosis presented to [facility] on 7/10 for dysarthria and L hemiparesis. Patient found to have a R MCA territory infarct without hemorrhagic conversion. Suspected cardioembolic in origin in the setting of amyloidosis vs risk of a fib. Patient recommended to start full dose AC given hypercoagulable state 2/2 amyloidosis. Recommend CTH on 7/17 to access candidacy to start AC. Patient evaluated by PT/OT and recommended for acute inpatient rehab. Patient is medically stable for discharge to [facility] on 7/14.    CT head done 7/17/23 showed mild petechial hemorrhage in the R parietotemporal region.     The patient reports that he feels well. He is concerned about returning to work. He denies headache, changes in vision. He continues to have mild expressive aphasia. He notes that both his hands had trouble working at the time of his stroke but this has slowly been improving. He is participating in therapy. He denies dizziness/lightheadedness. No LOC.  
Patient seen alone at bedside for 45-minute psychology consultation. Neuropsychologist is introduced as a member of the rehabilitation team and the purpose of the session is explained. Patient agrees to participate.    Per patient, he experienced a stroke last week, while at home. He indicates recalls sense of disbelief associated with having a stroke, and he is thankful for progress made. Patient is looking forward to going home and following-up with his doctors regarding next steps in management of his health.    Medical records are reviewed. Per records: 63-year-old, right-handed, male with PMHx recent amyloidosis diagnosis presented to [facility] on 7/10 for dysarthria and Left hemiparesis. Patient found to have a Right MCA territory infarct without hemorrhagic conversion. Suspected cardioembolic in origin in the setting of amyloidosis vs risk of a fib. Patient recommended to start full dose AC given hypercoagulable state 2/2 amyloidosis. Recommend CTH on 7/17 to access candidacy to start AC. Patient evaluated by PT/OT and recommended for acute inpatient rehab. Patient is medically stable for discharge to [facility] on 7/14.
Patient is a 63y old  Male who presents with a chief complaint of CVA (14 Jul 2023 13:46)    HPI, per admission H&P:  62yo R handed M PMHx recent amyloidosis diagnosis presented to [facility] on 7/10 for dysarthria and L hemiparesis. Patient found to have a R MCA territory infarct without hemorrhagic conversion. Suspected cardioembolic in origin in the setting of amyloidosis vs risk of a fib. Patient recommended to start full dose AC given hypercoagulable state 2/2 amyloidosis. Recommend CTH on 7/17 to access candidacy to start AC. Patient evaluated by PT/OT and recommended for acute inpatient rehab. Patient is medically stable for discharge to [facility] on 7/14. (14 Jul 2023 13:46)    Subjective   Patient seen and examined at bedside. Reviewed medical history and recent hospitalization with patient. Denies pain/discomfort.     PAST MEDICAL & SURGICAL HISTORY:  Osteoarthritis of hip, left  ED (erectile dysfunction)  Lumbar spondylolysis  History of hip surgery right total hip replacement    SOCIAL HISTORY: Denies smoking, alcohol or drug use  FAMILY HISTORY:    ALLERGIES:  No Known Allergies    MEDICATIONS  (STANDING):  artificial tears (preservative free) Ophthalmic Solution 1 Drop(s) Both EYES every 6 hours  aspirin  chewable 81 milliGRAM(s) Oral daily  atorvastatin 80 milliGRAM(s) Oral at bedtime  enoxaparin Injectable 40 milliGRAM(s) SubCutaneous <User Schedule>  FIRST- Mouthwash  BLM 15 milliLiter(s) Swish and Spit two times a day  polyethylene glycol 3350 17 Gram(s) Oral daily  senna 2 Tablet(s) Oral at bedtime    MEDICATIONS  (PRN):  acetaminophen     Tablet .. 650 milliGRAM(s) Oral every 6 hours PRN Mild Pain (1 - 3)  melatonin 3 milliGRAM(s) Oral at bedtime PRN Insomnia  oxyCODONE    IR 5 milliGRAM(s) Oral every 4 hours PRN Moderate Pain (4 - 6)  oxyCODONE    IR 10 milliGRAM(s) Oral every 4 hours PRN Severe Pain (7 - 10)    Review of Systems: Refer to HPI for pertinent positives and negatives. All other ROS reviewed and negative except as otherwise stated above.    Vital Signs Last 24 Hrs  T(F): 98.3 (15 Jul 2023 09:09), Max: 98.5 (14 Jul 2023 17:20)  HR: 65 (15 Jul 2023 09:09) (52 - 74)  BP: 105/71 (15 Jul 2023 09:09) (100/69 - 107/69)  RR: 16 (15 Jul 2023 09:09) (12 - 21)  SpO2: 94% (15 Jul 2023 09:09) (94% - 98%)  I&O's Summary    PHYSICAL EXAM:  GENERAL: NAD, well-groomed  HEAD:  Atraumatic, Normocephalic  EYES: EOMI, PERRL, conjunctiva and sclera clear  ENMT: Moist mucous membranes, Good dentition  NECK: Supple, No JVD  CHEST/LUNG: Clear to auscultation bilaterally, non-labored breathing, good air entry  HEART: RRR; S1/S2, No murmur  ABDOMEN: Soft, Nontender, Nondistended; Bowel sounds present  VASCULAR: Normal pulses, Normal capillary refill  EXTREMITIES: No cyanosis, No edema  LYMPH: No lymphadenopathy noted  SKIN: Warm, Intact  PSYCH: Normal mood and affect  NERVOUS SYSTEM:  awake, alert. +dysarthria, Good concentration; CN 2-12 intact, No focal deficits, moves all extremities. +facial asymmetry     LABS:                        14.7   8.03  )-----------( 227      ( 15 Jul 2023 06:23 )             45.2     07-15    136  |  102  |  15  ----------------------------<  83  3.7   |  24  |  0.92    Ca    9.1      15 Jul 2023 06:23    TPro  7.1  /  Alb  2.9  /  TBili  1.6  /  DBili  x   /  AST  46  /  ALT  58  /  AlkPhos  122  07-15    Urinalysis Basic - ( 15 Jul 2023 06:23 )    Color: x / Appearance: x / SG: x / pH: x  Gluc: 83 mg/dL / Ketone: x  / Bili: x / Urobili: x   Blood: x / Protein: x / Nitrite: x   Leuk Esterase: x / RBC: x / WBC x   Sq Epi: x / Non Sq Epi: x / Bacteria: x    RADIOLOGY & ADDITIONAL TESTS:  7/10 EKG (personally reviewed by me): Sinus rhythm at 81 bpm    Care Discussed with Consultants/Other Providers:

## 2023-07-21 NOTE — DISCHARGE NOTE NURSING/CASE MANAGEMENT/SOCIAL WORK - NSSCTYPOFSERV_GEN_ALL_CORE
You will be evaluated for [facility] services within 48 hours of your discharge date. SOC was accepted for 7/23/23.

## 2023-07-21 NOTE — PROGRESS NOTE ADULT - SUBJECTIVE AND OBJECTIVE BOX
Subjective  Patient seen and examined at bedside.  Admits to sleeping well.  Last BM on 7/21, per patient.  Tolerating Eliquis.   Family wishes for DC home 7/21 after therapies.   Discussed outpatient follow up for urinary hesitancy vs urinary retention- pt agreeable.   No other complaints at this time.    ROS  Denies headache, dizziness, visual changes, chest pain, SOB/TOWNSEND, abdominal pain, nausea, vomiting, diarrhea, dysuria, numbness or tingling.     Therapy--Observed during OT today  Sustained improvement noted, ambulating increasing distance  Details of function as in IDT today  Working on coordination, endurance and progressive ambulation      Vital Signs Last 24 Hrs  T(C): 36.6 (21 Jul 2023 08:59), Max: 36.7 (20 Jul 2023 21:05)  T(F): 97.9 (21 Jul 2023 08:59), Max: 98.1 (20 Jul 2023 21:05)  HR: 69 (21 Jul 2023 08:59) (57 - 69)  BP: 104/70 (21 Jul 2023 08:59) (104/70 - 121/78)  BP(mean): --  RR: 16 (21 Jul 2023 08:59) (16 - 16)  SpO2: 95% (21 Jul 2023 08:59) (95% - 96%)        EXAM  Gen--Comfortable  HEENT - supple  Neck - Supple, No limited ROM  Pulm - clear   Cardiovascular - RRR, S1S2  Chest - normal heart sounds  Abdomen - Soft, non tender , +BS  Extremities - No Cyanosis, no clubbing, no edema, no calf tenderness    Neuro   Fully oriented    + dysarthria, hypophonia   Cranial Nerves -  left visual field deficit , mild L facial palsy, resolving    Motor -                     LEFT    UE - ShAB 4/5                    RIGHT UE - ShAB 5/5, EF 5/5, EE 5/5,   5/5                    LEFT    LE - 4/5                    RIGHT LE - HF 5/5, KE 5/5, DF 5/5, PF 5/5        Sensory - Intact  to LT on right/left side or bilaterally     Reflexes - DTR Intact, No primitive reflexive     Coordination - FTN intact but slower to left hand     Tone - normal  MSK: Decreased ROM to right shoulder 2/2 osteoarthritis, mild left pronator drift.  Psychiatric - Mood stable, Affect WNL  Skin:  all skin intact    LABS:                        16.7   9.08  )-----------( 261      ( 20 Jul 2023 06:25 )             51.2     07-20    135  |  98  |  23  ----------------------------<  80  3.4<L>   |  26  |  1.17    Ca    9.5      20 Jul 2023 06:25    TPro  8.7<H>  /  Alb  3.7  /  TBili  1.5<H>  /  DBili  x   /  AST  49<H>  /  ALT  55<H>  /  AlkPhos  165<H>  07-20      Urinalysis Basic - ( 20 Jul 2023 06:25 )    Color: x / Appearance: x / SG: x / pH: x  Gluc: 80 mg/dL / Ketone: x  / Bili: x / Urobili: x   Blood: x / Protein: x / Nitrite: x   Leuk Esterase: x / RBC: x / WBC x   Sq Epi: x / Non Sq Epi: x / Bacteria: x      CTH (7/17)   IMPRESSION:  Redemonstration of tube right MCA territory infarct. Redemonstration of petechial hemorrhagic transformation involving the right parietotemporal region.  No hydrocephalus, midline shift, or effacement of the basal cisterns.    CT Head No Cont - 07.19.23  IMPRESSION:  No significant interval change from 7/17/2023.    MEDICATIONS  (STANDING):  apixaban 5 milliGRAM(s) Oral two times a day  artificial tears (preservative free) Ophthalmic Solution 1 Drop(s) Both EYES every 6 hours  aspirin  chewable 81 milliGRAM(s) Oral daily  atorvastatin 80 milliGRAM(s) Oral at bedtime  dapagliflozin 10 milliGRAM(s) Oral every 24 hours  FIRST- Mouthwash  BLM 15 milliLiter(s) Swish and Spit two times a day  nystatin    Suspension 5 milliLiter(s) Oral two times a day  polyethylene glycol 3350 17 Gram(s) Oral daily  senna 2 Tablet(s) Oral at bedtime    MEDICATIONS  (PRN):  acetaminophen     Tablet .. 650 milliGRAM(s) Oral every 6 hours PRN Mild Pain (1 - 3)  melatonin 3 milliGRAM(s) Oral at bedtime PRN Insomnia  oxyCODONE    IR 5 milliGRAM(s) Oral every 4 hours PRN Moderate Pain (4 - 6)  oxyCODONE    IR 10 milliGRAM(s) Oral every 4 hours PRN Severe Pain (7 - 10)

## 2023-07-21 NOTE — PROGRESS NOTE ADULT - ASSESSMENT
Assessment/Plan:  WILFRIDO IRVIN is a 63y PMHx recent amyloidosis diagnosis presented to [facility] on 7/10 for dysarthria and L hemiparesis. Patient found to have a R MCA territory infarct without hemorrhagic conversion. Patient now admitted for a multidisciplinary rehab program. 07-14-23 @ 13:49    * Tolerating Eliquis - repeat CTH stable  * Pt with urinary heistancy vs retention - outpatient follow up with PCP/URO referral  * Family wants DC home 7/21     R MCA CVA with left Hemiparesis   - ASA 81mg qd  - Lipitor 80mg qd  - Precautions: Aspiration, fall  - Continue comprehensive rehab program of PT/OT/SLP - 3 hours a day, 5 days a week. P&O as needed       Dysphagia  - Soft and bite sized diet with thins  - SLP consult  - Bedside swallow eval    Amylodosis--Recent Dx  --CTH 7/17, no interval change  --Neurologist Dr France, no objection to commencing AC as recs by cardiology  - Eliquis resumed on 7/18 - tolerating well  - CTH (7/19) post Eliquis resumption stable with no significant change   --* F/U with his cardiologist Dr Ayush Horta and support workers (Ms Hernandez ph  and Ms Lucia --for details regarding cardiology treatments for his amylodosis, post acute rehab treatment) (no response phone call to both numbers)     HLD  - cont statin    Pain  - Tylenol/oxycodone PRN    Sleep  - Melatonin PRN     GI / Bowel  - Senna qHS  - Miralax PRN Daily     / Bladder  - Possible hesitancy vs retention  - Pt deferred inpatient workup during rehab stay  - Agreeable to follow up outpatient with PCP/Uro referral    Skin / Pressure injury--unremarkable    Diet/Dysphagia:  - Diet Consistency: Soft and bite sized  - Aspiration Precautions  - SLP consult for swallow function evaluation and treatment  - Nutrition consult    DVT prophylaxis/Amyloisosis:   --Apixiban\  7/20/23--Labs reviewed, normal cbc renal and liver function  CT head post AC commencement, unremarkable    ---------------  Dr. Jain's Liason with family/providers:    7/17--Patient's daughter asked questions on patient's care, which were all answers including anticipated duration of treatment, expectations regarding functional recovery   We discussed findings from review, plan for neurology consult to evaluate need for anticoagulation. She was happy with the explanation  She also gave family background information and support network available to patient post discharge     7/20--Son at bedside, I gave him functional update  He gave some details of patient's cardiologist and support staff. I was unable to contact them at this time  However, patient will f/u with them post d/c from acute rehab    7/18--Liaison with other providers  Dr Sebastian, Neurology--no objection to commencing anticoagulation as recs by cardiology  Hx of amyloidosis   ---------------  IDT conference on 7/20  TDD: 7/22 to home   Barriers: Flat affect, visual deficits, emotionally labile.  Social Work: Lives in [facility] [facility] with 0STE, 16 STI but elevator access. Daughter available for support.   DME: Needs shower chair.   OT: Setup-supervision for ADLs/transfers. CGA for shower transfers.   PT: Sup for transfers; ambulated 120+ feet with no device supervision level; negotiated 8 stairs with 2 HR with supervision.   SLP: Reg w/ TLs. Moderate cog deficits.  ---------------  Outpatient Follow-up:    Sarah Damian  NP in Family Health  [address]  [place] [address]  Phone: [phone]  Fax: [phone]  Follow Up Time: 2 weeks    Ayush Romero  Cardiology  [address]  [place] [address]  Phone: [phone]  Fax: [phone]  Follow Up Time: 2 weeks  ---------------

## 2023-07-21 NOTE — DISCHARGE NOTE NURSING/CASE MANAGEMENT/SOCIAL WORK - NSDCFUADDAPPT_GEN_ALL_CORE_FT
Please follow up with your PCP as soon as possible.     MD: Dr. Ayush Romero  Phone: [phone]    If you are in need of a PCP or a specialist in your area: contact the [facility] Physician Referral Service at [phone].

## 2023-07-25 ENCOUNTER — NON-APPOINTMENT (OUTPATIENT)
Age: 63
End: 2023-07-25

## 2023-07-28 ENCOUNTER — APPOINTMENT (OUTPATIENT)
Dept: CARDIOLOGY | Facility: CLINIC | Age: 63
End: 2023-07-28
Payer: COMMERCIAL

## 2023-07-28 ENCOUNTER — NON-APPOINTMENT (OUTPATIENT)
Age: 63
End: 2023-07-28

## 2023-07-28 VITALS
HEART RATE: 75 BPM | BODY MASS INDEX: 27.07 KG/M2 | SYSTOLIC BLOOD PRESSURE: 110 MMHG | DIASTOLIC BLOOD PRESSURE: 80 MMHG | WEIGHT: 178 LBS | OXYGEN SATURATION: 100 %

## 2023-07-28 PROCEDURE — 93000 ELECTROCARDIOGRAM COMPLETE: CPT

## 2023-07-28 PROCEDURE — 99214 OFFICE O/P EST MOD 30 MIN: CPT | Mod: 25

## 2023-07-28 NOTE — DISCUSSION/SUMMARY
[FreeTextEntry1] : He is a 63 year old [place]ian man with newly diagnosed hATTR cardiac amyloidosis and recent ischemic stroke who presents today for follow up. \par \par Recent cardiac testing as above.\par \par Will start stabilizer therapy with Vyndamax 61 mg daily.\par For a patient with pathogenic genetic variant will plan to begin silencer therapy with Amvuttra as well. First dose is scheduled for 8/2/2023.\par \par We discussed the pathophysiology of cardiac amyloidosis as well as the possible symptoms, treatment modalities, prognosis and necessity for genetic evaluation for first degree relatives. \par \par Given his exertional dyspnea, he will continue Farxiga 10mg daily for additive diuretic effect in a patient with reduced EF. \par \par He has been encouraged to keep a daily weight log and will notify the office for any weight gain > 3 pounds. Labs today. \par \par ECG today shows supraventricular tachycardia at 129 bpm with unclear atrial activity.  ECG performed just moments later reveals normal sinus rhythm at 75 bpm. Continue atorvastatin 80 mg nightly. \par \par Given his recent ischemic stroke he has been outfitted with a 7-day cardiac monitor to define the burden of his atrial arrhythmia.\par \par Follow up in the office in one month.  [EKG obtained to assist in diagnosis and management of assessed problem(s)] : EKG obtained to assist in diagnosis and management of assessed problem(s)

## 2023-07-28 NOTE — REASON FOR VISIT
[Other: ____] : [unfilled] [Other: _____] : [unfilled] [FreeTextEntry1] : 7/28/2023\lazara Lozano returns today for follow up. He is accompanied by his son.\par On 7/10/2023 he presented to [facility] with dysarthria and left hemiparesis. He was found to have a right MCA territory infarct without hemorrhagic conversion. The event was suspected to be cardioembolic in origin in the setting of his amyloidosis and risk of atrial fibrillation. He was started on full dose oral anticoagulation and high intensity statin therapy. Since discharge he has been feeling much better. HIs strength is improving and he is now working with home physical therapy. His first injection of Amvuttra is scheduled for 8/2/2023.

## 2023-07-28 NOTE — CARDIOLOGY SUMMARY
[de-identified] : 3/15/2023. Normal sinus rhythm at 77 BPM. Low voltage in the limb leads. Nonspecific ST and T abnormality. \par 4/12/2023. NSR at 71 BPM. LAE. Poor R-wave progression. Low voltage in the limb leads. Unchanged from prior tracing. \par 7/28/2023.  Normal sinus rhythm at 75 bpm.  Left atrial rhythm.  Low voltage in limb leads.\par 7/28/2023.  Supraventricular tachycardia at 129 bpm.  Unclear atrial activity.  Compared with prior ECG the SVT is new. [de-identified] : 1/18/2023. TTE. LVEF 43%.\par 1. The entire septum is abnormal.\par 2. Left ventricular ejection fraction is mildly reduced with an LVEF of 43% by Harrington's biplant method. \par 3. There is moderate concentric left ventricular hypertrophy.\par 4. Severe (Grade 3) left ventricular diastolic dysfunction.\par 5. Moderate mitral valve regurgitation.\par 6. No significant valvular disease.\par 7. Mild thickening of the aortic valve leaflets.\par 8. No pericardial effusion seen. [de-identified] : 2/21/2023. Cardiac MRI.\par 1. Suspect cardiac amyloidosis; qualitative reduced LV/RV systolic function and global hypokineses with focal hypertrophy (next wall thickness 17 mm). \par 2. Abnormal parametric values of T1, T2 and LGE in a classic pattern for amyloidosis. \par 3. The cardiac valves and pericardium are unremarkable.\par 4. The extra cardiac is unremarkable. [de-identified] : 3/22/2023. PYP scan. Cardiac imaging study is strongly suggestive of transthyretin cardiac amyloidosis.
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
## 2023-07-31 ENCOUNTER — APPOINTMENT (OUTPATIENT)
Dept: NEUROLOGY | Facility: CLINIC | Age: 63
End: 2023-07-31
Payer: COMMERCIAL

## 2023-07-31 VITALS
HEIGHT: 68 IN | DIASTOLIC BLOOD PRESSURE: 73 MMHG | BODY MASS INDEX: 26.52 KG/M2 | WEIGHT: 175 LBS | HEART RATE: 98 BPM | SYSTOLIC BLOOD PRESSURE: 114 MMHG

## 2023-07-31 PROCEDURE — 99205 OFFICE O/P NEW HI 60 MIN: CPT

## 2023-08-01 LAB
NT-PROBNP SERPL-MCNC: 163 PG/ML
PREALB SERPL NEPH-MCNC: 16 MG/DL

## 2023-08-01 NOTE — PHYSICAL EXAM
[FreeTextEntry1] : Neurologic Exam: Mental status - Sitting in chair. Awake and alert, Oriented to self and place. Language: Mild dysarthria, speech fluent, repetition intact.  Cranial nerves - PERRLA, left visual field deficit (LHH), mild L facial palsy, EOMI, face sensation (V1-V3) intact b/l, facial strength intact without asymmetry b/l, hearing intact b/l, palate with symmetric elevation, trapezius 5/5 strength b/l, tongue midline  Motor - Normal bulk and tone throughout.  Mild left pronator drift.  LLE antigravity. RUE and RLE move against gravity with no drifts.   Sensation - Light touch intact throughout Coordination - Finger to nose intact b/l. Slower fast finger movement on L than R. No tremors appreciated Gait: Unsteady.  [General Appearance - Alert] : alert [General Appearance - In No Acute Distress] : in no acute distress [Impaired Insight] : insight and judgment were intact [Affect] : the affect was normal [Sclera] : the sclera and conjunctiva were normal [Extraocular Movements] : extraocular movements were intact [Outer Ear] : the ears and nose were normal in appearance [Examination Of The Oral Cavity] : the lips and gums were normal [Neck Appearance] : the appearance of the neck was normal [Auscultation Breath Sounds / Voice Sounds] : lungs were clear to auscultation bilaterally [Heart Sounds] : normal S1 and S2 [Nail Clubbing] : no clubbing  or cyanosis of the fingernails [Musculoskeletal - Swelling] : no joint swelling seen [Motor Tone] : muscle strength and tone were normal [Skin Color & Pigmentation] : normal skin color and pigmentation [Skin Turgor] : normal skin turgor [] : no rash

## 2023-08-01 NOTE — HISTORY OF PRESENT ILLNESS
[FreeTextEntry1] : He is a 63 year old right handed gentleman.  HPI from [facility] 7/10/23: He has aPMHx recent amyloidosis diagnosis presents to ED for dysarthria, L hemiparesis. Patient accompanied by daughter and wife who all state LKW 9PM 7/9/23. Patient during yesterday felt generally tired as he does from amyloidosis diagnosis but was able to ambulate to [place], go to the beach. Then after 9PM last night, he states he might have felt a little weak in his "R leg" (but suspect L leg given exam) and slipped in the bathroom and was down for <1 hour and found by spouse. Then today AM when daughter returned from [place], she noted he was slurring with weakness for which he came to ED. Not on ac/ ap agents. Here, NIHSS 9. preMRS 0.   Workup at [facility] includes: - MRI Brain 7/13/23: Evolving acute right MCA territorial infarct.  CT ANGIOGRAPHY NECK: 1.  Again seen is a beaded appearance of both internal carotid arteries,  suspicious for fibromuscular dysplasia. 2.  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  CT ANGIOGRAPHY BRAIN:   Compared to the prior CTA on 07/10/2023, there is  flow related enhancement within the inferior division of the right MCA at  site of prior occlusion.  Slightly distal to the location of the original  occlusion, there are now severe stenoses versus focal occlusions within  two proximal M2 branches of the inferior division of the right middle  cerebral artery and there is now a distal M2 occlusion in the inferior division of the right middle  cerebral artery.  - LE doppler neg for DVT.  -  TTE: Mild LVH,  There is increased LV mass and concentric hypertrophy, EF 36%, global left ventricular hypokinesis. No evidence of a thrombus in the left ventricle. There is severe (grade 3) left ventricular diastolic dysfunction. Moderate-severely enlarged right ventricular cavity size, normal right ventricular wall thickness and reduced systolic right ventricular function. Mild to moderate MR, Trace AR ,  RA is severely dilated, mild pulmonary hypertension, No PFO. Continue with cardiac monitoring. Patients cardiologist, Dr. Romero, recommending AC for amyloidosis, which increases risk for afib.       7/31/23 He presents today with his wife and daughter. He completed two weeks of acute rehab at [facility]. He is getting his first amyloidosis shot this week. He has been experiencing weakness of his hands.  PCP Dr. Ayush Romero

## 2023-08-01 NOTE — DISCUSSION/SUMMARY
[FreeTextEntry1] : Impression: Dysarthria, LHH, L hemiparesis from R MCA territory ischemic stroke s/p R M1/M2 thrombectomy on 7/10/23. Mechanism: cardioembolic i/s/o amyloidosis and risk of afib vs cardiac amyloidosis    - Overall he is neurologically stable.  - Due to amyloidosis being a hypercoagulable state, he was started on anticoagulation with Eliquis: - He will  continue to manage amyloidosis with cardiology. - He should benefit from aggressive vascular risk factor control. Target LDL <70. - He should benefit from PT/OT/Speech therapy. I have provided him with a letter of medical necessity for continued PT/OT/speech therapy.  He can follow up in 1-2 months with [facility]. I hope he remains free of further serious trouble.

## 2023-08-09 ENCOUNTER — APPOINTMENT (OUTPATIENT)
Dept: CARDIOLOGY | Facility: CLINIC | Age: 63
End: 2023-08-09
Payer: COMMERCIAL

## 2023-08-09 VITALS
HEIGHT: 68 IN | TEMPERATURE: 97.8 F | BODY MASS INDEX: 27.13 KG/M2 | HEART RATE: 94 BPM | SYSTOLIC BLOOD PRESSURE: 114 MMHG | OXYGEN SATURATION: 98 % | WEIGHT: 179 LBS | DIASTOLIC BLOOD PRESSURE: 81 MMHG

## 2023-08-09 PROCEDURE — 99215 OFFICE O/P EST HI 40 MIN: CPT | Mod: 25

## 2023-08-09 NOTE — HISTORY OF PRESENT ILLNESS
[FreeTextEntry1] : WILFRIDO IRVIN 62-year M HTN HLD. TTR Amyloid variant. BMI 28 EKG atrial enlargement, low voltage, transaminitis (normalized off testosterone supplement). 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  3/23 Amyloid TTR nuc (pyroPO4) scan positive 7/10/23 stroke embolectomy.     /81 HR 94. Residual gait unsteadiness, intention tremor and dysdiadochokinesis, no hemiparesis but dysphasic speech 6/5/23 NSR 85/min LAE low voltage, Q 3, AVF (unchanged).  7/28/23 12:34:14 NSR 75/min 7/28/23 12:37:11 Atrial Flutter 3:1 AV conduction 129/min 1/18/23 Echo, CLVH moderate, LAE, MR moderate, DD3; LVEF 43%.   7/23 Echo LVEF 36% (BNP normal throughout); marked PATRICIA no PFO,  8/9/23 Alivecor Atrial Fibrillation 108/min. CT Chest PAPVR YOGESH Carotids (angio) [facility] Meds apixaban 5 BID, dapagliflozin 10, Atorvastatin 80. RTC 3m. No ASA no atheroma.  No lasix. Previously (and currently, personal communication, Dr. Romero) on vyndamax (tafamidis) 61mg; now amvuttra 25 mg Q3m (vutrisuran)

## 2023-08-09 NOTE — PHYSICAL EXAM
[Well Nourished] : well nourished [No Acute Distress] : no acute distress [Normal Conjunctiva] : normal conjunctiva [Normal Venous Pressure] : normal venous pressure [No Carotid Bruit] : no carotid bruit [Normal S1, S2] : normal S1, S2 [No Murmur] : no murmur [No Rub] : no rub [No Gallop] : no gallop [Clear Lung Fields] : clear lung fields [Good Air Entry] : good air entry [No Respiratory Distress] : no respiratory distress  [Soft] : abdomen soft [Non Tender] : non-tender [No Masses/organomegaly] : no masses/organomegaly [Normal Bowel Sounds] : normal bowel sounds [Normal Gait] : normal gait [No Edema] : no edema [No Cyanosis] : no cyanosis [No Clubbing] : no clubbing [No Varicosities] : no varicosities [No Rash] : no rash [No Skin Lesions] : no skin lesions [Moves all extremities] : moves all extremities [No Focal Deficits] : no focal deficits [Normal Speech] : normal speech [Alert and Oriented] : alert and oriented [Normal memory] : normal memory [de-identified] : Macroglossia, periorbital ecchymoses [de-identified] : No periorbital ecchymosis

## 2023-08-24 ENCOUNTER — NON-APPOINTMENT (OUTPATIENT)
Age: 63
End: 2023-08-24

## 2023-08-24 ENCOUNTER — APPOINTMENT (OUTPATIENT)
Dept: CARDIOLOGY | Facility: CLINIC | Age: 63
End: 2023-08-24
Payer: COMMERCIAL

## 2023-08-24 VITALS
BODY MASS INDEX: 27.28 KG/M2 | HEART RATE: 96 BPM | SYSTOLIC BLOOD PRESSURE: 115 MMHG | DIASTOLIC BLOOD PRESSURE: 80 MMHG | HEIGHT: 68 IN | WEIGHT: 180 LBS | OXYGEN SATURATION: 98 % | RESPIRATION RATE: 17 BRPM

## 2023-08-24 PROCEDURE — 99215 OFFICE O/P EST HI 40 MIN: CPT | Mod: 25

## 2023-08-24 PROCEDURE — 93000 ELECTROCARDIOGRAM COMPLETE: CPT

## 2023-08-24 RX ORDER — COLD-HOT PACK
EACH MISCELLANEOUS DAILY
Qty: 90 | Refills: 0 | Status: ACTIVE | COMMUNITY
Start: 2023-08-24 | End: 1900-01-01

## 2023-08-24 RX ORDER — DAPAGLIFLOZIN 10 MG/1
10 TABLET, FILM COATED ORAL
Qty: 90 | Refills: 3 | Status: DISCONTINUED | COMMUNITY
Start: 2023-06-06 | End: 2023-08-24

## 2023-08-25 ENCOUNTER — APPOINTMENT (OUTPATIENT)
Dept: PHYSICAL MEDICINE AND REHAB | Facility: CLINIC | Age: 63
End: 2023-08-25
Payer: COMMERCIAL

## 2023-08-25 ENCOUNTER — NON-APPOINTMENT (OUTPATIENT)
Age: 63
End: 2023-08-25

## 2023-08-25 VITALS
BODY MASS INDEX: 27.28 KG/M2 | OXYGEN SATURATION: 99 % | WEIGHT: 180 LBS | HEART RATE: 86 BPM | SYSTOLIC BLOOD PRESSURE: 132 MMHG | DIASTOLIC BLOOD PRESSURE: 67 MMHG | TEMPERATURE: 97.9 F | HEIGHT: 68 IN

## 2023-08-25 PROCEDURE — 99215 OFFICE O/P EST HI 40 MIN: CPT
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
## 2023-08-25 NOTE — CARDIOLOGY SUMMARY
[de-identified] : 3/15/2023. Normal sinus rhythm at 77 BPM. Low voltage in the limb leads. Nonspecific ST and T abnormality.  4/12/2023. NSR at 71 BPM. LAE. Poor R-wave progression. Low voltage in the limb leads. Unchanged from prior tracing.  7/28/2023.  Normal sinus rhythm at 75 bpm.  Left atrial rhythm.  Low voltage in limb leads. 8/24/2023. AF. Low voltage in the limb leads. 7/28/2023.  Supraventricular tachycardia at 129 bpm.  Unclear atrial activity.  Compared with prior ECG the SVT is new. [de-identified] : 1/18/2023. TTE. LVEF 43%.\par  1. The entire septum is abnormal.\par  2. Left ventricular ejection fraction is mildly reduced with an LVEF of 43% by Harrington's biplant method. \par  3. There is moderate concentric left ventricular hypertrophy.\par  4. Severe (Grade 3) left ventricular diastolic dysfunction.\par  5. Moderate mitral valve regurgitation.\par  6. No significant valvular disease.\par  7. Mild thickening of the aortic valve leaflets.\par  8. No pericardial effusion seen. [de-identified] : 2/21/2023. Cardiac MRI.\par  1. Suspect cardiac amyloidosis; qualitative reduced LV/RV systolic function and global hypokineses with focal hypertrophy (next wall thickness 17 mm). \par  2. Abnormal parametric values of T1, T2 and LGE in a classic pattern for amyloidosis. \par  3. The cardiac valves and pericardium are unremarkable.\par  4. The extra cardiac is unremarkable. [de-identified] : 3/22/2023. PYP scan. Cardiac imaging study is strongly suggestive of transthyretin cardiac amyloidosis.

## 2023-08-25 NOTE — REASON FOR VISIT
[Other: ____] : [unfilled] [Other: _____] : [unfilled] [FreeTextEntry1] : 8/24/2023 John returns today for scheduled follow up.  Since last visit he has been feeling generally well.  He continues to work with both speech and physical therapy and is now in the process of transitioning to outpatient treatment. He received his first injection of Amvuttra in early August and reports that he has not noticed any meaningful difference in his symptoms. Following his recent stroke, he wore an extended cardiac monitor which showed atrial fibrillation about 80% of the time along with some brief bursts of SVT. He has not had any palpitations or lightheadedness, but does believe that he may feel more tired than before.

## 2023-08-25 NOTE — DISCUSSION/SUMMARY
[EKG obtained to assist in diagnosis and management of assessed problem(s)] : EKG obtained to assist in diagnosis and management of assessed problem(s) [FreeTextEntry1] : He is a 63 year old [place] man with newly diagnosed hATTR cardiac amyloidosis and recent ischemic stroke who presents today for follow up.   Recent cardiac testing as above.  Will start stabilizer therapy with Vyndamax 61 mg daily. Therapy has not been started due to ongoing insurance issues. For a patient with pathogenic genetic variant will plan to begin silencer therapy with Amvuttra as well. Next dose is due in September.   Given his exertional dyspnea, he will continue Farxiga 10mg daily for additive diuretic effect in a patient with reduced EF.   He has been encouraged to keep a daily weight log and will notify the office for any weight gain > 3 pounds.  ECG today shows atrial fibrillation. Prior cardiac monitor showed atrial fibrillation 80% of the time with brief bursts of SVT. Will plan to discuss with Davidson Morgan MD for consideration of possible ablation.  Continue atorvastatin 80 mg nightly.  Follow up in 3 months, sooner if necessary.

## 2023-09-15 ENCOUNTER — APPOINTMENT (OUTPATIENT)
Dept: CARDIOLOGY | Facility: CLINIC | Age: 63
End: 2023-09-15
Payer: COMMERCIAL

## 2023-09-15 ENCOUNTER — LABORATORY RESULT (OUTPATIENT)
Age: 63
End: 2023-09-15

## 2023-09-15 VITALS
WEIGHT: 176 LBS | DIASTOLIC BLOOD PRESSURE: 82 MMHG | HEIGHT: 68 IN | TEMPERATURE: 98 F | HEART RATE: 102 BPM | OXYGEN SATURATION: 98 % | SYSTOLIC BLOOD PRESSURE: 127 MMHG | BODY MASS INDEX: 26.67 KG/M2

## 2023-09-15 DIAGNOSIS — Z15.89 GENETIC SUSCEPTIBILITY TO OTHER DISEASE: ICD-10-CM

## 2023-09-15 PROCEDURE — 99215 OFFICE O/P EST HI 40 MIN: CPT

## 2023-09-19 LAB — IGA 24H UR QL IFE: NORMAL

## 2023-09-20 ENCOUNTER — APPOINTMENT (OUTPATIENT)
Dept: INTERNAL MEDICINE | Facility: CLINIC | Age: 63
End: 2023-09-20
Payer: COMMERCIAL

## 2023-09-20 ENCOUNTER — APPOINTMENT (OUTPATIENT)
Dept: CARDIOLOGY | Facility: CLINIC | Age: 63
End: 2023-09-20

## 2023-09-20 VITALS
HEART RATE: 95 BPM | WEIGHT: 179 LBS | DIASTOLIC BLOOD PRESSURE: 68 MMHG | HEIGHT: 68 IN | SYSTOLIC BLOOD PRESSURE: 100 MMHG | TEMPERATURE: 98 F | BODY MASS INDEX: 27.13 KG/M2 | OXYGEN SATURATION: 99 %

## 2023-09-20 PROCEDURE — 99214 OFFICE O/P EST MOD 30 MIN: CPT

## 2023-09-20 RX ORDER — FUROSEMIDE 20 MG/1
20 TABLET ORAL DAILY
Qty: 180 | Refills: 3 | Status: DISCONTINUED | COMMUNITY
Start: 2023-04-12 | End: 2023-08-24

## 2023-09-21 RX ORDER — ZOSTER VACCINE RECOMBINANT, ADJUVANTED 50 MCG/0.5
50 KIT INTRAMUSCULAR
Qty: 1 | Refills: 1 | Status: ACTIVE | COMMUNITY
Start: 2023-09-21 | End: 1900-01-01

## 2023-10-02 ENCOUNTER — APPOINTMENT (OUTPATIENT)
Dept: NEUROLOGY | Facility: CLINIC | Age: 63
End: 2023-10-02

## 2023-10-09 ENCOUNTER — APPOINTMENT (OUTPATIENT)
Dept: NEUROLOGY | Facility: CLINIC | Age: 63
End: 2023-10-09
Payer: COMMERCIAL

## 2023-10-09 VITALS
HEIGHT: 68 IN | HEART RATE: 106 BPM | SYSTOLIC BLOOD PRESSURE: 108 MMHG | DIASTOLIC BLOOD PRESSURE: 78 MMHG | BODY MASS INDEX: 27.13 KG/M2 | WEIGHT: 179 LBS

## 2023-10-09 PROCEDURE — 99214 OFFICE O/P EST MOD 30 MIN: CPT

## 2023-10-09 RX ORDER — OMEGA-3/DHA/EPA/FISH OIL 300-1000MG
400 CAPSULE ORAL
Qty: 180 | Refills: 1 | Status: ACTIVE | COMMUNITY
Start: 2023-10-09 | End: 1900-01-01

## 2023-10-23 ENCOUNTER — APPOINTMENT (OUTPATIENT)
Dept: CARDIOLOGY | Facility: CLINIC | Age: 63
End: 2023-10-23

## 2023-11-03 ENCOUNTER — NON-APPOINTMENT (OUTPATIENT)
Age: 63
End: 2023-11-03

## 2023-11-03 ENCOUNTER — APPOINTMENT (OUTPATIENT)
Dept: PHYSICAL MEDICINE AND REHAB | Facility: CLINIC | Age: 63
End: 2023-11-03
Payer: COMMERCIAL

## 2023-11-03 VITALS
DIASTOLIC BLOOD PRESSURE: 71 MMHG | RESPIRATION RATE: 16 BRPM | HEIGHT: 68 IN | OXYGEN SATURATION: 99 % | SYSTOLIC BLOOD PRESSURE: 124 MMHG | HEART RATE: 83 BPM | BODY MASS INDEX: 26.37 KG/M2 | WEIGHT: 174 LBS

## 2023-11-03 DIAGNOSIS — I69.322 DYSARTHRIA FOLLOWING CEREBRAL INFARCTION: ICD-10-CM

## 2023-11-03 DIAGNOSIS — R49.0 DYSPHONIA: ICD-10-CM

## 2023-11-03 PROCEDURE — 99214 OFFICE O/P EST MOD 30 MIN: CPT

## 2023-11-07 ENCOUNTER — APPOINTMENT (OUTPATIENT)
Dept: INTERNAL MEDICINE | Facility: CLINIC | Age: 63
End: 2023-11-07
Payer: COMMERCIAL

## 2023-11-07 VITALS
TEMPERATURE: 97.5 F | HEIGHT: 68 IN | SYSTOLIC BLOOD PRESSURE: 101 MMHG | HEART RATE: 100 BPM | DIASTOLIC BLOOD PRESSURE: 67 MMHG | BODY MASS INDEX: 26.37 KG/M2 | OXYGEN SATURATION: 95 % | WEIGHT: 174 LBS

## 2023-11-07 DIAGNOSIS — B35.4 TINEA CORPORIS: ICD-10-CM

## 2023-11-07 PROCEDURE — 99214 OFFICE O/P EST MOD 30 MIN: CPT | Mod: 25

## 2023-11-07 RX ORDER — ATORVASTATIN CALCIUM 40 MG/1
40 TABLET, FILM COATED ORAL
Qty: 90 | Refills: 3 | Status: DISCONTINUED | COMMUNITY
Start: 2023-07-28 | End: 2023-11-07

## 2023-11-07 RX ORDER — KETOCONAZOLE 20 MG/G
2 CREAM TOPICAL TWICE DAILY
Qty: 1 | Refills: 0 | Status: ACTIVE | COMMUNITY
Start: 2023-11-07 | End: 1900-01-01

## 2023-11-09 ENCOUNTER — APPOINTMENT (OUTPATIENT)
Dept: CARDIOLOGY | Facility: CLINIC | Age: 63
End: 2023-11-09
Payer: COMMERCIAL

## 2023-11-09 ENCOUNTER — NON-APPOINTMENT (OUTPATIENT)
Age: 63
End: 2023-11-09

## 2023-11-09 VITALS
SYSTOLIC BLOOD PRESSURE: 111 MMHG | BODY MASS INDEX: 28.28 KG/M2 | DIASTOLIC BLOOD PRESSURE: 75 MMHG | WEIGHT: 186 LBS | HEART RATE: 110 BPM | OXYGEN SATURATION: 100 %

## 2023-11-09 LAB
CHOLEST SERPL-MCNC: 89 MG/DL
HDLC SERPL-MCNC: 43 MG/DL
LDLC SERPL CALC-MCNC: 29 MG/DL
NONHDLC SERPL-MCNC: 46 MG/DL
TRIGL SERPL-MCNC: 82 MG/DL

## 2023-11-09 PROCEDURE — 93000 ELECTROCARDIOGRAM COMPLETE: CPT

## 2023-11-09 PROCEDURE — 99215 OFFICE O/P EST HI 40 MIN: CPT | Mod: 25

## 2023-11-09 RX ORDER — EMPAGLIFLOZIN 25 MG/1
25 TABLET, FILM COATED ORAL DAILY
Qty: 90 | Refills: 3 | Status: ACTIVE | COMMUNITY
Start: 2023-08-24 | End: 1900-01-01

## 2023-11-13 LAB
ALBUMIN SERPL ELPH-MCNC: 3.7 G/DL
ALP BLD-CCNC: 166 U/L
ALT SERPL-CCNC: 98 U/L
ANION GAP SERPL CALC-SCNC: 12 MMOL/L
AST SERPL-CCNC: 72 U/L
BASOPHILS # BLD AUTO: 0.1 K/UL
BASOPHILS NFR BLD AUTO: 0.8 %
BILIRUB SERPL-MCNC: 1.2 MG/DL
BUN SERPL-MCNC: 24 MG/DL
CALCIUM SERPL-MCNC: 9.2 MG/DL
CHLORIDE SERPL-SCNC: 103 MMOL/L
CO2 SERPL-SCNC: 22 MMOL/L
CREAT SERPL-MCNC: 1.25 MG/DL
EGFR: 65 ML/MIN/1.73M2
EOSINOPHIL # BLD AUTO: 0.21 K/UL
EOSINOPHIL NFR BLD AUTO: 1.7 %
GLUCOSE SERPL-MCNC: 93 MG/DL
HCT VFR BLD CALC: 47.3 %
HGB BLD-MCNC: 14.9 G/DL
IMM GRANULOCYTES NFR BLD AUTO: 0.4 %
LYMPHOCYTES # BLD AUTO: 2.81 K/UL
LYMPHOCYTES NFR BLD AUTO: 22.6 %
MAN DIFF?: NORMAL
MCHC RBC-ENTMCNC: 28.2 PG
MCHC RBC-ENTMCNC: 31.5 GM/DL
MCV RBC AUTO: 89.6 FL
MONOCYTES # BLD AUTO: 1.13 K/UL
MONOCYTES NFR BLD AUTO: 9.1 %
NEUTROPHILS # BLD AUTO: 8.16 K/UL
NEUTROPHILS NFR BLD AUTO: 65.4 %
NT-PROBNP SERPL-MCNC: 807 PG/ML
PLATELET # BLD AUTO: 260 K/UL
POTASSIUM SERPL-SCNC: 4.1 MMOL/L
PREALB SERPL NEPH-MCNC: 4 MG/DL
PROT SERPL-MCNC: 6.4 G/DL
RBC # BLD: 5.28 M/UL
RBC # FLD: 14.8 %
SODIUM SERPL-SCNC: 138 MMOL/L
WBC # FLD AUTO: 12.46 K/UL

## 2023-11-20 ENCOUNTER — APPOINTMENT (OUTPATIENT)
Dept: INTERNAL MEDICINE | Facility: CLINIC | Age: 63
End: 2023-11-20
Payer: COMMERCIAL

## 2023-11-20 VITALS
SYSTOLIC BLOOD PRESSURE: 106 MMHG | WEIGHT: 181 LBS | OXYGEN SATURATION: 98 % | HEIGHT: 68 IN | BODY MASS INDEX: 27.43 KG/M2 | TEMPERATURE: 97.8 F | DIASTOLIC BLOOD PRESSURE: 65 MMHG | HEART RATE: 96 BPM

## 2023-11-20 DIAGNOSIS — Z12.11 ENCOUNTER FOR SCREENING FOR MALIGNANT NEOPLASM OF COLON: ICD-10-CM

## 2023-11-20 DIAGNOSIS — Z00.00 ENCOUNTER FOR GENERAL ADULT MEDICAL EXAMINATION W/OUT ABNORMAL FINDINGS: ICD-10-CM

## 2023-11-20 DIAGNOSIS — Z13.31 ENCOUNTER FOR SCREENING FOR DEPRESSION: ICD-10-CM

## 2023-11-20 DIAGNOSIS — R06.09 OTHER FORMS OF DYSPNEA: ICD-10-CM

## 2023-11-20 PROCEDURE — G0444 DEPRESSION SCREEN ANNUAL: CPT | Mod: 59

## 2023-11-20 PROCEDURE — 99396 PREV VISIT EST AGE 40-64: CPT | Mod: 25

## 2023-11-20 RX ORDER — TAFAMIDIS 61 MG/1
61 CAPSULE, LIQUID FILLED ORAL
Qty: 90 | Refills: 1 | Status: ACTIVE | COMMUNITY
Start: 2023-08-09 | End: 1900-01-01

## 2023-12-11 ENCOUNTER — APPOINTMENT (OUTPATIENT)
Dept: CARDIOLOGY | Facility: CLINIC | Age: 63
End: 2023-12-11

## 2023-12-11 DIAGNOSIS — R94.31 ABNORMAL ELECTROCARDIOGRAM [ECG] [EKG]: ICD-10-CM

## 2023-12-11 DIAGNOSIS — I48.92 UNSPECIFIED ATRIAL FLUTTER: ICD-10-CM

## 2023-12-13 ENCOUNTER — APPOINTMENT (OUTPATIENT)
Dept: CARDIOLOGY | Facility: CLINIC | Age: 63
End: 2023-12-13

## 2023-12-13 NOTE — PHYSICAL EXAM
[Well Nourished] : well nourished [No Acute Distress] : no acute distress [Normal Conjunctiva] : normal conjunctiva [Normal Venous Pressure] : normal venous pressure [No Carotid Bruit] : no carotid bruit [Normal S1, S2] : normal S1, S2 [No Murmur] : no murmur [No Rub] : no rub [No Gallop] : no gallop [Clear Lung Fields] : clear lung fields [Good Air Entry] : good air entry [No Respiratory Distress] : no respiratory distress  [Soft] : abdomen soft [Non Tender] : non-tender [No Masses/organomegaly] : no masses/organomegaly [Normal Bowel Sounds] : normal bowel sounds [Normal Gait] : normal gait [No Edema] : no edema [No Cyanosis] : no cyanosis [No Clubbing] : no clubbing [No Varicosities] : no varicosities [No Rash] : no rash [No Skin Lesions] : no skin lesions [Moves all extremities] : moves all extremities [No Focal Deficits] : no focal deficits [Normal Speech] : normal speech [Alert and Oriented] : alert and oriented [Normal memory] : normal memory [de-identified] : Macroglossia, periorbital ecchymoses [de-identified] : No periorbital ecchymosis

## 2023-12-13 NOTE — HISTORY OF PRESENT ILLNESS
[FreeTextEntry1] : WILFRIDO IRVIN 62-year M HTN HLD. TTR Amyloid variant, AF, Stroke. BMI 28 127/82 /min EKG atrial enlargement, low voltage, transaminitis (normalized off testosterone supplement).  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  3/23 Amyloid TTR nuc (pyroPO4) scan positive 7/23 stroke embolectomy.  Residual gait unsteadiness, intention tremor and dysdiadochokinesis, no hemiparesis but dysphasic speech 6/23 NSR 85/min LAE low voltage, Q 3, AVF (unchanged).  7/23 Holter AF throughout 1/23 Echo, LVEF 43% CLVH moderate, LAE, MR moderate, DD3   7/23 Echo LVEF 36% (BNP normal throughout); marked PATRICIA no PFO,  By report, CT Chest PAPVR YOGESH;Carotids (Angio) [facility] Meds apixaban 5 BID, dapagliflozin 10, atorvastatin 40mg (headache) vyndamax (tafamidis) 61mg; now amvuttra 25 mg Q3m (vutrisuran)

## 2023-12-20 ENCOUNTER — APPOINTMENT (OUTPATIENT)
Dept: CARDIOLOGY | Facility: CLINIC | Age: 63
End: 2023-12-20
Payer: COMMERCIAL

## 2023-12-20 ENCOUNTER — NON-APPOINTMENT (OUTPATIENT)
Age: 63
End: 2023-12-20

## 2023-12-20 VITALS
WEIGHT: 168 LBS | SYSTOLIC BLOOD PRESSURE: 106 MMHG | DIASTOLIC BLOOD PRESSURE: 73 MMHG | OXYGEN SATURATION: 99 % | HEART RATE: 80 BPM | BODY MASS INDEX: 25.54 KG/M2

## 2023-12-20 DIAGNOSIS — I47.10 SUPRAVENTRICULAR TACHYCARDIA, UNSPECIFIED: ICD-10-CM

## 2023-12-20 DIAGNOSIS — Z92.89 PERSONAL HISTORY OF OTHER MEDICAL TREATMENT: ICD-10-CM

## 2023-12-20 PROCEDURE — 99215 OFFICE O/P EST HI 40 MIN: CPT | Mod: 25

## 2023-12-20 PROCEDURE — 93000 ELECTROCARDIOGRAM COMPLETE: CPT

## 2023-12-20 RX ORDER — TORSEMIDE 20 MG/1
20 TABLET ORAL TWICE DAILY
Qty: 24 | Refills: 0 | Status: DISCONTINUED | COMMUNITY
Start: 2023-12-04 | End: 2023-12-20

## 2023-12-20 RX ORDER — ATORVASTATIN CALCIUM 80 MG/1
80 TABLET, FILM COATED ORAL
Qty: 90 | Refills: 2 | Status: ACTIVE | COMMUNITY
Start: 2023-12-20

## 2023-12-21 LAB
ALBUMIN SERPL ELPH-MCNC: 4.1 G/DL
ALP BLD-CCNC: 177 U/L
ALT SERPL-CCNC: 69 U/L
ANION GAP SERPL CALC-SCNC: 14 MMOL/L
AST SERPL-CCNC: 56 U/L
BASOPHILS # BLD AUTO: 0.1 K/UL
BASOPHILS NFR BLD AUTO: 0.9 %
BILIRUB SERPL-MCNC: 1.2 MG/DL
BUN SERPL-MCNC: 31 MG/DL
CALCIUM SERPL-MCNC: 9.8 MG/DL
CHLORIDE SERPL-SCNC: 101 MMOL/L
CO2 SERPL-SCNC: 23 MMOL/L
CREAT SERPL-MCNC: 1.44 MG/DL
EGFR: 55 ML/MIN/1.73M2
EOSINOPHIL # BLD AUTO: 0.29 K/UL
EOSINOPHIL NFR BLD AUTO: 2.5 %
GLUCOSE SERPL-MCNC: 82 MG/DL
HCT VFR BLD CALC: 48.6 %
HGB BLD-MCNC: 15.3 G/DL
IMM GRANULOCYTES NFR BLD AUTO: 0.4 %
LYMPHOCYTES # BLD AUTO: 1.99 K/UL
LYMPHOCYTES NFR BLD AUTO: 17.5 %
MAN DIFF?: NORMAL
MCHC RBC-ENTMCNC: 28.5 PG
MCHC RBC-ENTMCNC: 31.5 GM/DL
MCV RBC AUTO: 90.7 FL
MONOCYTES # BLD AUTO: 0.9 K/UL
MONOCYTES NFR BLD AUTO: 7.9 %
NEUTROPHILS # BLD AUTO: 8.07 K/UL
NEUTROPHILS NFR BLD AUTO: 70.8 %
NT-PROBNP SERPL-MCNC: 404 PG/ML
PLATELET # BLD AUTO: 319 K/UL
POTASSIUM SERPL-SCNC: 4.6 MMOL/L
PREALB SERPL NEPH-MCNC: 4 MG/DL
PROT SERPL-MCNC: 7.7 G/DL
RBC # BLD: 5.36 M/UL
RBC # FLD: 17 %
SODIUM SERPL-SCNC: 138 MMOL/L
WBC # FLD AUTO: 11.39 K/UL

## 2023-12-22 NOTE — REASON FOR VISIT
[Other: ____] : [unfilled] [Other: _____] : [unfilled] [FreeTextEntry1] : 12/20/2023 He was hospitalized at [facility] for edema and shortness of breath. He was noted to be in atrial fibrillation, and he was cardioverted. He was in ICU and had hemodynamic monitoring for acute decompensated heart failure and shock. Per his report, LVEF was found to be around 25%. (prior LVEF 43%). He also had elevated liver enzymes. Yesterday he saw a hepatologist who told him that he may need a liver transplant in addition to a heart transplant. Today he is feeling back to his usual self. His breathing has normalized and his edema has resolved. Home weights are stable around 168-170 pounds.
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
## 2023-12-22 NOTE — CARDIOLOGY SUMMARY
[de-identified] : 3/15/2023. Normal sinus rhythm at 77 BPM. Low voltage in the limb leads. Nonspecific ST and T abnormality.  4/12/2023. NSR at 71 BPM. LAE. Poor R-wave progression. Low voltage in the limb leads. Unchanged from prior tracing.  7/28/2023.  Normal sinus rhythm at 75 bpm.  Left atrial rhythm.  Low voltage in limb leads. 8/24/2023. AF. Low voltage in the limb leads. 7/28/2023.  Supraventricular tachycardia at 129 bpm.  Unclear atrial activity.  Compared with prior ECG the SVT is new. [de-identified] : 1/18/2023. TTE. LVEF 43%.\par  1. The entire septum is abnormal.\par  2. Left ventricular ejection fraction is mildly reduced with an LVEF of 43% by Harrington's biplant method. \par  3. There is moderate concentric left ventricular hypertrophy.\par  4. Severe (Grade 3) left ventricular diastolic dysfunction.\par  5. Moderate mitral valve regurgitation.\par  6. No significant valvular disease.\par  7. Mild thickening of the aortic valve leaflets.\par  8. No pericardial effusion seen. [de-identified] : 2/21/2023. Cardiac MRI.\par  1. Suspect cardiac amyloidosis; qualitative reduced LV/RV systolic function and global hypokineses with focal hypertrophy (next wall thickness 17 mm). \par  2. Abnormal parametric values of T1, T2 and LGE in a classic pattern for amyloidosis. \par  3. The cardiac valves and pericardium are unremarkable.\par  4. The extra cardiac is unremarkable. [de-identified] : 3/22/2023. PYP scan. Cardiac imaging study is strongly suggestive of transthyretin cardiac amyloidosis.

## 2023-12-22 NOTE — DISCUSSION/SUMMARY
[EKG obtained to assist in diagnosis and management of assessed problem(s)] : EKG obtained to assist in diagnosis and management of assessed problem(s) [FreeTextEntry1] : He is a 63 year old [place] man with newly diagnosed hATTR cardiac amyloidosis and recent ischemic stroke who presents today for follow up.   Recent cardiac testing as above.  Will start stabilizer therapy with Vyndamax 61 mg daily. Therapy has not been started due to ongoing insurance issues.  For a patient with pathogenic genetic variant will plan to begin silencer therapy with Amvuttra as well.  Next Amvuttra in February.   For patient with HFrEF, given his weight gain, orthopnea, exertional dyspnea, he will continue Jardiance 25 mg daily.  Continue spironolactone 25 mg daily and furosemide 80 mg daily.   He has been encouraged to keep a daily weight log and will notify the office for any weight gain > 3 pounds.  ECG today shows atrial fibrillation. Prior cardiac monitor showed atrial fibrillation 80% of the time with brief bursts of SVT. Will plan to discuss with Davidson Morgan MD for consideration of possible ablation.  Continue rosuvastatin 20 mg daily.  He will return in one month with an echocardiogram to monitor LVEF.

## 2024-01-01 NOTE — DIETITIAN INITIAL EVALUATION ADULT - ORAL INTAKE PTA/DIET HISTORY
Pt not very forthcoming about diet hx. Reports eating 3 meals/day, diet recall included rice, steak, chicken.  2024

## 2024-01-15 ENCOUNTER — APPOINTMENT (OUTPATIENT)
Dept: CARDIOLOGY | Facility: CLINIC | Age: 64
End: 2024-01-15

## 2024-02-06 NOTE — PATIENT PROFILE ADULT - NSPROHMSYMPCOND_GEN_A_NUR
[FreeTextEntry1] :  I spent a total of  45  minutes with this patient encounter . Greater than 50% of the time spent was     devoted to counseling and coordinating care including review of records, pertinent labs     data and studies, as well as discussing diagnosis evaluation and workup, planned     therapeutic interventions and future disposition of care. This includes any additional     research needed to obtain further information in formulating the plan of care of     this patient. This includes counseling the patient about their disease and diagnosis.   
none

## 2024-02-07 DIAGNOSIS — J06.9 ACUTE UPPER RESPIRATORY INFECTION, UNSPECIFIED: ICD-10-CM

## 2024-02-07 RX ORDER — AZITHROMYCIN 250 MG/1
250 TABLET, FILM COATED ORAL
Qty: 1 | Refills: 0 | Status: ACTIVE | COMMUNITY
Start: 2024-02-07 | End: 1900-01-01

## 2024-02-16 ENCOUNTER — APPOINTMENT (OUTPATIENT)
Dept: NEUROLOGY | Facility: CLINIC | Age: 64
End: 2024-02-16
Payer: MEDICAID

## 2024-02-16 VITALS
BODY MASS INDEX: 26.22 KG/M2 | HEIGHT: 68 IN | SYSTOLIC BLOOD PRESSURE: 104 MMHG | HEART RATE: 85 BPM | WEIGHT: 173 LBS | DIASTOLIC BLOOD PRESSURE: 72 MMHG

## 2024-02-16 DIAGNOSIS — R47.01 APHASIA: ICD-10-CM

## 2024-02-16 PROCEDURE — 99213 OFFICE O/P EST LOW 20 MIN: CPT

## 2024-02-16 RX ORDER — CHLORHEXIDINE GLUCONATE 4 %
400 (240 MG) LIQUID (ML) TOPICAL
Qty: 180 | Refills: 1 | Status: ACTIVE | COMMUNITY
Start: 2024-02-16 | End: 1900-01-01

## 2024-02-16 NOTE — ASSESSMENT
[FreeTextEntry1] : 63 year old man with HLD, amyloidosis, afib on eliquis, recent R MCA stroke, with residual amnesia, and dysfluency, as well as headaches. On eaxm, trace naming and fluency difficulty. Imaging showing the R MCA stroke, and bilateral FMD changes. Cardioembolic stroke. MRS 1. NIHSS 1. Post stroke headaches. Amensia.  -restart speech therapy -sent magnesium 400mg twice daily for headaches. -continue eliquis 5mg BID -continue lipitor 40mg -RTC 9-12 months.

## 2024-02-16 NOTE — HISTORY OF PRESENT ILLNESS
[FreeTextEntry1] : 63 year old man with hx of amyloidosis, R MCA stroke, afib on eliquis, HLD, returning to stroke neurology for hospital follow up.  Patient returning today to report improvement in his speech fluency.  He was following with speech therapy, but also making an effort to read and write, and engage in conversation, and concentrate on listening to music.  Last visit, he reported frequent headaches, and was started on magnesium, but reports he did not start it.  He continues to have occasional mild headaches, seem to start in the R neck area, at times, expanding to affect the occipital scalp, and the vertex.    STill independent in his ADL's.  Has been experiencing stable tingling in the hands and distal arms, has been ongoing for years with his amyloidosis.  Has not been on any gabpentin and not interested in starting it today either.   Medically, he did have a hospital visit for decompensated heart failure last month, acute on chronic CHF, with swelling in the lower extremities and trouble breathing.  He says he was cardioverted, and had IV medications for diuresis.  Otherwise, no other medical complaints, including fevers, chills, cough, abdominal pain, or diarrhea, and dysuria.    On exam: GEN; NAD CV: RRR, S1, S2 PULM: CTAB NEURO: Awake, alert, oriented, following commands, normal naming, trace paraphasic errors, reading intact, fluency mildly impaired, named 17 animals in 1 minute, oriented to hospital, month, date, year. Following commands. Pupils 4mm, minimally reactive, full VF's, normal EOM, conjugate gaze, no facial weakness, no dysarthria, tongue midline, palate symmetric. MOTOR: normal bulk and tone. no drift. Trace diminished L , biceps, otherwise 5/5 throughout SENSORY: intact symmetrically to pinprick COORDINATION: normal FNF REFLEXES: 1+ R BB, BR, triceps, 2+_L BB, BR, triceps, 1+ patellar, achilles. GAIT: narrow based. Negative Rhomberg.  MRI brain images reviewed: diffusion restriction, R temporal, parietal, R frontal, and ZION/MCA watershed white matter. CTA H&N images reviewed: R MCA occlusion. Bilateral FMD like changes in the mid to distal ICA.

## 2024-03-12 NOTE — PATIENT PROFILE ADULT - TRANSPORTATION
3}    Medication requested (name and dose):      levETIRAcetam (KEPPRA) 750 MG tablet              Summary: Take 2 tablets by mouth Every 12 (Twelve) Hours          Pharmacy where request should be sent:      [facility] [place], [place] - [address] [place] [address] AVE - [phone]  - [phone] FX     Additional details provided by patient:      Best call back number:  [phone]    Does the patient have less than a 3 day supply:  [x] Yes  [] No    Brain Ivy Rep  03/12/24, 16:11 EDT    no

## 2024-03-16 NOTE — PHYSICAL THERAPY INITIAL EVALUATION ADULT - ADL SKILLS, REHAB EVAL
independent
PRINCIPAL DISCHARGE DIAGNOSIS  Diagnosis: Alcohol dependence with withdrawal  Assessment and Plan of Treatment: You were admitted to the hospital after you were experiencing alcohol withdrawal. For this, you were given medications to help with your withdrawal symptoms. Moving forward, we recommend limiting your intake of alcohol and follow up with your primary doctor for medications that can help with this. Return to the hospital if your symptoms worsen.      SECONDARY DISCHARGE DIAGNOSES  Diagnosis: Hematemesis  Assessment and Plan of Treatment: You also had an episode of vomitting prior to coming into the hosptial that was concerning for blood loss. For this, you were seen by the GI doctors and they recommended starting a new medication (pantoprazole). It is possible that your alcohol consumption has irritated your stomch and GI tract, which contributed to your vomitting. We recommend following with the GI doctors in clinic for further management.

## 2024-03-25 ENCOUNTER — RX RENEWAL (OUTPATIENT)
Age: 64
End: 2024-03-25

## 2024-05-06 ENCOUNTER — RX RENEWAL (OUTPATIENT)
Age: 64
End: 2024-05-06

## 2024-05-10 RX ORDER — VUTRISIRAN 25 MG/.5ML
25 INJECTION SUBCUTANEOUS
Qty: 1 | Refills: 3 | Status: ACTIVE | COMMUNITY
Start: 2024-05-10 | End: 1900-01-01

## 2024-05-10 NOTE — DIETITIAN INITIAL EVALUATION ADULT - FEEDING SKILL
[Dear  ___] : Dear  [unfilled], [Consult Letter:] : I had the pleasure of evaluating your patient, [unfilled]. [Please see my note below.] : Please see my note below. [Consult Closing:] : Thank you very much for allowing me to participate in the care of this patient.  If you have any questions, please do not hesitate to contact me. [Sincerely,] : Sincerely, independent [FreeTextEntry3] : Minnie Coronado, \par  Genitourinary Medicine\par  [facility]\par

## 2024-05-23 ENCOUNTER — APPOINTMENT (OUTPATIENT)
Dept: INTERNAL MEDICINE | Facility: CLINIC | Age: 64
End: 2024-05-23
Payer: MEDICAID

## 2024-05-23 VITALS
BODY MASS INDEX: 24.86 KG/M2 | OXYGEN SATURATION: 95 % | TEMPERATURE: 97.6 F | WEIGHT: 164 LBS | HEIGHT: 68 IN | SYSTOLIC BLOOD PRESSURE: 114 MMHG | HEART RATE: 92 BPM | DIASTOLIC BLOOD PRESSURE: 77 MMHG

## 2024-05-23 DIAGNOSIS — E85.89 OTHER AMYLOIDOSIS: ICD-10-CM

## 2024-05-23 DIAGNOSIS — I50.20 UNSPECIFIED SYSTOLIC (CONGESTIVE) HEART FAILURE: ICD-10-CM

## 2024-05-23 DIAGNOSIS — I63.9 CEREBRAL INFARCTION, UNSPECIFIED: ICD-10-CM

## 2024-05-23 PROCEDURE — 99214 OFFICE O/P EST MOD 30 MIN: CPT

## 2024-05-23 PROCEDURE — G2211 COMPLEX E/M VISIT ADD ON: CPT | Mod: NC,1L

## 2024-05-23 RX ORDER — SPIRONOLACTONE 25 MG/1
25 TABLET ORAL DAILY
Qty: 90 | Refills: 3 | Status: ACTIVE | COMMUNITY
Start: 2023-11-09 | End: 1900-01-01

## 2024-05-23 RX ORDER — TORSEMIDE 20 MG/1
20 TABLET ORAL DAILY
Qty: 180 | Refills: 1 | Status: ACTIVE | COMMUNITY
Start: 2024-05-23 | End: 1900-01-01

## 2024-05-23 RX ORDER — FUROSEMIDE 40 MG/1
40 TABLET ORAL
Qty: 180 | Refills: 3 | Status: DISCONTINUED | COMMUNITY
Start: 2023-11-09 | End: 2024-05-23

## 2024-05-23 RX ORDER — METOLAZONE 5 MG/1
5 TABLET ORAL DAILY
Qty: 3 | Refills: 0 | Status: DISCONTINUED | COMMUNITY
Start: 2023-12-04 | End: 2024-05-23

## 2024-05-23 RX ORDER — APIXABAN 5 MG/1
5 TABLET, FILM COATED ORAL
Qty: 180 | Refills: 3 | Status: ACTIVE | COMMUNITY
Start: 2023-07-28 | End: 1900-01-01

## 2024-05-23 RX ORDER — ROSUVASTATIN CALCIUM 20 MG/1
20 TABLET, FILM COATED ORAL
Qty: 90 | Refills: 1 | Status: ACTIVE | COMMUNITY
Start: 2023-11-07 | End: 1900-01-01

## 2024-05-23 RX ORDER — SILDENAFIL 100 MG/1
100 TABLET, FILM COATED ORAL
Qty: 20 | Refills: 0 | Status: ACTIVE | COMMUNITY
Start: 2021-01-25 | End: 1900-01-01

## 2024-05-23 RX ORDER — EMPAGLIFLOZIN 10 MG/1
10 TABLET, FILM COATED ORAL
Qty: 90 | Refills: 0 | Status: ACTIVE | COMMUNITY
Start: 2023-07-28 | End: 1900-01-01

## 2024-05-23 NOTE — ASSESSMENT
[FreeTextEntry1] : follow up  patient with amyloid heart disease CHF  started vyndamax for better control of disease is taking Amvuttra injections as well  CHF on lasix and Jardiance swelling improved slightly  ED- never got the medication will resend   Blood work drawn in office today

## 2024-05-23 NOTE — HEALTH RISK ASSESSMENT
[No] : In the past 12 months have you used drugs other than those required for medical reasons? No [No falls in past year] : Patient reported no falls in the past year [0] : 2) Feeling down, depressed, or hopeless: Not at all (0) [Never] : Never [Audit-CScore] : 0 [de-identified] : biking, workout [de-identified] : healthy [[id number]] : 0

## 2024-05-23 NOTE — HISTORY OF PRESENT ILLNESS
[FreeTextEntry1] : follow up [de-identified] : follow up  patient with amyloid heart disease CHF  started vyndamax for better control of disease is taking Amvuttra injections as well  CHF on lasix and Jardiance swelling improved slightly  ED- never got the medication will resend

## 2024-05-28 LAB
25(OH)D3 SERPL-MCNC: 33.7 NG/ML
ALBUMIN SERPL ELPH-MCNC: 4.5 G/DL
ALP BLD-CCNC: 154 U/L
ALT SERPL-CCNC: 25 U/L
ANION GAP SERPL CALC-SCNC: 16 MMOL/L
APPEARANCE: CLEAR
AST SERPL-CCNC: 35 U/L
BASOPHILS # BLD AUTO: 0.07 K/UL
BASOPHILS NFR BLD AUTO: 0.8 %
BILIRUB SERPL-MCNC: 1.1 MG/DL
BILIRUBIN URINE: NEGATIVE
BLOOD URINE: NEGATIVE
BUN SERPL-MCNC: 25 MG/DL
CALCIUM SERPL-MCNC: 10.2 MG/DL
CHLORIDE SERPL-SCNC: 99 MMOL/L
CHOLEST SERPL-MCNC: 145 MG/DL
CO2 SERPL-SCNC: 22 MMOL/L
COLOR: YELLOW
CREAT SERPL-MCNC: 1.36 MG/DL
EGFR: 58 ML/MIN/1.73M2
EOSINOPHIL # BLD AUTO: 0.14 K/UL
EOSINOPHIL NFR BLD AUTO: 1.7 %
ESTIMATED AVERAGE GLUCOSE: 85 MG/DL
FERRITIN SERPL-MCNC: 98 NG/ML
FOLATE SERPL-MCNC: 16.5 NG/ML
GLUCOSE QUALITATIVE U: >=1000 MG/DL
GLUCOSE SERPL-MCNC: 99 MG/DL
HBA1C MFR BLD HPLC: 4.6 %
HCT VFR BLD CALC: 46.9 %
HDLC SERPL-MCNC: 82 MG/DL
HGB BLD-MCNC: 14.9 G/DL
IMM GRANULOCYTES NFR BLD AUTO: 0.2 %
IRON SATN MFR SERPL: 25 %
IRON SERPL-MCNC: 79 UG/DL
KETONES URINE: NEGATIVE MG/DL
LDLC SERPL CALC-MCNC: 49 MG/DL
LEUKOCYTE ESTERASE URINE: NEGATIVE
LYMPHOCYTES # BLD AUTO: 1.96 K/UL
LYMPHOCYTES NFR BLD AUTO: 23.1 %
MAN DIFF?: NORMAL
MCHC RBC-ENTMCNC: 29.9 PG
MCHC RBC-ENTMCNC: 31.8 GM/DL
MCV RBC AUTO: 94 FL
MONOCYTES # BLD AUTO: 0.65 K/UL
MONOCYTES NFR BLD AUTO: 7.7 %
NEUTROPHILS # BLD AUTO: 5.64 K/UL
NEUTROPHILS NFR BLD AUTO: 66.5 %
NITRITE URINE: NEGATIVE
NONHDLC SERPL-MCNC: 64 MG/DL
NT-PROBNP SERPL-MCNC: 117 PG/ML
PH URINE: 6
PLATELET # BLD AUTO: 265 K/UL
POTASSIUM SERPL-SCNC: 3.7 MMOL/L
PROT SERPL-MCNC: 7.7 G/DL
PROTEIN URINE: NEGATIVE MG/DL
RBC # BLD: 4.99 M/UL
RBC # FLD: 12.4 %
SODIUM SERPL-SCNC: 137 MMOL/L
SPECIFIC GRAVITY URINE: 1.01
TIBC SERPL-MCNC: 323 UG/DL
TRIGL SERPL-MCNC: 74 MG/DL
TSH SERPL-ACNC: 2.01 UIU/ML
UIBC SERPL-MCNC: 244 UG/DL
UROBILINOGEN URINE: 0.2 MG/DL
VIT B12 SERPL-MCNC: 707 PG/ML
WBC # FLD AUTO: 8.48 K/UL

## 2024-07-11 ENCOUNTER — APPOINTMENT (OUTPATIENT)
Dept: CARDIOLOGY | Facility: CLINIC | Age: 64
End: 2024-07-11
Payer: MEDICAID

## 2024-07-11 ENCOUNTER — NON-APPOINTMENT (OUTPATIENT)
Age: 64
End: 2024-07-11

## 2024-07-11 VITALS
OXYGEN SATURATION: 97 % | WEIGHT: 162 LBS | HEIGHT: 68 IN | RESPIRATION RATE: 17 BRPM | SYSTOLIC BLOOD PRESSURE: 113 MMHG | BODY MASS INDEX: 24.55 KG/M2 | HEART RATE: 70 BPM | DIASTOLIC BLOOD PRESSURE: 73 MMHG

## 2024-07-11 DIAGNOSIS — E85.89 OTHER AMYLOIDOSIS: ICD-10-CM

## 2024-07-11 DIAGNOSIS — I63.9 CEREBRAL INFARCTION, UNSPECIFIED: ICD-10-CM

## 2024-07-11 DIAGNOSIS — Z15.89 GENETIC SUSCEPTIBILITY TO OTHER DISEASE: ICD-10-CM

## 2024-07-11 LAB
ALBUMIN SERPL ELPH-MCNC: 4.7 G/DL
ALP BLD-CCNC: 176 U/L
ALT SERPL-CCNC: 20 U/L
ANION GAP SERPL CALC-SCNC: 17 MMOL/L
AST SERPL-CCNC: 29 U/L
BASOPHILS # BLD AUTO: 0.07 K/UL
BASOPHILS NFR BLD AUTO: 0.8 %
BILIRUB SERPL-MCNC: 1 MG/DL
BUN SERPL-MCNC: 28 MG/DL
CALCIUM SERPL-MCNC: 10.3 MG/DL
CHLORIDE SERPL-SCNC: 99 MMOL/L
CO2 SERPL-SCNC: 24 MMOL/L
CREAT SERPL-MCNC: 1.3 MG/DL
EGFR: 62 ML/MIN/1.73M2
EOSINOPHIL # BLD AUTO: 0.14 K/UL
EOSINOPHIL NFR BLD AUTO: 1.5 %
GLUCOSE SERPL-MCNC: 94 MG/DL
HCT VFR BLD CALC: 46.3 %
IMM GRANULOCYTES NFR BLD AUTO: 0.3 %
LYMPHOCYTES # BLD AUTO: 1.53 K/UL
LYMPHOCYTES NFR BLD AUTO: 16.9 %
MCHC RBC-ENTMCNC: 30.2 PG
MCHC RBC-ENTMCNC: 33 GM/DL
MCV RBC AUTO: 91.3 FL
MONOCYTES # BLD AUTO: 0.69 K/UL
MONOCYTES NFR BLD AUTO: 7.6 %
NEUTROPHILS # BLD AUTO: 6.61 K/UL
NEUTROPHILS NFR BLD AUTO: 72.9 %
NT-PROBNP SERPL-MCNC: 71 PG/ML
PLATELET # BLD AUTO: 304 K/UL
POTASSIUM SERPL-SCNC: 4.4 MMOL/L
PROT SERPL-MCNC: 8.4 G/DL
RBC # BLD: 5.07 M/UL
RBC # FLD: 13 %
SODIUM SERPL-SCNC: 140 MMOL/L
WBC # FLD AUTO: 9.07 K/UL

## 2024-07-11 PROCEDURE — 93000 ELECTROCARDIOGRAM COMPLETE: CPT

## 2024-07-11 PROCEDURE — G2211 COMPLEX E/M VISIT ADD ON: CPT | Mod: NC,1L

## 2024-07-11 PROCEDURE — 99215 OFFICE O/P EST HI 40 MIN: CPT | Mod: 25

## 2024-08-20 ENCOUNTER — APPOINTMENT (OUTPATIENT)
Dept: INTERNAL MEDICINE | Facility: CLINIC | Age: 64
End: 2024-08-20
Payer: MEDICAID

## 2024-08-20 VITALS
DIASTOLIC BLOOD PRESSURE: 67 MMHG | OXYGEN SATURATION: 98 % | SYSTOLIC BLOOD PRESSURE: 111 MMHG | WEIGHT: 164 LBS | HEIGHT: 68 IN | BODY MASS INDEX: 24.86 KG/M2 | TEMPERATURE: 98.2 F | HEART RATE: 64 BPM

## 2024-08-20 DIAGNOSIS — M16.12 UNILATERAL PRIMARY OSTEOARTHRITIS, LEFT HIP: ICD-10-CM

## 2024-08-20 DIAGNOSIS — I50.20 UNSPECIFIED SYSTOLIC (CONGESTIVE) HEART FAILURE: ICD-10-CM

## 2024-08-20 DIAGNOSIS — I48.92 UNSPECIFIED ATRIAL FLUTTER: ICD-10-CM

## 2024-08-20 DIAGNOSIS — R26.9 UNSPECIFIED ABNORMALITIES OF GAIT AND MOBILITY: ICD-10-CM

## 2024-08-20 DIAGNOSIS — M25.511 PAIN IN RIGHT SHOULDER: ICD-10-CM

## 2024-08-20 DIAGNOSIS — E85.89 OTHER AMYLOIDOSIS: ICD-10-CM

## 2024-08-20 DIAGNOSIS — N52.9 MALE ERECTILE DYSFUNCTION, UNSPECIFIED: ICD-10-CM

## 2024-08-20 PROCEDURE — 99214 OFFICE O/P EST MOD 30 MIN: CPT

## 2024-08-20 PROCEDURE — G2211 COMPLEX E/M VISIT ADD ON: CPT | Mod: NC,1L

## 2024-08-20 RX ORDER — TADALAFIL 20 MG/1
20 TABLET ORAL
Qty: 10 | Refills: 2 | Status: ACTIVE | COMMUNITY
Start: 2024-08-20 | End: 1900-01-01

## 2024-08-20 NOTE — ASSESSMENT
[FreeTextEntry1] : follow up  would like a PT referral for gait strengthening and shoulder exercises  patient with amyloid heart disease CHF  started vyndamax for better control of disease is taking Amvuttra injections as well  CHF on lasix and Jardiance swelling improved slightly  ED- would like to try cialis

## 2024-08-20 NOTE — HISTORY OF PRESENT ILLNESS
[FreeTextEntry1] : follow up [de-identified] : follow up  would like a PT referral for gait strengthening and shoulder exercises  patient with amyloid heart disease CHF  started vyndamax for better control of disease is taking Amvuttra injections as well  CHF on lasix and Jardiance swelling improved slightly  ED- would like to try cialis

## 2024-08-20 NOTE — HEALTH RISK ASSESSMENT
[Never (0 pts)] : Never (0 points) [No] : In the past 12 months have you used drugs other than those required for medical reasons? No [No falls in past year] : Patient reported no falls in the past year [0] : 2) Feeling down, depressed, or hopeless: Not at all (0) [de-identified] : no [de-identified] : Cardiologist  [Audit-CScore] : 0 [de-identified] : gym  [de-identified] : healthy [[id number]] : 0

## 2024-10-08 ENCOUNTER — APPOINTMENT (OUTPATIENT)
Dept: CARDIOLOGY | Facility: CLINIC | Age: 64
End: 2024-10-08
Payer: MEDICAID

## 2024-10-08 ENCOUNTER — NON-APPOINTMENT (OUTPATIENT)
Age: 64
End: 2024-10-08

## 2024-10-08 VITALS
SYSTOLIC BLOOD PRESSURE: 106 MMHG | DIASTOLIC BLOOD PRESSURE: 69 MMHG | WEIGHT: 164 LBS | HEART RATE: 77 BPM | BODY MASS INDEX: 24.94 KG/M2 | OXYGEN SATURATION: 96 %

## 2024-10-08 DIAGNOSIS — E85.89 OTHER AMYLOIDOSIS: ICD-10-CM

## 2024-10-08 DIAGNOSIS — I48.92 UNSPECIFIED ATRIAL FLUTTER: ICD-10-CM

## 2024-10-08 DIAGNOSIS — I63.9 CEREBRAL INFARCTION, UNSPECIFIED: ICD-10-CM

## 2024-10-08 DIAGNOSIS — I50.20 UNSPECIFIED SYSTOLIC (CONGESTIVE) HEART FAILURE: ICD-10-CM

## 2024-10-08 PROCEDURE — 99215 OFFICE O/P EST HI 40 MIN: CPT

## 2024-10-08 PROCEDURE — 93000 ELECTROCARDIOGRAM COMPLETE: CPT

## 2024-10-08 PROCEDURE — G2211 COMPLEX E/M VISIT ADD ON: CPT | Mod: NC

## 2024-10-09 ENCOUNTER — APPOINTMENT (OUTPATIENT)
Dept: INTERNAL MEDICINE | Facility: CLINIC | Age: 64
End: 2024-10-09

## 2024-10-09 LAB
ALBUMIN SERPL ELPH-MCNC: 4.1 G/DL
ALP BLD-CCNC: 154 U/L
ALT SERPL-CCNC: 24 U/L
ANION GAP SERPL CALC-SCNC: 16 MMOL/L
AST SERPL-CCNC: 34 U/L
BILIRUB SERPL-MCNC: 1.2 MG/DL
BUN SERPL-MCNC: 20 MG/DL
CALCIUM SERPL-MCNC: 9.9 MG/DL
CHLORIDE SERPL-SCNC: 101 MMOL/L
CO2 SERPL-SCNC: 20 MMOL/L
CREAT SERPL-MCNC: 1.14 MG/DL
EGFR: 72 ML/MIN/1.73M2
GLUCOSE SERPL-MCNC: 93 MG/DL
HCT VFR BLD CALC: 45.8 %
HGB BLD-MCNC: 14.6 G/DL
MCHC RBC-ENTMCNC: 30.2 PG
MCHC RBC-ENTMCNC: 31.9 GM/DL
MCV RBC AUTO: 94.6 FL
NT-PROBNP SERPL-MCNC: 51 PG/ML
PLATELET # BLD AUTO: 280 K/UL
POTASSIUM SERPL-SCNC: 4.7 MMOL/L
PREALB SERPL NEPH-MCNC: 4 MG/DL
PROT SERPL-MCNC: 7.6 G/DL
RBC # BLD: 4.84 M/UL
RBC # FLD: 12.5 %
SODIUM SERPL-SCNC: 136 MMOL/L
WBC # FLD AUTO: 7.18 K/UL

## 2024-10-10 ENCOUNTER — APPOINTMENT (OUTPATIENT)
Dept: CARDIOLOGY | Facility: CLINIC | Age: 64
End: 2024-10-10

## 2024-12-09 ENCOUNTER — APPOINTMENT (OUTPATIENT)
Dept: NEUROLOGY | Facility: CLINIC | Age: 64
End: 2024-12-09

## 2024-12-09 ENCOUNTER — APPOINTMENT (OUTPATIENT)
Dept: NEUROLOGY | Facility: CLINIC | Age: 64
End: 2024-12-09
Payer: MEDICAID

## 2024-12-09 VITALS
WEIGHT: 155 LBS | HEIGHT: 68 IN | DIASTOLIC BLOOD PRESSURE: 75 MMHG | BODY MASS INDEX: 23.49 KG/M2 | SYSTOLIC BLOOD PRESSURE: 118 MMHG | HEART RATE: 72 BPM

## 2024-12-09 DIAGNOSIS — E85.89 OTHER AMYLOIDOSIS: ICD-10-CM

## 2024-12-09 DIAGNOSIS — I63.9 CEREBRAL INFARCTION, UNSPECIFIED: ICD-10-CM

## 2024-12-09 PROCEDURE — 99214 OFFICE O/P EST MOD 30 MIN: CPT

## 2024-12-09 PROCEDURE — G2211 COMPLEX E/M VISIT ADD ON: CPT | Mod: NC

## 2025-01-14 ENCOUNTER — APPOINTMENT (OUTPATIENT)
Dept: CARDIOLOGY | Facility: CLINIC | Age: 65
End: 2025-01-14
Payer: MEDICAID

## 2025-01-14 PROCEDURE — 93356 MYOCRD STRAIN IMG SPCKL TRCK: CPT

## 2025-01-14 PROCEDURE — 93306 TTE W/DOPPLER COMPLETE: CPT

## 2025-01-22 ENCOUNTER — APPOINTMENT (OUTPATIENT)
Dept: NEUROLOGY | Facility: CLINIC | Age: 65
End: 2025-01-22

## 2025-01-24 ENCOUNTER — RX RENEWAL (OUTPATIENT)
Age: 65
End: 2025-01-24

## 2025-04-03 ENCOUNTER — APPOINTMENT (OUTPATIENT)
Dept: NEUROLOGY | Facility: CLINIC | Age: 65
End: 2025-04-03

## 2025-04-10 ENCOUNTER — APPOINTMENT (OUTPATIENT)
Dept: NEUROLOGY | Facility: CLINIC | Age: 65
End: 2025-04-10

## 2025-04-10 PROCEDURE — 96132 NRPSYC TST EVAL PHYS/QHP 1ST: CPT

## 2025-04-10 PROCEDURE — 96116 NUBHVL XM PHYS/QHP 1ST HR: CPT

## 2025-04-10 PROCEDURE — 96133 NRPSYC TST EVAL PHYS/QHP EA: CPT

## 2025-04-15 ENCOUNTER — APPOINTMENT (OUTPATIENT)
Dept: NEUROLOGY | Facility: CLINIC | Age: 65
End: 2025-04-15

## 2025-04-15 PROCEDURE — 96133 NRPSYC TST EVAL PHYS/QHP EA: CPT

## 2025-04-15 PROCEDURE — 96138 PSYCL/NRPSYC TECH 1ST: CPT

## 2025-04-15 PROCEDURE — 96139 PSYCL/NRPSYC TST TECH EA: CPT

## 2025-05-02 ENCOUNTER — APPOINTMENT (OUTPATIENT)
Dept: NEUROLOGY | Facility: CLINIC | Age: 65
End: 2025-05-02

## 2025-05-02 PROCEDURE — 96136 PSYCL/NRPSYC TST PHY/QHP 1ST: CPT

## 2025-05-02 PROCEDURE — 96133 NRPSYC TST EVAL PHYS/QHP EA: CPT

## 2025-05-12 ENCOUNTER — INPATIENT (INPATIENT)
Facility: HOSPITAL | Age: 65
LOS: 0 days | Discharge: HOME | DRG: 66 | End: 2025-05-13
Attending: PSYCHIATRY & NEUROLOGY | Admitting: PSYCHIATRY & NEUROLOGY
Payer: MEDICAID

## 2025-05-12 VITALS
HEIGHT: 69 IN | DIASTOLIC BLOOD PRESSURE: 62 MMHG | RESPIRATION RATE: 18 BRPM | SYSTOLIC BLOOD PRESSURE: 103 MMHG | HEART RATE: 74 BPM | WEIGHT: 190.04 LBS | OXYGEN SATURATION: 95 %

## 2025-05-12 DIAGNOSIS — Z98.890 OTHER SPECIFIED POSTPROCEDURAL STATES: Chronic | ICD-10-CM

## 2025-05-12 LAB
A1C WITH ESTIMATED AVERAGE GLUCOSE RESULT: 4.3 % — SIGNIFICANT CHANGE UP (ref 4–5.6)
ALBUMIN SERPL ELPH-MCNC: 3.6 G/DL — SIGNIFICANT CHANGE UP (ref 3.3–5)
ALP SERPL-CCNC: 125 U/L — HIGH (ref 40–120)
ALT FLD-CCNC: 21 U/L — SIGNIFICANT CHANGE UP (ref 10–45)
ANION GAP SERPL CALC-SCNC: 13 MMOL/L — SIGNIFICANT CHANGE UP (ref 5–17)
APTT BLD: 27.5 SEC — SIGNIFICANT CHANGE UP (ref 26.1–36.8)
AST SERPL-CCNC: 33 U/L — SIGNIFICANT CHANGE UP (ref 10–40)
BASOPHILS # BLD AUTO: 0.06 K/UL — SIGNIFICANT CHANGE UP (ref 0–0.2)
BASOPHILS NFR BLD AUTO: 0.7 % — SIGNIFICANT CHANGE UP (ref 0–2)
BILIRUB SERPL-MCNC: 1.3 MG/DL — HIGH (ref 0.2–1.2)
BUN SERPL-MCNC: 17 MG/DL — SIGNIFICANT CHANGE UP (ref 7–23)
CALCIUM SERPL-MCNC: 9.2 MG/DL — SIGNIFICANT CHANGE UP (ref 8.4–10.5)
CHLORIDE SERPL-SCNC: 103 MMOL/L — SIGNIFICANT CHANGE UP (ref 96–108)
CO2 SERPL-SCNC: 22 MMOL/L — SIGNIFICANT CHANGE UP (ref 22–31)
CREAT SERPL-MCNC: 1.08 MG/DL — SIGNIFICANT CHANGE UP (ref 0.5–1.3)
EGFR: 77 ML/MIN/1.73M2 — SIGNIFICANT CHANGE UP
EGFR: 77 ML/MIN/1.73M2 — SIGNIFICANT CHANGE UP
EOSINOPHIL # BLD AUTO: 0.03 K/UL — SIGNIFICANT CHANGE UP (ref 0–0.5)
EOSINOPHIL NFR BLD AUTO: 0.3 % — SIGNIFICANT CHANGE UP (ref 0–6)
ESTIMATED AVERAGE GLUCOSE: 77 MG/DL — SIGNIFICANT CHANGE UP (ref 68–114)
GLUCOSE SERPL-MCNC: 115 MG/DL — HIGH (ref 70–99)
HCT VFR BLD CALC: 45.3 % — SIGNIFICANT CHANGE UP (ref 39–50)
HGB BLD-MCNC: 15.1 G/DL — SIGNIFICANT CHANGE UP (ref 13–17)
IMM GRANULOCYTES NFR BLD AUTO: 0.3 % — SIGNIFICANT CHANGE UP (ref 0–0.9)
INR BLD: 1.03 RATIO — SIGNIFICANT CHANGE UP (ref 0.85–1.16)
LYMPHOCYTES # BLD AUTO: 1.14 K/UL — SIGNIFICANT CHANGE UP (ref 1–3.3)
LYMPHOCYTES # BLD AUTO: 12.4 % — LOW (ref 13–44)
MCHC RBC-ENTMCNC: 30.9 PG — SIGNIFICANT CHANGE UP (ref 27–34)
MCHC RBC-ENTMCNC: 33.3 G/DL — SIGNIFICANT CHANGE UP (ref 32–36)
MCV RBC AUTO: 92.8 FL — SIGNIFICANT CHANGE UP (ref 80–100)
MONOCYTES # BLD AUTO: 0.53 K/UL — SIGNIFICANT CHANGE UP (ref 0–0.9)
MONOCYTES NFR BLD AUTO: 5.8 % — SIGNIFICANT CHANGE UP (ref 2–14)
NEUTROPHILS # BLD AUTO: 7.37 K/UL — SIGNIFICANT CHANGE UP (ref 1.8–7.4)
NEUTROPHILS NFR BLD AUTO: 80.5 % — HIGH (ref 43–77)
NRBC BLD AUTO-RTO: 0 /100 WBCS — SIGNIFICANT CHANGE UP (ref 0–0)
PLATELET # BLD AUTO: 255 K/UL — SIGNIFICANT CHANGE UP (ref 150–400)
POTASSIUM SERPL-MCNC: 4.3 MMOL/L — SIGNIFICANT CHANGE UP (ref 3.5–5.3)
POTASSIUM SERPL-SCNC: 4.3 MMOL/L — SIGNIFICANT CHANGE UP (ref 3.5–5.3)
PROT SERPL-MCNC: 6.6 G/DL — SIGNIFICANT CHANGE UP (ref 6–8.3)
PROTHROM AB SERPL-ACNC: 11.8 SEC — SIGNIFICANT CHANGE UP (ref 9.9–13.4)
RBC # BLD: 4.88 M/UL — SIGNIFICANT CHANGE UP (ref 4.2–5.8)
RBC # FLD: 12.5 % — SIGNIFICANT CHANGE UP (ref 10.3–14.5)
SODIUM SERPL-SCNC: 138 MMOL/L — SIGNIFICANT CHANGE UP (ref 135–145)
TROPONIN T, HIGH SENSITIVITY RESULT: 20 NG/L — SIGNIFICANT CHANGE UP (ref 0–51)
WBC # BLD: 9.16 K/UL — SIGNIFICANT CHANGE UP (ref 3.8–10.5)
WBC # FLD AUTO: 9.16 K/UL — SIGNIFICANT CHANGE UP (ref 3.8–10.5)

## 2025-05-12 PROCEDURE — 93010 ELECTROCARDIOGRAM REPORT: CPT

## 2025-05-12 PROCEDURE — 70496 CT ANGIOGRAPHY HEAD: CPT | Mod: 26

## 2025-05-12 PROCEDURE — 99222 1ST HOSP IP/OBS MODERATE 55: CPT

## 2025-05-12 PROCEDURE — 99223 1ST HOSP IP/OBS HIGH 75: CPT

## 2025-05-12 PROCEDURE — 70450 CT HEAD/BRAIN W/O DYE: CPT | Mod: 26,59

## 2025-05-12 PROCEDURE — 99291 CRITICAL CARE FIRST HOUR: CPT

## 2025-05-12 PROCEDURE — 70498 CT ANGIOGRAPHY NECK: CPT | Mod: 26

## 2025-05-12 PROCEDURE — 70551 MRI BRAIN STEM W/O DYE: CPT | Mod: 26

## 2025-05-12 RX ORDER — DAPAGLIFLOZIN 5 MG/1
10 TABLET, FILM COATED ORAL DAILY
Refills: 0 | Status: DISCONTINUED | OUTPATIENT
Start: 2025-05-12 | End: 2025-05-13

## 2025-05-12 RX ORDER — ATORVASTATIN CALCIUM 80 MG/1
1 TABLET, FILM COATED ORAL
Refills: 0 | DISCHARGE

## 2025-05-12 RX ORDER — MELATONIN 5 MG
3 TABLET ORAL AT BEDTIME
Refills: 0 | Status: DISCONTINUED | OUTPATIENT
Start: 2025-05-12 | End: 2025-05-13

## 2025-05-12 RX ORDER — SPIRONOLACTONE 25 MG
25 TABLET ORAL DAILY
Refills: 0 | Status: DISCONTINUED | OUTPATIENT
Start: 2025-05-12 | End: 2025-05-13

## 2025-05-12 RX ORDER — POLYETHYLENE GLYCOL 3350 17 G/17G
17 POWDER, FOR SOLUTION ORAL DAILY
Refills: 0 | Status: DISCONTINUED | OUTPATIENT
Start: 2025-05-12 | End: 2025-05-13

## 2025-05-12 RX ORDER — APIXABAN 2.5 MG/1
5 TABLET, FILM COATED ORAL EVERY 12 HOURS
Refills: 0 | Status: DISCONTINUED | OUTPATIENT
Start: 2025-05-12 | End: 2025-05-13

## 2025-05-12 RX ORDER — APIXABAN 2.5 MG/1
5 TABLET, FILM COATED ORAL ONCE
Refills: 0 | Status: COMPLETED | OUTPATIENT
Start: 2025-05-12 | End: 2025-05-12

## 2025-05-12 RX ORDER — SODIUM CHLORIDE 9 G/1000ML
250 INJECTION, SOLUTION INTRAVENOUS ONCE
Refills: 0 | Status: COMPLETED | OUTPATIENT
Start: 2025-05-12 | End: 2025-05-12

## 2025-05-12 RX ORDER — ACETAMINOPHEN 500 MG/5ML
650 LIQUID (ML) ORAL EVERY 6 HOURS
Refills: 0 | Status: DISCONTINUED | OUTPATIENT
Start: 2025-05-12 | End: 2025-05-13

## 2025-05-12 RX ORDER — ATORVASTATIN CALCIUM 80 MG/1
10 TABLET, FILM COATED ORAL AT BEDTIME
Refills: 0 | Status: DISCONTINUED | OUTPATIENT
Start: 2025-05-12 | End: 2025-05-13

## 2025-05-12 RX ORDER — EMPAGLIFLOZIN 25 MG/1
1 TABLET, FILM COATED ORAL
Refills: 0 | DISCHARGE

## 2025-05-12 RX ADMIN — Medication 650 MILLIGRAM(S): at 21:48

## 2025-05-12 RX ADMIN — Medication 25 MILLIGRAM(S): at 15:46

## 2025-05-12 RX ADMIN — ATORVASTATIN CALCIUM 10 MILLIGRAM(S): 80 TABLET, FILM COATED ORAL at 21:18

## 2025-05-12 RX ADMIN — SODIUM CHLORIDE 1000 MILLILITER(S): 9 INJECTION, SOLUTION INTRAVENOUS at 08:33

## 2025-05-12 RX ADMIN — APIXABAN 5 MILLIGRAM(S): 2.5 TABLET, FILM COATED ORAL at 03:26

## 2025-05-12 RX ADMIN — POLYETHYLENE GLYCOL 3350 17 GRAM(S): 17 POWDER, FOR SOLUTION ORAL at 14:58

## 2025-05-12 RX ADMIN — APIXABAN 5 MILLIGRAM(S): 2.5 TABLET, FILM COATED ORAL at 14:58

## 2025-05-12 RX ADMIN — Medication 650 MILLIGRAM(S): at 21:18

## 2025-05-12 RX ADMIN — DAPAGLIFLOZIN 10 MILLIGRAM(S): 5 TABLET, FILM COATED ORAL at 14:57

## 2025-05-12 NOTE — SPEECH LANGUAGE PATHOLOGY EVALUATION - COMMENTS
*Seen by this service for a communication assessment 7/2023 and found w/ cognitive linguistic deficits and mild dysarthria. THIS ADMISSION: Passed dysphagia screen 5/12. *Seen by this service for a communication assessment 7/2023 and found w/ cognitive linguistic deficits and mild dysarthria. THIS ADMISSION: Passed dysphagia screen 5/12. Per d/w patient, endorses feeling his "tongue gets tied" and he has trouble "getting words out." However, shares that this difficulty is not new and has been since stroke in July 2023. Reports going to [facility] post d/c in 2023, does not recall receiving speech therapy. SLP d/w patient's son for collateral information - son shares father's communication has been "pretty much the same" since last stroke, but reports increased confusion and "not making sense" upon this admission. Son also reports no hx of speech therapy. goal: the patient will improve speech intelligibility in order to enhance functional communication of wants/needs; the patient will improve expressive/receptive language skills in order to enhance functional communication of wants/needs; the patient will improve cognitive skills in order to enhance functional communication of wants/needs    d/w patient; spouse; son; MD Almeida difficulty answering yes/no questions and open-ended questions of increasing complexity; minimal cueing was beneficial in achieving target responses. on clock drawing task, patient adal circles of 3 different sizes (small, medium, large) in an attempt to self-correct. number places fairly accurate however spacing incorrect; time/hand placement WFL. patient seemingly growing frustrated during more complex tasks, SLP provided emotional support and encouragement.

## 2025-05-12 NOTE — ED PROVIDER NOTE - CLINICAL SUMMARY MEDICAL DECISION MAKING FREE TEXT BOX
65-year-old male with history of HTN, HLD, DM, CVA without any residual deficits presents to the emergency department as a transfer from [facility] for neurology/stroke evaluation. Last known normal 1530 5/11.  Started having debility, left-sided weakness.  Patient unable to lift against gravity of left lower and upper extremity per documentation from OSH.  Patient arrives, strength 5/5 in all extremities.  Sensation.  Patient with very mild right facial droop and mild slurred speech.  AO x 4.  Concern for TIA versus CVA.  Patient outside window for TNK.  Neurology at bedside.  Plan for repeat labs, CTs, EKG.  Dispo likely admission.

## 2025-05-12 NOTE — H&P ADULT - NSHPPHYSICALEXAM_GEN_ALL_CORE
PHYSICAL EXAM:     General - NAD  Cardiovascular - Peripheral pulses palpable, no edema    NEURO:    Mental status - Awake, Alert, Oriented to person, place, and time. Speech with phonetic paraphasic errors, able to repeat and name with some errors. Follows simple and complex commands.     Cranial nerves -   PERRL, L inferior quadrantanopsia, EOMI,   face sensation (V1-V3) intact b/l,   mild R angle of mouth drrop, patient feels its his normal smile upon showing mirror  tongue midline on protrusion with full lateral movement    Motor - Normal bulk and tone throughout. No pronator drift.  Strength testing            Deltoid      Biceps      Triceps     Wrist Extension    Wrist Flexion     Interossei         R            5                 5               5                     5                              5                        5                 5  L             5                 5               5                     5                              5                        5                 4+              Hip Flexion    Hip Extension    Knee Flexion    Knee Extension    Dorsiflexion    Plantar Flexion  R              5                           5                       5                           5                            5                          5  L              5                           5                        5                           5                            4+                          4+    Sensation - Light touch intact throughout    DTR's -             Biceps      Triceps     Brachioradialis      Patellar    Ankle    Toes/plantar response  R             2+             2+                  2+                       2+            2+                 mute  L              2+             2+                 2+                        2+           2+                 Down    Coordination - Finger to Nose intact b/l. Heel to Arvizu intact b/l. No tremors appreciated    Gait and station - Slow cautious gait. Romberg negative.

## 2025-05-12 NOTE — PHYSICAL THERAPY INITIAL EVALUATION ADULT - GENERAL OBSERVATIONS, REHAB EVAL
Patient received semi reclined in bed, NAd, VSS, +tele, +PIV infusing, patient removed condom catheter (RN notified)

## 2025-05-12 NOTE — SPEECH LANGUAGE PATHOLOGY EVALUATION - SLP DIAGNOSIS
Patient is a 65 year old male with pmhx of cardiac amyloidosis, CVA, p/w worsening left sided weakness and slurred speech, f/w recrudescence of previous R MCA stroke vs R acute ischemic stroke. Patient now presents with: 1) mild dysarthria; 2) receptive aphasia as evidenced by difficulty with answering yes/no and open-ended questions of increasing complexity; 3) expressive aphasia as evidenced by occasional semantic paraphasias, WFD more notable during informal conversations vs. structured tasks; 4) cognitive-linguistic deficits in the areas of memory, attention, organization, and mental flexibility. Minimal cueing throughout encounter was beneficial for patient to achieve target responses. SLP services are warranted at next level of care. This service will continue to follow.

## 2025-05-12 NOTE — H&P ADULT - NSHPADDITIONALINFOADULT_GEN_ALL_CORE
65-year-old right-handed male w/ PMHx cardiac amyloidosis, stroke (2023, reports no residual deficits, but unclear), on apixaban 5 mg BID (reports adherence, normal PTT, PT, INR on initial [facility] labs 5/12), initially presenting to [facility] due to worsening/new left-sided weakness and slurred speech.     Patient previously seen by inpatient [facility] stroke neurology, at which time he presented 7/2023 for left hemiparesis, left homonymous hemianopia, and dysarthria, found to have right MCA infarct s/p R M1/M2 thrombectomy TICI 2A. TTE (2023): 36% EF, severe LV diastolic dysfunction, increased LV mass & concentric hypertrophy, LA mildly dilated, interatrial septum intact. Mechanism believed to be "cardioembolism from amyloidosis and risk of atrial fibrillation vs cardiac amyloidosis." Patient's , started on atorvastatin 80 mg qHS. He was recommended to start apixaban 5 mg BID after repeat CTH outpatient.    CTH (5/12/25): Chronic R MCA infarct.  CTA H/N (5/12/25): No evidence significant stenosis or occlusion.  A1c (5/12/2025): 4.3%. 65-year-old right-handed male w/ PMHx cardiac amyloidosis, stroke (2023, reports no residual deficits, but unclear), on apixaban 5 mg BID (reports adherence, normal PTT, PT, INR on initial [facility] labs 5/12), initially presenting to [facility] due to worsening/new left-sided weakness and slurred speech.     Of note, patient previously seen by inpatient [facility] stroke neurology, at which time he presented 7/2023 for left hemiparesis, left homonymous hemianopia, and dysarthria, found to have right MCA infarct s/p R M1/M2 thrombectomy TICI 2A. TTE (2023): 36% EF, severe LV diastolic dysfunction, increased LV mass & concentric hypertrophy, LA mildly dilated, interatrial septum intact. Mechanism believed to be "cardioembolism from amyloidosis and risk of atrial fibrillation vs cardiac amyloidosis." Patient's , started on atorvastatin 80 mg qHS. He was recommended to start apixaban 5 mg BID after repeat CTH outpatient.    While at [facility], patient and family requesting to be transferred to [facility] as patient was seen at [facility] for his 2023 stroke, and they felt more comfortable with continuity of care and [facility] having his pertinent records. Patient transferred to [facility], admitted to stroke service.    CTH (5/12/25): Chronic R MCA infarct.  CTA H/N (5/12/25): No evidence significant stenosis or occlusion.  A1c (5/12/2025): 4.3%.    On 5/12/25 re-evaluation during morning rounds, patient reporting difficulty using his left hand when trying to use objects, including his phone. On examination, patient eyes open, attends to examiner, fluent speech, follows midline crossing commands, EOMI at least horizontally, VFF, extinguishes left visual field on dual simultaneous visual stimulation, no facial asymmetry, intact to temperature b/l V1-V3, no drift in b/l UE, no obvious orbiting on arm rolling test, no drift in b/l LE, intact sensation to temperature, extinction of left limbs on dual cutaneous stimulation, bisects horizontal line correctly, correctly describes cookie theft picture (although focused on the right side of picture primarily in the beginning).    Impression: Left extinction +/- left hemiparesis localizable to right brain dysfunction, suspected to be due to right brain acute ischemic infarct vs recrudescence of prior R MCA chronic stroke.    Plan: as documented above in resident's note, with few minor changes.    -  Jareth Key MD  Neurovascular Fellow

## 2025-05-12 NOTE — SPEECH LANGUAGE PATHOLOGY EVALUATION - H & P REVIEW
64yo RH M w/ pmhx of cardiac amyloidosis, acute ischemic stroke in 2023 (mild residual left arm weakness and dysarthria/aphasia), on eliquis for secondary stroke prevention i/s/o amyloidosis, comes in w worsening left sided weakness and slurred speech. As per patient, LKW 1200 5/11, when he was at [employer], at baseline, but later on in afternoon, when he appeared confused, not knowing where he was, where he kept his keys, also felt weak on the left arm and leg and family felt he was slurring his words and words were difficult to come out. Episode lasted till the evening when he went to [facility], was seen as a code stroke but was deemed not to be tenecteplase candidate due to being on eliquis. As per patient, last time he took eliquis was 5/11 AM. Patient was transported to [facility] for further stroke w/u and management as per family request. At [facility], patient feels he is back to his normal strength and speech but has chronic issues with his speech since stroke in 2023 and sees speech therapy for the same; feels his strength is back to normal baseline on his left side. He denies any acute changes in vision, denies any acute numbness, denies any headache, dizziness, denies any recent head injury. CTH/CTA showed nothing acute. MRI w/u pending. LKW 1200 5/11. MRS 0. NIHSS 3(L hemianopsia, R face, L drift. all chronic) out of window for tenecteplase; not a candidate for thrombectomy as no LVO. Impression -> Worsening L hemiparesis, dysarthria could be recrudescence to previous R MCA stroke vs acute ischemic stroke in R cerebral hemisphere. Mechanism hyper coag 2/2 amyloidosis./yes

## 2025-05-12 NOTE — PATIENT PROFILE ADULT - FUNCTIONAL ASSESSMENT - DAILY ACTIVITY SECTION LABEL
. Mildly to Moderately Impaired: difficulty hearing in some environments or speaker may need to increase volume or speak distinctly

## 2025-05-12 NOTE — SPEECH LANGUAGE PATHOLOGY EVALUATION - SLP GENERAL OBSERVATIONS
Patient received OOB in chair; on RA; spouse present; AAOx4; +aphasia (receptive > expressive); able to follow all commands for the purpose of this evaluation. Son arrived at bedside mid-way through evaluation.

## 2025-05-12 NOTE — ED PROVIDER NOTE - PHYSICAL EXAMINATION
Constitutional: VS reviewed. Alert and orientedx3, well appearing, no apparent distress  HEENT: Atraumatic, EOMI, PERRL, mild R facial droop  CV: RRR  Lungs: Clear and equal bilaterally, no wheezes, rales or crackles  Abdomen: Soft, nondistended, nontender  MSK: No deformities, extremities warm and well perfused  Skin: Warm and dry  Neuro: Moving all extremities. Strength 5/5 in all extremities. Sensation intact. Mild right facial droop. Mild slurred speech.   Lymph: No pitting edema in extremities.

## 2025-05-12 NOTE — ED PROVIDER NOTE - ATTENDING CONTRIBUTION TO CARE
Colton Couch MD (Attending Physician):    I performed a history and physical exam of the patient and discussed their management with the resident/fellow/ACP/student. I have reviewed the resident/fellow/ACP/student note and agree with the documented findings and plan of care, except as noted. I have personally performed a substantive portion of the visit including all aspects of the medical decision making. My medical decision making and observations are found below. Please refer to any progress notes for updates on clinical course.    HPI:  65-year-old male with history of HTN, HLD, DM, CVA without any residual deficits presents to the emergency department as a transfer from [facility] for neurology/stroke evaluation.  Code stroke activated upon arrival.  Patient last known normal at 3:30 PM 5/11/2025.  Patient presented at OSH for left sided weakness, difficulty ambulating, and slurred speech.  Patient found to have significant left-sided weakness, unable to lift against gravity according to documentation.  Patient had CT imaging without acute findings.  Patient transferred here for evaluation by neurology for concern for CVA.  Patient endorsing mild headache that has improved.  Patient also with significant improvement in weakness.  Neurology at bedside.  Patient brought directly to CT scanner for repeat imaging.  Denies vision changes, chest pain, fevers or chills, shortness of breath, abdominal pain, nausea or vomiting, dysuria, hematuria.  Pt didn't receive TNK at OSH.    PE:  Vitals noted  GEN - NAD, well appearing, A&Ox3  HEAD - NC/AT  EYES - PERRL, EOMI  ENT - Airway patent, mucous membranes moist  NECK - Supple, non-tender without lymphadenopathy, no masses  PULMONARY - Normal wob, CTA b/l, symmetric breath sounds, no W/R/R, satting 98% on RA  CARDIAC - +S1S2, RRR, no M/G/R, no JVD  ABDOMEN - +BS, ND, NT, soft, no guarding, no rebound, no masses, no rigidity   - No CVA TTP b/l  EXTREMITIES - FROM, symmetric pulses, no edema  SKIN - No rash or bruising  NEUROLOGIC - Alert, +slightly slurred speech, moving all extremities spontaneously, +slight left facial droop (other CN II-XII grossly intact), no pronator drift, +unable to assess gait at this time, sensation grossly intact, FTN negative b/l. +Strength minimally decreased in LUE and LLE.  PSYCH - Normal mood/affect, normal insight    MDM:  DDx includes, but not limited to: stroke, complex migraine, Johnny's paralysis, metabolic derangement. Code stroke activated as pt is still within the window for thrombectomy for potential LVO. Pt didn't receive TNK at OSH, no longer within the window for thrombolytics. Neurology at bedside. ekg, CT head, CTA head/neck, labs. Pending neurology recs. Will require admission.

## 2025-05-12 NOTE — PATIENT PROFILE ADULT - FALL HARM RISK - HARM RISK INTERVENTIONS
Assistance with ambulation/Assistance OOB with selected safe patient handling equipment/Communicate Risk of Fall with Harm to all staff/Discuss with provider need for PT consult/Monitor gait and stability/Reinforce activity limits and safety measures with patient and family/Tailored Fall Risk Interventions/Visual Cue: Yellow wristband and red socks/Bed in lowest position, wheels locked, appropriate side rails in place/Call bell, personal items and telephone in reach/Instruct patient to call for assistance before getting out of bed or chair/Non-slip footwear when patient is out of bed/[place] to call system/Physically safe environment - no spills, clutter or unnecessary equipment/Purposeful Proactive Rounding/Room/bathroom lighting operational, light cord in reach

## 2025-05-12 NOTE — ED PROVIDER NOTE - OBJECTIVE STATEMENT
65-year-old male with history of HTN, HLD, DM, CVA without any residual deficits presents to the emergency department as a transfer from [facility] for neurology/stroke evaluation.  Code stroke activated upon arrival.  Patient last known normal at 3:30 PM 5/11/2025.  Patient presented at OSH for left sided weakness, difficulty ambulating and slurred speech.  Patient found to have significant left-sided weakness, unable to lift against gravity according to documentation.  Patient had CT imaging without acute findings.  Patient transferred here for evaluation by neurology for concern for CVA.  Patient endorsing mild headache that has improved.  Patient also with significant improvement in weakness.  Sleeping.  Neurology at bedside.  Patient brought directly to CT scanner for repeat imaging.  Denies vision changes, chest pain, fevers or chills, shortness of breath, abdominal pain, nausea or vomiting, dysuria, hematuria.

## 2025-05-12 NOTE — ED PROVIDER NOTE - CARE PLAN
Principal Discharge DX:	Stroke   1 Principal Discharge DX:	Stroke  Secondary Diagnosis:	Focal seizure

## 2025-05-12 NOTE — SPEECH LANGUAGE PATHOLOGY EVALUATION - SLP ATTENTION
unable to count backwards from 100. provided "100, 93' with patient noted to perseverate on increments of 10 vs. 7, despite maximum cueing/impaired

## 2025-05-12 NOTE — OCCUPATIONAL THERAPY INITIAL EVALUATION ADULT - PERTINENT HX OF CURRENT PROBLEM, REHAB EVAL
65-year-old male with history of HTN, HLD, DM, CVA without any residual deficits presents to the emergency department as a transfer from [facility] for neurology/stroke evaluation.  Code stroke activated upon arrival.  Patient last known normal at 3:30 PM 5/11/2025.  Patient presented at OSH for left sided weakness, difficulty ambulating and slurred speech.  Patient found to have significant left-sided weakness, unable to lift against gravity according to documentation.  Patient had CT imaging without acute findings.  Patient transferred here for evaluation by neurology for concern for CVA.  5/12/25: CT head: No acute intracranial hemorrhage, mass effect, or midline shift. If clinical symptoms persist or worsen, more sensitive evaluation with brain MRI may be obtained, if no contraindications exist.  Chronic right MCA infarct.  CTA NECK: No evidence of significant stenosis or occlusion.  CTA HEAD: No large vessel occlusion, significant stenosis or vascular abnormality identified.

## 2025-05-12 NOTE — H&P ADULT - NSHPLABSRESULTS_GEN_ALL_CORE
MRI Brain 7/13/23: Multiple acute infarcts noted in the right MCA territory largest area measuring 7.9 x 3.4 cm. There are gradient susceptibility affects most   compatible with petechial hemorrhages. The larger area is not significantly changed in size given differences in technique    CTH (7/11/23 @ 11AM): Redemonstrated acute right MCA territory infarct without hemorrhagic conversion    (7/11/23):   NONCONTRAST HEAD CT SCAN:  1.  Redemonstration of large acute infarct in the inferior division of   the right MCA vascular territory, involving posterior right temporal   lobe, right temporal parietal region, and lateral right occipital lobe.  2.  There is new patchy hypoattenuation in the subcortical/deep white   matter of the right frontal lobe and new loss of gray matter-white matter   differentiation in the right postcentral gyrus, compatible with new   developing infarct in the right middle cerebral artery vascular territory.  3.  No hemorrhagic conversion.    CT ANGIOGRAPHY NECK:  1.  Again seen is a beaded appearance of both internal carotid arteries,   suspicious for fibromuscular dysplasia.  2.  Partial anomalous pulmonary venous return is noted in the left upper   lobe.    CT ANGIOGRAPHY BRAIN:   Compared to the prior CTA on 07/10/2023, there is   flow related enhancement within the inferior division of the right MCA at   site of prior occlusion.  Slightly distal to the location of the original   occlusion, there are now severe stenoses versus focal occlusions within   two proximal M2 branches of the inferior division of the right middle   cerebral artery (see key images, 605:58-60, and 5:414-430); and there is   now a distal M2 occlusion in the inferior division of the right middle   cerebral artery (605:61-62 and 5:447-449).    (7/10/23):  CT head: Acute Rt temporal parietal infarct without hemorrhagic conversion.  CTA: Bilateral internal carotid artery fibromuscular dysplasia. Right distal M1 occlusion.  CTP: Core infarct in the right parietal temporal region of 23 mL with a penumbra of 104 mL

## 2025-05-12 NOTE — H&P ADULT - HISTORY OF PRESENT ILLNESS
64yo RH M w [facility] of cardiac amyloidosis, acute ischemic stroke in 2023 mild residual left arm weakness and dysarthria/aphasia, on eliquis for secondary stroke prevention i/s/o amyloidosis, comes in w worsening left sided weakness and slurred speech. As per patient, LKW 1200 5/11, when he was at [place], at baseline, but later on in afternoon, when he appeared confused, not knowing where he was, where he kept his keys, also felt weak on the left arm and leg and family felt he was slurring his words and words were difficult to come out. Episode lasted till the evening when he went to [facility], was seen as a code stroke but was deemed not to be tenecteplase candidate due to being on eliquis. As per patient, last time he took eliquis was 5/11 AM. Patient was transported to [facility] for further stroke workup and management as per family request. At [facility], patient feels he is back to his normal strength and speech but has chronic issues with his speech since stroke in 2023 and sees speech therapy for the same; feels his strength is back to normal baseline on his left side. He denies any acute changes in vision, denies any acute numbness, denies any headache, dizziness, denies any recent head injury.     During stroke admission in 2023, p/w L hemiparesis, dysarthria, found to have R M1 occlusion, thrombectomy s/p TICI 2A, mechanism hypercoagulability 2/2 cardiac amyloidosis, on eliquis 5mg BID since. At baseline he doesn't need assistance to walk.

## 2025-05-12 NOTE — ED ADULT NURSE NOTE - NSFALLHARMRISKINTERV_ED_ALL_ED
Assistance OOB with selected safe patient handling equipment if applicable/Assistance with ambulation/Communicate risk of Fall with Harm to all staff, patient, and family/Monitor gait and stability/Provide visual cue: red socks, yellow wristband, yellow gown, etc/Reinforce activity limits and safety measures with patient and family/Bed in lowest position, wheels locked, appropriate side rails in place/Call bell, personal items and telephone in reach/Instruct patient to call for assistance before getting out of bed/chair/stretcher/Non-slip footwear applied when patient is off stretcher/[place] to call system/Physically safe environment - no spills, clutter or unnecessary equipment/Purposeful Proactive Rounding/Room/bathroom lighting operational, light cord in reach

## 2025-05-12 NOTE — H&P ADULT - ASSESSMENT
Assessment:  66yo RH M w cardiac amyloidosis, stroke in 2023(R M1 occlusion, s/p thrombectomy w TICI2A reperfusion, on eliquis 5mg BID 2/2 mechanism being hypercoag 2/2 amyloidosis, w mild residual left sided weakness and ?mild aphasia), comes in w worsening L hemiparesis and slurred speech, LKW 1200 5/11. Currently patient feels back to baseline. Patient compliant w eliquis, last dose 5/11 AM. On exam, /61, mild phonemic paraphasic errors, LI quadrantanopia, mild L UE, LLE weakness on confrontation. NIHSS 3. CTH/CTA showed nothing acute.     LKW 1200 5/11  MRS 0  NIHSS 3(L hemianopsia, R face, L drift. all chronic)  out of window for tenecteplase  not a candidate for thrombectomy as no LVO    Impression: Worsening L hemiparesis, dysarthria could be recrudescence to previous R MCA stroke vs acute ischemic stroke in R cerebral hemisphere. Mechanism hyper coag 2/2 amyloidosis.     Recs:  [] continue home eliquis 5mg BID  [] continue home atorvastatin 10mg qhs    Workup:  [] MRI brain w/o contrast to rule out stroke  [] TTE without bubble study and telemetry to look for a cardiac source of embolism.  [] HbA1C and Lipid Panel    Other:  [] Telemonitoring;  Neurochecks and Vital signs q4h  [] Permissive HTN up to 220/120 mmHg for first 24 hours after the symptom onset followed by gradual normotension to <140/90  [] BGM goals 140-180    [] NPO until clears Dysphagia screen, otherwise Swallow evaluation  [] DVT prophylaxis  [] PT/OT evaluation    [] Stroke education provided    #Cardiac amyloidosis  [] repeat TTE  [] held jardiance as non formulary  [] will consider medicine/ cardiology input for alternatives    Case discussed with stroke fellow Dr. Key under the supervision of Dr. Pereyra Assessment:  64yo RH M w cardiac amyloidosis, stroke in 2023(R M1 occlusion, s/p thrombectomy w TICI2A reperfusion, on eliquis 5mg BID 2/2 mechanism being hypercoag 2/2 amyloidosis, w mild residual left sided weakness and ?mild aphasia), comes in w worsening L hemiparesis and slurred speech, LKW 1200 5/11. Currently patient feels back to baseline. Patient compliant w eliquis, last dose 5/11 AM. On exam, /61, mild phonemic paraphasic errors, LI quadrantanopia, mild L UE, LLE weakness on confrontation. NIHSS 3. CTH/CTA showed nothing acute.     LKW 1200 5/11  MRS 0  NIHSS 3(L hemianopsia, R face, L drift. all chronic)  out of window for tenecteplase  not a candidate for thrombectomy as no LVO    Impression: Worsening L hemiparesis, dysarthria could be recrudescence to previous R MCA stroke vs acute ischemic stroke in R cerebral hemisphere. Mechanism hyper coag 2/2 amyloidosis.     Recs:  [] continue home apixaban 5 mg BID  [] continue home atorvastatin 10 mg qhs - increase atorvastatin if LDL > 70.    Workup:  [] MRI brain w/o contrast to rule out stroke  [] TTE without bubble study and telemetry to look for a cardiac source of embolism.  [] HbA1C and Lipid Panel    Other:  [] Telemonitoring;  Neurochecks and Vital signs q4h  [] Permissive HTN up to 220/120 mmHg for first 24 hours after the symptom onset followed by gradual normotension to <140/90  [] BGM goals 140-180    [] NPO until clears Dysphagia screen, otherwise Swallow evaluation  [] DVT prophylaxis  [] PT/OT evaluation    [] Stroke education provided    #Cardiac amyloidosis  [] repeat TTE w/o bubble  [] held jardiance as non formulary  [] will consider medicine/ cardiology input for alternatives    Case discussed with stroke fellow Dr. Key under the supervision of Dr. Pereyra

## 2025-05-12 NOTE — H&P ADULT - ATTENDING COMMENTS
I reviewed available diagnostic studies, and reviewed images personally. I agree with resident's history, exam, orders placed, and plan of care. Total care time spent, 75 min. This excludes any time spent on separate procedures or teaching. Medical issues needing to be addressed include: Evaluation for cerebral ischemia, cardiac amyloidosis on apixaban 5mg BID w/ stroke in 2023, worsening L hemiparesis, dysarthria. F up ECHO, pt sees Dr. Ayush Romero, notified, prior ECHO as above. Repeat ECHO. MRI pending. Exam shows some L side tactile extinction (intact visual), some coordination difficulty of the L hand with minimal weakness.  OK to continue home Eliquis for now.

## 2025-05-12 NOTE — PHYSICAL THERAPY INITIAL EVALUATION ADULT - PERTINENT HX OF CURRENT PROBLEM, REHAB EVAL
65-year-old male with history of HTN, HLD, DM, CVA without any residual deficits presents to the emergency department as a transfer from [facility] for neurology/stroke evaluation.  Code stroke activated upon arrival.  Patient last known normal at 3:30 PM 5/11/2025.  Patient presented at OSH for left sided weakness, difficulty ambulating and slurred speech.  Patient found to have significant left-sided weakness, unable to lift against gravity according to documentation.  Patient had CT imaging without acute findings.  Patient transferred here for evaluation by neurology for concern for CVA.  5/12/25: CT head: No acute intracranial hemorrhage, mass effect, or midline shift. If clinical symptoms persist or worsen, more sensitive evaluation with brain MRI may be obtained, if no contraindications exist.  Chronic right MCA infarct.  CTA NECK: No evidence of significant stenosis or occlusion.  CTA HEAD: No large vessel occlusion, significant stenosis or vascular abnormality identified.

## 2025-05-12 NOTE — ED ADULT NURSE NOTE - OBJECTIVE STATEMENT
66 y/o M AxOx4 presents to the ED via EMS as a transfer from [facility]. Patient has a past medical history of HTN, HLD, DM, CVA on Eliquis for further stroke evaluation. As per EMS and patient, patient had slurred speech, left sided weakness, headache and unsteady gait which has now resolved. Code stroke activated upon evaluation. Patient has a patent airway, breathing is unlabored and spontaneous. Denies chest pain, shortness of breath, blurry vision, cough, chills, numbness, N/V/D, recent sick contact/travel, abdominal pain. Patient placed in gown, side rails up for safety, bed in lowest position, advised to ask RN if assistance is needed, patient and family educated on plan of care, comfort and safety provided.

## 2025-05-12 NOTE — OCCUPATIONAL THERAPY INITIAL EVALUATION ADULT - ASSISTIVE DEVICE FOR TRANSFER: SIT/STAND, REHAB EVAL
Returned to the unit from home. Reports she had forgot her mediport was accessed and left for home after seeing the physician. rolling walker

## 2025-05-13 ENCOUNTER — TRANSCRIPTION ENCOUNTER (OUTPATIENT)
Age: 65
End: 2025-05-13

## 2025-05-13 ENCOUNTER — RESULT REVIEW (OUTPATIENT)
Age: 65
End: 2025-05-13

## 2025-05-13 VITALS
DIASTOLIC BLOOD PRESSURE: 67 MMHG | OXYGEN SATURATION: 98 % | SYSTOLIC BLOOD PRESSURE: 105 MMHG | RESPIRATION RATE: 18 BRPM | HEART RATE: 71 BPM | TEMPERATURE: 98 F

## 2025-05-13 LAB
ADD ON TEST-SPECIMEN IN LAB: SIGNIFICANT CHANGE UP
ANION GAP SERPL CALC-SCNC: 13 MMOL/L — SIGNIFICANT CHANGE UP (ref 5–17)
APPEARANCE UR: CLEAR — SIGNIFICANT CHANGE UP
BACTERIA # UR AUTO: NEGATIVE /HPF — SIGNIFICANT CHANGE UP
BASOPHILS # BLD AUTO: 0.08 K/UL — SIGNIFICANT CHANGE UP (ref 0–0.2)
BASOPHILS NFR BLD AUTO: 0.9 % — SIGNIFICANT CHANGE UP (ref 0–2)
BILIRUB UR-MCNC: NEGATIVE — SIGNIFICANT CHANGE UP
BUN SERPL-MCNC: 14 MG/DL — SIGNIFICANT CHANGE UP (ref 7–23)
CALCIUM SERPL-MCNC: 9.2 MG/DL — SIGNIFICANT CHANGE UP (ref 8.4–10.5)
CAST: 0 /LPF — SIGNIFICANT CHANGE UP (ref 0–4)
CHLORIDE SERPL-SCNC: 104 MMOL/L — SIGNIFICANT CHANGE UP (ref 96–108)
CHOLEST SERPL-MCNC: 135 MG/DL — SIGNIFICANT CHANGE UP
CO2 SERPL-SCNC: 21 MMOL/L — LOW (ref 22–31)
COLOR SPEC: YELLOW — SIGNIFICANT CHANGE UP
CREAT SERPL-MCNC: 1.07 MG/DL — SIGNIFICANT CHANGE UP (ref 0.5–1.3)
DIFF PNL FLD: NEGATIVE — SIGNIFICANT CHANGE UP
EGFR: 77 ML/MIN/1.73M2 — SIGNIFICANT CHANGE UP
EGFR: 77 ML/MIN/1.73M2 — SIGNIFICANT CHANGE UP
EOSINOPHIL # BLD AUTO: 0.25 K/UL — SIGNIFICANT CHANGE UP (ref 0–0.5)
EOSINOPHIL NFR BLD AUTO: 2.8 % — SIGNIFICANT CHANGE UP (ref 0–6)
GLUCOSE SERPL-MCNC: 76 MG/DL — SIGNIFICANT CHANGE UP (ref 70–99)
GLUCOSE UR QL: >=1000 MG/DL
HCT VFR BLD CALC: 46.7 % — SIGNIFICANT CHANGE UP (ref 39–50)
HDLC SERPL-MCNC: 69 MG/DL — SIGNIFICANT CHANGE UP
HGB BLD-MCNC: 15 G/DL — SIGNIFICANT CHANGE UP (ref 13–17)
IMM GRANULOCYTES NFR BLD AUTO: 0.4 % — SIGNIFICANT CHANGE UP (ref 0–0.9)
KETONES UR QL: NEGATIVE MG/DL — SIGNIFICANT CHANGE UP
LDLC SERPL-MCNC: 50 MG/DL — SIGNIFICANT CHANGE UP
LEUKOCYTE ESTERASE UR-ACNC: NEGATIVE — SIGNIFICANT CHANGE UP
LIPID PNL WITH DIRECT LDL SERPL: 50 MG/DL — SIGNIFICANT CHANGE UP
LYMPHOCYTES # BLD AUTO: 2.23 K/UL — SIGNIFICANT CHANGE UP (ref 1–3.3)
LYMPHOCYTES # BLD AUTO: 24.7 % — SIGNIFICANT CHANGE UP (ref 13–44)
MAGNESIUM SERPL-MCNC: 2 MG/DL — SIGNIFICANT CHANGE UP (ref 1.6–2.6)
MCHC RBC-ENTMCNC: 29.8 PG — SIGNIFICANT CHANGE UP (ref 27–34)
MCHC RBC-ENTMCNC: 32.1 G/DL — SIGNIFICANT CHANGE UP (ref 32–36)
MCV RBC AUTO: 92.8 FL — SIGNIFICANT CHANGE UP (ref 80–100)
MONOCYTES # BLD AUTO: 0.76 K/UL — SIGNIFICANT CHANGE UP (ref 0–0.9)
MONOCYTES NFR BLD AUTO: 8.4 % — SIGNIFICANT CHANGE UP (ref 2–14)
NEUTROPHILS # BLD AUTO: 5.66 K/UL — SIGNIFICANT CHANGE UP (ref 1.8–7.4)
NEUTROPHILS NFR BLD AUTO: 62.8 % — SIGNIFICANT CHANGE UP (ref 43–77)
NITRITE UR-MCNC: NEGATIVE — SIGNIFICANT CHANGE UP
NONHDLC SERPL-MCNC: 65 MG/DL — SIGNIFICANT CHANGE UP
NRBC BLD AUTO-RTO: 0 /100 WBCS — SIGNIFICANT CHANGE UP (ref 0–0)
PH UR: 6 — SIGNIFICANT CHANGE UP (ref 5–8)
PHOSPHATE SERPL-MCNC: 3.5 MG/DL — SIGNIFICANT CHANGE UP (ref 2.5–4.5)
PLATELET # BLD AUTO: 254 K/UL — SIGNIFICANT CHANGE UP (ref 150–400)
POTASSIUM SERPL-MCNC: 4.2 MMOL/L — SIGNIFICANT CHANGE UP (ref 3.5–5.3)
POTASSIUM SERPL-SCNC: 4.2 MMOL/L — SIGNIFICANT CHANGE UP (ref 3.5–5.3)
PROT UR-MCNC: NEGATIVE MG/DL — SIGNIFICANT CHANGE UP
RBC # BLD: 5.03 M/UL — SIGNIFICANT CHANGE UP (ref 4.2–5.8)
RBC # FLD: 12.6 % — SIGNIFICANT CHANGE UP (ref 10.3–14.5)
RBC CASTS # UR COMP ASSIST: 1 /HPF — SIGNIFICANT CHANGE UP (ref 0–4)
SODIUM SERPL-SCNC: 138 MMOL/L — SIGNIFICANT CHANGE UP (ref 135–145)
SP GR SPEC: 1.02 — SIGNIFICANT CHANGE UP (ref 1–1.03)
SQUAMOUS # UR AUTO: 0 /HPF — SIGNIFICANT CHANGE UP (ref 0–5)
TRIGL SERPL-MCNC: 75 MG/DL — SIGNIFICANT CHANGE UP
UROBILINOGEN FLD QL: 1 MG/DL — SIGNIFICANT CHANGE UP (ref 0.2–1)
WBC # BLD: 9.02 K/UL — SIGNIFICANT CHANGE UP (ref 3.8–10.5)
WBC # FLD AUTO: 9.02 K/UL — SIGNIFICANT CHANGE UP (ref 3.8–10.5)
WBC UR QL: 0 /HPF — SIGNIFICANT CHANGE UP (ref 0–5)

## 2025-05-13 PROCEDURE — 93306 TTE W/DOPPLER COMPLETE: CPT

## 2025-05-13 PROCEDURE — 80053 COMPREHEN METABOLIC PANEL: CPT

## 2025-05-13 PROCEDURE — 84132 ASSAY OF SERUM POTASSIUM: CPT

## 2025-05-13 PROCEDURE — 82435 ASSAY OF BLOOD CHLORIDE: CPT

## 2025-05-13 PROCEDURE — 70498 CT ANGIOGRAPHY NECK: CPT

## 2025-05-13 PROCEDURE — 81001 URINALYSIS AUTO W/SCOPE: CPT

## 2025-05-13 PROCEDURE — 71045 X-RAY EXAM CHEST 1 VIEW: CPT

## 2025-05-13 PROCEDURE — 83735 ASSAY OF MAGNESIUM: CPT

## 2025-05-13 PROCEDURE — 85610 PROTHROMBIN TIME: CPT

## 2025-05-13 PROCEDURE — 99285 EMERGENCY DEPT VISIT HI MDM: CPT | Mod: 25

## 2025-05-13 PROCEDURE — 93356 MYOCRD STRAIN IMG SPCKL TRCK: CPT

## 2025-05-13 PROCEDURE — 84295 ASSAY OF SERUM SODIUM: CPT

## 2025-05-13 PROCEDURE — 82330 ASSAY OF CALCIUM: CPT

## 2025-05-13 PROCEDURE — 84484 ASSAY OF TROPONIN QUANT: CPT

## 2025-05-13 PROCEDURE — 93306 TTE W/DOPPLER COMPLETE: CPT | Mod: 26

## 2025-05-13 PROCEDURE — 83036 HEMOGLOBIN GLYCOSYLATED A1C: CPT

## 2025-05-13 PROCEDURE — 83605 ASSAY OF LACTIC ACID: CPT

## 2025-05-13 PROCEDURE — 85018 HEMOGLOBIN: CPT

## 2025-05-13 PROCEDURE — 82947 ASSAY GLUCOSE BLOOD QUANT: CPT

## 2025-05-13 PROCEDURE — 92523 SPEECH SOUND LANG COMPREHEN: CPT

## 2025-05-13 PROCEDURE — 76376 3D RENDER W/INTRP POSTPROCES: CPT

## 2025-05-13 PROCEDURE — 70496 CT ANGIOGRAPHY HEAD: CPT

## 2025-05-13 PROCEDURE — 85025 COMPLETE CBC W/AUTO DIFF WBC: CPT

## 2025-05-13 PROCEDURE — 85014 HEMATOCRIT: CPT

## 2025-05-13 PROCEDURE — 76376 3D RENDER W/INTRP POSTPROCES: CPT | Mod: 26

## 2025-05-13 PROCEDURE — 99223 1ST HOSP IP/OBS HIGH 75: CPT

## 2025-05-13 PROCEDURE — 99239 HOSP IP/OBS DSCHRG MGMT >30: CPT

## 2025-05-13 PROCEDURE — 36415 COLL VENOUS BLD VENIPUNCTURE: CPT

## 2025-05-13 PROCEDURE — 80048 BASIC METABOLIC PNL TOTAL CA: CPT

## 2025-05-13 PROCEDURE — 82962 GLUCOSE BLOOD TEST: CPT

## 2025-05-13 PROCEDURE — 97165 OT EVAL LOW COMPLEX 30 MIN: CPT

## 2025-05-13 PROCEDURE — 85730 THROMBOPLASTIN TIME PARTIAL: CPT

## 2025-05-13 PROCEDURE — 70450 CT HEAD/BRAIN W/O DYE: CPT

## 2025-05-13 PROCEDURE — 82803 BLOOD GASES ANY COMBINATION: CPT

## 2025-05-13 PROCEDURE — 71045 X-RAY EXAM CHEST 1 VIEW: CPT | Mod: 26

## 2025-05-13 PROCEDURE — 80061 LIPID PANEL: CPT

## 2025-05-13 PROCEDURE — 70551 MRI BRAIN STEM W/O DYE: CPT

## 2025-05-13 PROCEDURE — 84100 ASSAY OF PHOSPHORUS: CPT

## 2025-05-13 PROCEDURE — 93005 ELECTROCARDIOGRAM TRACING: CPT

## 2025-05-13 PROCEDURE — 97161 PT EVAL LOW COMPLEX 20 MIN: CPT

## 2025-05-13 RX ORDER — LACOSAMIDE 150 MG/1
50 TABLET, FILM COATED ORAL
Refills: 0 | Status: DISCONTINUED | OUTPATIENT
Start: 2025-05-13 | End: 2025-05-13

## 2025-05-13 RX ORDER — TAFAMIDIS MEGLUMINE 20 MG/1
1 CAPSULE, LIQUID FILLED ORAL
Qty: 0 | Refills: 0 | DISCHARGE

## 2025-05-13 RX ORDER — SPIRONOLACTONE 25 MG
1 TABLET ORAL
Qty: 0 | Refills: 0 | DISCHARGE
Start: 2025-05-13

## 2025-05-13 RX ORDER — LACOSAMIDE 150 MG/1
1 TABLET, FILM COATED ORAL
Qty: 60 | Refills: 0
Start: 2025-05-13

## 2025-05-13 RX ADMIN — Medication 25 MILLIGRAM(S): at 05:55

## 2025-05-13 RX ADMIN — APIXABAN 5 MILLIGRAM(S): 2.5 TABLET, FILM COATED ORAL at 09:17

## 2025-05-13 NOTE — DISCHARGE NOTE NURSING/CASE MANAGEMENT/SOCIAL WORK - PATIENT PORTAL LINK FT
You can access the FollowMyHealth Patient Portal offered by [facility] by registering at the following website: [email or web address]. By joining [employer]’s FollowMyHealth portal, you will also be able to view your health information using other applications (apps) compatible with our system.

## 2025-05-13 NOTE — PROGRESS NOTE ADULT - ASSESSMENT
66yo RH M w cardiac amyloidosis, stroke in 2023(R M1 occlusion, s/p thrombectomy w TICI2A reperfusion, on eliquis 5mg BID 2/2 mechanism being hypercoag 2/2 amyloidosis, w mild residual left sided weakness and ?mild aphasia), comes in w worsening L hemiparesis and slurred speech, LKW 1200 5/11. Currently patient feels back to baseline. Patient compliant w eliquis, last dose 5/11 AM. On exam, /61, mild phonemic paraphasic errors, LI quadrantanopia, mild L UE, LLE weakness on confrontation. NIHSS 3. CTH/CTA showed nothing acute.     Impression: Worsening L hemiparesis, dysarthria could be recrudescence to previous R MCA stroke. MRI Brain negative for acute infarct.      NEURO: Continue close monitoring for neurologic deterioration, permissive HTN to gradual normotension, A1C, LDL - titrate statin to LDL goal less than 70, not on high intensity statin due to, MRI Brain w/o, MRA Head w/o and Neck w/contrast. Physical therapy/OT/Speech eval/treatment.     ANTITHROMBOTIC THERAPY:     PULMONARY: CXR clear, protecting airway, saturating well     CARDIOVASCULAR: check TTE, cardiac monitoring                              SBP goal:     GASTROINTESTINAL:  dysphagia screen       Diet:     RENAL: BUN/Cr within normal limits, good urine output      Na Goal: Greater than 135     Lopez:    HEMATOLOGY: H/H without change, Platelets normal      DVT ppx: Heparin s.c [] LMWH []     ID: afebrile, no leukocytosis     OTHER: Plan discussed with patient and family at bedside, all questions and concerns addressed.     DISPOSITION: Rehab or home depending on PT eval once stable and workup is complete    CORE MEASURES:        Admission NIHSS:      Tenecteplase: [] YES [] NO      LDL/HDL:     Depression Screen:      Statin Therapy:     Dysphagia Screen: [] PASS [] FAIL     Smoking [] YES [] NO [] Smoking cessation and education provided to patient [] Nicotine patch ordered      Afib [] YES [] NO     Stroke Education [] YES [] NO    Obtain screening lower extremity venous ultrasound in patients who meet 1 or more of the following criteria as patient is high risk for DVT/PE on admission:   [] History of DVT/PE  []Hypercoagulable states (Factor V Leiden, Cancer, OCP, etc. )  []Prolonged immobility (hemiplegia/hemiparesis/post operative or any other extended immobilization)  [] Transferred from outside facility (Rehab or Long term care)  [] Age </= to 50 66yo RH M w cardiac amyloidosis, stroke in 2023(R M1 occlusion, s/p thrombectomy w TICI2A reperfusion, on eliquis 5mg BID 2/2 mechanism being hypercoag 2/2 amyloidosis, w mild residual left sided weakness and ?mild aphasia), comes in w worsening L hemiparesis and slurred speech, LKW 1200 5/11. Currently patient feels back to baseline. Patient compliant w eliquis, last dose 5/11 AM. On exam, /61, mild phonemic paraphasic errors, LI quadrantanopia, mild L UE, LLE weakness on confrontation. NIHSS 3. CTH/CTA showed nothing acute.     Impression: Worsening L hemiparesis, dysarthria in the setting of an episode of LUE/LLE shaking. Likely focal seizure in the setting of prior R MCA infact with postictal Johnny's paralysis. MRI Brain negative for acute infarct.     NEURO: Neurologically improved compared to yesterday. Pt describes episode of LUE/LLE shaking, seemed rhythmic, per pt could not suppress movements. Episode concerning for focal seizure. Will plan to start Vimpat 50mg BID with epilepsy follow up. A1C 4.3, LDL 50 - continue home dose statin as LDL within goal. MRI Brain w/o, CTA Head/Neck as above. Physical therapy/OT/Speech eval - Acute.     ANTITHROMBOTIC THERAPY: Eliquis 5mg BID    PULMONARY: protecting airway, saturating well     CARDIOVASCULAR: Cardiac monitoring without events. Continue home medications.                  SBP goal: < 160    GASTROINTESTINAL:  dysphagia screen passed     Diet: Regular    RENAL: BUN/Cr within normal limits, good urine output      Na Goal: Greater than 135     Lopez: No    HEMATOLOGY: H/H without change, Platelets normal      DVT ppx: Eliquis    ID: afebrile, no leukocytosis     OTHER: Plan discussed with patient at bedside, all questions and concerns addressed.     DISPOSITION: PT/OT rec rehab, however pt prefers to go home with home PT. D/c home today.     CORE MEASURES:        Admission NIHSS: 3     Tenecteplase: NO      LDL/HDL: 50/69     Depression Screen: p     Statin Therapy: yes     Dysphagia Screen: PASS      Smoking [] YES [x] NO [] Smoking cessation and education provided to patient [] Nicotine patch ordered      Afib NO     Stroke Education YES    Obtain screening lower extremity venous ultrasound in patients who meet 1 or more of the following criteria as patient is high risk for DVT/PE on admission:   [] History of DVT/PE  [] Hypercoagulable states (Factor V Leiden, Cancer, OCP, etc. )  [] Prolonged immobility (hemiplegia/hemiparesis/post operative or any other extended immobilization)  [] Transferred from outside facility (Rehab or Long term care)  [] Age </= to 50

## 2025-05-13 NOTE — PROVIDER CONTACT NOTE (OTHER) - ASSESSMENT
Upon assessment, pt remains to be neurologically stable, A&Ox4. Pt asymptomatic, denies chest pain, SOB, dizziness, etc.

## 2025-05-13 NOTE — DISCHARGE NOTE PROVIDER - NSDCCPCAREPLAN_GEN_ALL_CORE_FT
PRINCIPAL DISCHARGE DIAGNOSIS  Diagnosis: Focal seizure  Assessment and Plan of Treatment: Please follow up with neurologist as listed. Your presentation and clincal event of left sided shaking is suspicious for a focal seizure. You are at risk for seizures due to your prior stroke. Please start taking Vimpat 50mg twice a day as prescribed. Follow up with your stroke Neurologist and with the Epilepsy team.   Continue taking medications as prescribed. Monitor your blood pressure. Reduce fat, cholesterol and salt in your diet. Increase intake of fruits and vegetables. Limit alcohol to minimum and do not smoke. You may be at risk for falling, make changes to your home to help you walk easier. Keep up to date on vaccinations.  If you experience any symptoms of facial drooping, slurred speech, arm or leg weakness, severe headache, vision changes or any worsening symptoms, notify provider immediately and return to ER.

## 2025-05-13 NOTE — CONSULT NOTE ADULT - SUBJECTIVE AND OBJECTIVE BOX
Patient is a 65y old  Male who presents with a chief complaint of stroke like symtoms (12 May 2025 02:18)    Admission HPI:  64yo RH M w PNH of cardiac amyloidosis, acute ischemic stroke in 2023 mild residual left arm weakness and dysarthria/aphasia, on eliquis for secondary stroke prevention i/s/o amyloidosis, comes in w worsening left sided weakness and slurred speech. As per patient, LKW 1200 5/11, when he was at [employer], at baseline, but later on in afternoon, when he appeared confused, not knowing where he was, where he kept his keys, also felt weak on the left arm and leg and family felt he was slurring his words and words were difficult to come out. Episode lasted till the evening when he went to [facility], was seen as a code stroke but was deemed not to be tenecteplase candidate due to being on eliquis. As per patient, last time he took eliquis was 5/11 AM. Patient was transported to [facility] for further stroke workup and management as per family request. At [facility], patient feels he is back to his normal strength and speech but has chronic issues with his speech since stroke in 2023 and sees speech therapy for the same; feels his strength is back to normal baseline on his left side. He denies any acute changes in vision, denies any acute numbness, denies any headache, dizziness, denies any recent head injury.     During stroke admission in 2023, p/w L hemiparesis, dysarthria, found to have R M1 occlusion, thrombectomy s/p TICI 2A, mechanism hypercoagulability 2/2 cardiac amyloidosis, on eliquis 5mg BID since. At baseline he doesn't need assistance to walk.  (12 May 2025 02:18)    Interval History:  Patient had CTH - noted chronic R MCA CVA.  MRI pending.  Sitting in chair and feels comfortable.    REVIEW OF SYSTEMS: h/o chronic L weakness - improved weakness from yesterday but still off balance, No chest pain, shortness of breath, nausea, vomiting or diarhea; other ROS neg     PAST MEDICAL & SURGICAL HISTORY  Osteoarthritis of hip  ED (erectile dysfunction)  Lumbar spondylolysis  History of hip surgery    FUNCTIONAL HISTORY:   Lives w family in apartment - elevator access  PTA Independent    CURRENT FUNCTIONAL STATUS:  Min A transfers and gait    FAMILY HISTORY   N/C    MEDICATIONS   apixaban 5 milliGRAM(s) Oral every 12 hours  atorvastatin 10 milliGRAM(s) Oral at bedtime  dapagliflozin 10 milliGRAM(s) Oral daily  melatonin 3 milliGRAM(s) Oral at bedtime PRN  polyethylene glycol 3350 17 Gram(s) Oral daily    ALLERGIES  No Known Allergies    VITALS  T(C): 36.8 (05-12-25 @ 13:02), Max: 36.8 (05-12-25 @ 13:02)  HR: 71 (05-12-25 @ 13:02) (62 - 75)  BP: 114/69 (05-12-25 @ 13:02) (101/66 - 119/68)  RR: 18 (05-12-25 @ 13:02) (18 - 18)  SpO2: 95% (05-12-25 @ 13:02) (95% - 98%)  Wt(kg): --    PHYSICAL EXAM  Constitutional - NAD, Comfortable  HEENT - NCAT, EOMI  Neck - Supple  Chest - No distress, no use of accessory muscles for respiration  Cardiovascular -Well perfused  Abdomen - BS+, Soft, NTND  Extremities - No C/C/E, No calf tenderness   Neurologic Exam -                 AAO x 3  Motor LUE 4+/5 , LLE, RLE, LUE 5/5  Sensation intact   No clonus     Psychiatric - Mood stable, Affect WNL    RECENT LABS/IMAGING  CBC Full  -  ( 12 May 2025 01:19 )  WBC Count : 9.16 K/uL  RBC Count : 4.88 M/uL  Hemoglobin : 15.1 g/dL  Hematocrit : 45.3 %  Platelet Count - Automated : 255 K/uL  Mean Cell Volume : 92.8 fl  Mean Cell Hemoglobin : 30.9 pg  Mean Cell Hemoglobin Concentration : 33.3 g/dL  Auto Neutrophil # : x  Auto Lymphocyte # : x  Auto Monocyte # : x  Auto Eosinophil # : x  Auto Basophil # : x  Auto Neutrophil % : x  Auto Lymphocyte % : x  Auto Monocyte % : x  Auto Eosinophil % : x  Auto Basophil % : x    05-12    138  |  103  |  17  ----------------------------<  115[H]  4.3   |  22  |  1.08    Ca    9.2      12 May 2025 01:19    TPro  6.6  /  Alb  3.6  /  TBili  1.3[H]  /  DBili  x   /  AST  33  /  ALT  21  /  AlkPhos  125[H]  05-12    Urinalysis Basic - ( 12 May 2025 01:19 )    Color: x / Appearance: x / SG: x / pH: x  Gluc: 115 mg/dL / Ketone: x  / Bili: x / Urobili: x   Blood: x / Protein: x / Nitrite: x   Leuk Esterase: x / RBC: x / WBC x   Sq Epi: x / Non Sq Epi: x / Bacteria: x    Impression:  66 yo with new onset L sided weakness    Plan:  Continue w/u per neuro  PT- ROM, Bed Mob, Transfers, Amb w AD and bracing as needed  OT- ADLs, bracing  SLP- Dysphagia eval and treat  Prec- Falls, Cardiac  DVT Prophylaxis - Apixaban  Skin- Turn q2 h  Dispo- Will follow rehab needs as clinical course progresses    Total time taken to review relevant records and imaging results, examine patient, write note, and, when applicable, discuss case with patient, family, , resident, medical student and other medical providers:     [  ] 40 minutes (34091)  [X] 55 minutes (55969)  [  ] 75 minutes (08518)    [  ] 25 minutes (08045)  [  ] 35 minutes (27984)  [  ] 50 minutes (51970)          
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 As per patient, LKW 1200 /, when he was at [employer], at baseline, but later on in afternoon, when he appeared confused, not knowing where he was, where he kept his keys, also felt weak on the left arm and leg and family felt he was slurring his words and words were difficult to come out. Episode lasted till the evening when he went to [facility], was seen as a code stroke but was deemed not to be tenecteplase candidate due to being on eliquis. As per patient, last time he took eliquis was  AM. Patient was transported to [facility] for further stroke workup and management as per family request. At [facility], patient feels he is back to his normal strength and speech but has chronic issues with his speech since stroke in  and sees speech therapy for the same; feels his strength is back to normal baseline on his left side. He denies any acute changes in vision, denies any acute numbness, denies any headache, dizziness, denies any recent head injury.     During stroke admission in , p/w L hemiparesis, dysarthria, found to have R M1 occlusion, thrombectomy s/p TICI 2A, mechanism hypercoagulability 2/2 cardiac amyloidosis, on eliquis 5mg BID since. At baseline he doesn't need assistance to walk.  (12 May 2025 02:18)    PMH:   Osteoarthritis of hip    ED (erectile dysfunction)    Lumbar spondylolysis      PSH:   History of hip surgery      Medications:   acetaminophen     Tablet .. 650 milliGRAM(s) Oral every 6 hours PRN  apixaban 5 milliGRAM(s) Oral every 12 hours  atorvastatin 10 milliGRAM(s) Oral at bedtime  dapagliflozin 10 milliGRAM(s) Oral daily  lacosamide 50 milliGRAM(s) Oral two times a day  melatonin 3 milliGRAM(s) Oral at bedtime PRN  polyethylene glycol 3350 17 Gram(s) Oral daily  spironolactone 25 milliGRAM(s) Oral daily  Tafamidis (Vyndamax) Capsules 61 milliGRAM(s) 61 milliGRAM(s) Oral daily    Allergies:  No Known Allergies    FAMILY HISTORY:    Social History:  Smoking:  Alcohol:  Drugs:    Review of Systems:    Constitutional: No fever, chills, fatigue, or changes in weight  HEENT: No blurry vision, eye pain, headache, runny nose, or sore throat  Respiratory: No shortness of breath, cough, or wheezing  Cardiovascular: No chest pain or palpitations  Gastrointestinal: No nausea, vomiting, diarrhea, or abdominal pain  Genitourinary: No dysuria or incontinence  Extremities: No lower extremity swelling  Neurologic: +LUE weakness and numbness  Lymphatic: No lymphadenopathy   Skin: No rash  Psychiatry: No anxiety or depression  10 point review of systems is otherwise negative except as mentioned above            Physical Exam:  T(C): 36.7 (25 @ 11:38), Max: 37.1 (25 @ 21:08)  HR: 71 (25 @ 11:38) (64 - 75)  BP: 105/67 (25 @ 11:38) (102/67 - 113/72)  RR: 18 (25 @ 11:38) (18 - 18)  SpO2: 98% (25 @ 11:38) (95% - 98%)  Wt(kg): --     @ 07:01  -   @ 07:00  --------------------------------------------------------  IN: 750 mL / OUT: 0 mL / NET: 750 mL      Daily     Daily     Appearance: Normal, well groomed, NAD  Eyes: PERRLA, EOMI, pink conjunctiva, no scleral icterus   HENT: Normal oral mucosa  Cardiovascular: RRR, S1, S2, no murmur, rub, or gallop; no edema; no JVD  Respiratory: Clear to auscultation bilaterally  Gastrointestinal: Soft, non-tender, non-distended, BS+  Musculoskeletal: No clubbing or joint deformity   Neurologic: No focal weakness  Lymphatic: No lymphadenopathy  Psychiatry: AAOx3 with appropriate mood and affect  Skin: No rashes, ecchymoses, or cyanosis    Cardiovascular Diagnostic Testing:  ECG:     Echo:  < from: TTE W or WO Ultrasound Enhancing Agent (25 @ 08:41) >  CONCLUSIONS:      1. Left ventricular cavity is normal in size. Left ventricular wall thickness is normal. Left ventricular systolic function is mildly decreasedwith an ejection fraction of 49 % by 3D. Global left ventricular hypokinesis.   2. Left ventricular global longitudinal strain is -17.2 % is borderline (range: -18 to -16%). Images were acquired on a Acrisure ultrasound system and processed using Page[employer] strain analysis software with a heart rate of 61 bpm and a blood pressure of 102/67 mmHg.   3. There is mild (grade 1) left ventricular diastolic dysfunction, with normal left ventricular filling pressure.   4. Mildly enlarged right ventricular cavity size, with normal wall thickness, and normal right ventricular systolic function.   5. Left atrium is mildly dilated.   6. The right atrium is dilated.   7. No pericardial effusion seen.   8. Compared to the transthoracic echocardiogram performedon 2025, there have been no significant interval changes.      < end of copied text >      Stress Testing:    Cath:    Interpretation of Telemetry:    Imaging:    Labs:                        15.0   9.02  )-----------( 254      ( 13 May 2025 06:09 )             46.7     05-    138  |  104  |  14  ----------------------------<  76  4.2   |  21[L]  |  1.07    Ca    9.2      13 May 2025 06:08  Phos  3.5     05-  Mg     2.0     -    TPro  6.6  /  Alb  3.6  /  TBili  1.3[H]  /  DBili  x   /  AST  33  /  ALT  21  /  AlkPhos  125[H]  05-12    PT/INR - ( 12 May 2025 01:19 )   PT: 11.8 sec;   INR: 1.03 ratio         PTT - ( 12 May 2025 01:19 )  PTT:27.5 sec        Total Cholesterol: 135  LDL: --  HDL: 69  T

## 2025-05-13 NOTE — DISCHARGE NOTE PROVIDER - CARE PROVIDERS DIRECT ADDRESSES
[email or web address]

## 2025-05-13 NOTE — DISCHARGE NOTE PROVIDER - CARE PROVIDER_API CALL
Sarah Damian  NP in [address], [facility] [address]  [place] [address]  Phone: [phone]  Fax: [phone]  Follow Up Time: 2 weeks    Brandy Llamas  NP in [address], [facility] [address]  [place] [address]  Phone: [phone]  Fax: [phone]  Follow Up Time: 2 weeks

## 2025-05-13 NOTE — DISCHARGE NOTE PROVIDER - HOSPITAL COURSE
64yo male with cardiac amyloidosis, acute ischemic stroke in 2023 mild residual left arm weakness and dysarthria/aphasia, on Eliquis for secondary stroke prevention i/s/o amyloidosis, presented w worsening left sided weakness and slurred speech. As per patient, LKW 1200 5/11, when he was at [employer], at baseline, but later on in afternoon, when he appeared confused, not knowing where he was, where he kept his keys, also felt weak on the left arm and leg and family felt he was slurring his words and words were difficult to come out. Episode lasted till the evening when he went to [facility], was seen as a code stroke but was deemed not to be tenecteplase candidate due to being on Eliquis. Patient was transported to [facility] for further stroke workup and management as per family request. At [facility], patient feels he is back to his normal strength and speech but has chronic issues with his speech since stroke in 2023 and sees speech therapy for the same; feels his strength is back to normal baseline on his left side. During stroke admission in 2023, p/w L hemiparesis, dysarthria, found to have R M1 occlusion, thrombectomy s/p TICI 2A, mechanism hypercoagulability 2/2 cardiac amyloidosis, on Eliquis 5mg BID since.    Impression: Worsening L hemiparesis, dysarthria in the setting of an episode of LUE/LLE shaking. Likely focal seizure in the setting of prior R MCA infact with postictal Johnny's paralysis. MRI Brain negative for acute infarct.     NEURO: Neurologically improved during admission, now at neurologic baseline. Pt describes episode of LUE/LLE shaking, seemed rhythmic, per pt could not suppress movements. Episode concerning for focal seizure. Will plan to start Vimpat 50mg BID with epilepsy follow up. A1C 4.3, LDL 50 - continue home dose statin as LDL within goal.     ANTITHROMBOTIC THERAPY: Eliquis 5mg BID    CARDIOVASCULAR: Cardiac monitoring without events. Continue home medications.                 PT/OT evaluated the patient and recommended Acute rehab, however the patient opted to return home with home PT. He was cleared for discharge by the Neurology attending and will follow up as an outpatient.     Imaging:    CT Head 5/12/25:  No acute intracranial hemorrhage, mass effect, or midline shift. If clinical symptoms persist or worsen, more sensitive evaluation with brain MRI may be obtained, if no contraindications exist.  Chronic right MCA infarct.    CTA Head/Neck 5/12/25:    CTA NECK:  No evidence of significant stenosis or occlusion.    CTA HEAD:  No large vessel occlusion, significant stenosis or vascular abnormality   identified.    Findings were discussed with Dr. Stinson 5/12/2025 1:24 AM by Dr. Jareth Guerrero with read back confirmation.    MR Brain 5/12/25:  No midline shift, acute intracranial hemorrhage or evidence   of acute cerebral ischemia. Chronic right frontal, parietal, and temporal   infarct. There are patchy and confluent foci of hyperintense T2 signal   within the subcortical and periventricular white matter which are   nonspecific but likely related to chronic microvascular ischemic disease.

## 2025-05-13 NOTE — CONSULT NOTE ADULT - ASSESSMENT
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   Neurology team suspects     Amyloidosis is a hypercoagulable state. Continue anticoagulation with Eliquis 5 mg BID.     Discharge planning per Stroke team.  Continue stabilizer and silencer therapy.    Follow-up as outpatient.

## 2025-05-13 NOTE — DISCHARGE NOTE PROVIDER - NSDCMRMEDTOKEN_GEN_ALL_CORE_FT
Outreach attempt was made to schedule a Medicare Wellness Visit. This was the first attempt. Contact was not made, left message.   
Patient had MWV in 2024, please call to schedule an appointment.  
apixaban 5 mg oral tablet: 1 tab(s) orally 2 times a day  atorvastatin 10 mg oral tablet: 1 tab(s) orally once a day (at bedtime)  Jardiance 10 mg oral tablet: 1 tab(s) orally once a day  melatonin 3 mg oral tablet: 1 tab(s) orally once a day (at bedtime) As needed Insomnia  polyethylene glycol 3350 oral powder for reconstitution: 17 gram(s) orally once a day  senna leaf extract oral tablet: 2 tab(s) orally once a day (at bedtime)  spironolactone 25 mg oral tablet: 1 tab(s) orally once a day  Vimpat 50 mg oral tablet: 1 tab(s) orally 2 times a day MDD: 100mg  Vyndamax 61 mg oral capsule: 1 cap(s) orally once a day

## 2025-05-13 NOTE — PROGRESS NOTE ADULT - NS ATTEND AMEND GEN_ALL_CORE FT
Thirty five minutes of discharge time was spent on patient exam and discussion including test results, pathophysiology, natural history, stroke risk factors, medication changes and secondary prophylaxis and importance of medication compliance. We also discussed follow up plan. This was discussed with patient/family, who expresses understanding. MRI (-) for infarction this time, pt adds while he was at [facility] ED, his LUE and LLE was twitching rhythmically, until ED gave him a medicine that they told him will "make it go away but will make him sleepy". Since then he's been having weakness that is now improving. Clinically consistent with focal sz stemming from prior insult (stroke). Start lacosamide, f up outpt in neuro clinic.

## 2025-05-13 NOTE — PROGRESS NOTE ADULT - SUBJECTIVE AND OBJECTIVE BOX
THE PATIENT WAS SEEN AND EXAMINED BY ME WITH THE HOUSESTAFF AND STROKE TEAM DURING MORNING ROUNDS.     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 As per patient, LKW 1200 5/11, when he was at [employer], at baseline, but later on in afternoon, when he appeared confused, not knowing where he was, where he kept his keys, also felt weak on the left arm and leg and family felt he was slurring his words and words were difficult to come out. Episode lasted till the evening when he went to [facility], was seen as a code stroke but was deemed not to be tenecteplase candidate due to being on eliquis. As per patient, last time he took eliquis was 5/11 AM. Patient was transported to [facility] for further stroke workup and management as per family request. At [facility], patient feels he is back to his normal strength and speech but has chronic issues with his speech since stroke in 2023 and sees speech therapy for the same; feels his strength is back to normal baseline on his left side. He denies any acute changes in vision, denies any acute numbness, denies any headache, dizziness, denies any recent head injury. During stroke admission in 2023, p/w L hemiparesis, dysarthria, found to have R M1 occlusion, thrombectomy s/p TICI 2A, mechanism hypercoagulability 2/2 cardiac amyloidosis, on eliquis 5mg BID since. At baseline he doesn't need assistance to walk.  (12 May 2025 02:18)    SUBJECTIVE: No events overnight.  No new neurologic complaints.      acetaminophen     Tablet .. 650 milliGRAM(s) Oral every 6 hours PRN  apixaban 5 milliGRAM(s) Oral every 12 hours  atorvastatin 10 milliGRAM(s) Oral at bedtime  dapagliflozin 10 milliGRAM(s) Oral daily  melatonin 3 milliGRAM(s) Oral at bedtime PRN  polyethylene glycol 3350 17 Gram(s) Oral daily  spironolactone 25 milliGRAM(s) Oral daily  Tafamidis (Vyndamax) Capsules 61 milliGRAM(s) 61 milliGRAM(s) Oral daily    PHYSICAL EXAM:   Vital Signs Last 24 Hrs  T(C): 36.9 (13 May 2025 04:46), Max: 37.1 (12 May 2025 21:08)  T(F): 98.5 (13 May 2025 04:46), Max: 98.7 (12 May 2025 21:08)  HR: 75 (13 May 2025 04:46) (62 - 75)  BP: 102/67 (13 May 2025 04:46) (100/63 - 121/71)  BP(mean): --  RR: 18 (13 May 2025 04:46) (18 - 19)  SpO2: 95% (13 May 2025 04:46) (95% - 96%)    Parameters below as of 13 May 2025 04:46  Patient On (Oxygen Delivery Method): room air    **INCOMPLETE EXAM**  General: No acute distress  Abdomen: Soft, nontender, nondistended   Extremities: No edema    NEUROLOGICAL EXAM:  Mental status: Awake, alert, oriented x3, no aphasia, no neglect, normal memory   Cranial Nerves: No facial asymmetry, no nystagmus, no dysarthria,  tongue midline  Motor exam: Normal tone, no drift, 5/5 RUE, 5/5 RLE, 5/5 LUE, 5/5 LLE, normal fine finger movements.  Sensation: Intact to light touch   Coordination/ Gait: No dysmetria, CLAUDIA intact and symmetric bilaterally    LABS:                        15.0   9.02  )-----------( 254      ( 13 May 2025 06:09 )             46.7    05-13    138  |  104  |  14  ----------------------------<  76  4.2   |  21[L]  |  1.07    Ca    9.2      13 May 2025 06:08    TPro  6.6  /  Alb  3.6  /  TBili  1.3[H]  /  DBili  x   /  AST  33  /  ALT  21  /  AlkPhos  125[H]  05-12  PT/INR - ( 12 May 2025 01:19 )   PT: 11.8 sec;   INR: 1.03 ratio       PTT - ( 12 May 2025 01:19 )  PTT:27.5 sec    IMAGING: Reviewed by me.     CT Head 5/12/25:  No acute intracranial hemorrhage, mass effect, or midline shift. If clinical symptoms persist or worsen, more sensitive evaluation with brain MRI may be obtained, if no contraindications exist.  Chronic right MCA infarct.    CTA Head/Neck 5/12/25:    CTA NECK:  No evidence of significant stenosis or occlusion.    CTA HEAD:  No large vessel occlusion, significant stenosis or vascular abnormality   identified.    Findings were discussed with Dr. Stinson 5/12/2025 1:24 AM by Dr. Jareth Guerrero with read back confirmation.    MR Brain 5/12/25:  No midline shift, acute intracranial hemorrhage or evidence   of acute cerebral ischemia. Chronic right frontal, parietal, and temporal   infarct. There are patchy and confluent foci of hyperintense T2 signal   within the subcortical and periventricular white matter which are   nonspecific but likely related to chronic microvascular ischemic disease. THE PATIENT WAS SEEN AND EXAMINED BY ME WITH THE HOUSESTAFF AND STROKE TEAM DURING MORNING ROUNDS.     HPI:  66yo RH M w PNH of cardiac amyloidosis, acute ischemic stroke in 2023 mild residual left arm weakness and dysarthria/aphasia, on eliquis for secondary stroke prevention i/s/o amyloidosis, comes in w worsening left sided weakness and slurred speech. As per patient, LKW 1200 5/11, when he was at [employer], at baseline, but later on in afternoon, when he appeared confused, not knowing where he was, where he kept his keys, also felt weak on the left arm and leg and family felt he was slurring his words and words were difficult to come out. Episode lasted till the evening when he went to [facility], was seen as a code stroke but was deemed not to be tenecteplase candidate due to being on eliquis. As per patient, last time he took eliquis was 5/11 AM. Patient was transported to [facility] for further stroke workup and management as per family request. At [facility], patient feels he is back to his normal strength and speech but has chronic issues with his speech since stroke in 2023 and sees speech therapy for the same; feels his strength is back to normal baseline on his left side. He denies any acute changes in vision, denies any acute numbness, denies any headache, dizziness, denies any recent head injury. During stroke admission in 2023, p/w L hemiparesis, dysarthria, found to have R M1 occlusion, thrombectomy s/p TICI 2A, mechanism hypercoagulability 2/2 cardiac amyloidosis, on eliquis 5mg BID since. At baseline he doesn't need assistance to walk.  (12 May 2025 02:18)    SUBJECTIVE: No events overnight.  No new neurologic complaints.      acetaminophen     Tablet .. 650 milliGRAM(s) Oral every 6 hours PRN  apixaban 5 milliGRAM(s) Oral every 12 hours  atorvastatin 10 milliGRAM(s) Oral at bedtime  dapagliflozin 10 milliGRAM(s) Oral daily  melatonin 3 milliGRAM(s) Oral at bedtime PRN  polyethylene glycol 3350 17 Gram(s) Oral daily  spironolactone 25 milliGRAM(s) Oral daily  Tafamidis (Vyndamax) Capsules 61 milliGRAM(s) 61 milliGRAM(s) Oral daily    PHYSICAL EXAM:   Vital Signs Last 24 Hrs  T(C): 36.9 (13 May 2025 04:46), Max: 37.1 (12 May 2025 21:08)  T(F): 98.5 (13 May 2025 04:46), Max: 98.7 (12 May 2025 21:08)  HR: 75 (13 May 2025 04:46) (62 - 75)  BP: 102/67 (13 May 2025 04:46) (100/63 - 121/71)  BP(mean): --  RR: 18 (13 May 2025 04:46) (18 - 19)  SpO2: 95% (13 May 2025 04:46) (95% - 96%)    Parameters below as of 13 May 2025 04:46  Patient On (Oxygen Delivery Method): room air    **INCOMPLETE EXAM**  General: No acute distress  Abdomen: Soft, nontender, nondistended   Extremities: No edema    NEUROLOGICAL EXAM:  Mental status: A&Ox3, no aphasia, no neglect, normal memory   Cranial Nerves: No facial asymmetry, no nystagmus, no dysarthria  Motor exam: Normal tone, no drift, 5/5 RUE, 5/5 RLE, 5/5 LUE, 5/5 LLE  Sensation: Intact to light touch   Coordination/ Gait: Deferred    LABS:                        15.0   9.02  )-----------( 254      ( 13 May 2025 06:09 )             46.7    05-13    138  |  104  |  14  ----------------------------<  76  4.2   |  21[L]  |  1.07    Ca    9.2      13 May 2025 06:08    TPro  6.6  /  Alb  3.6  /  TBili  1.3[H]  /  DBili  x   /  AST  33  /  ALT  21  /  AlkPhos  125[H]  05-12  PT/INR - ( 12 May 2025 01:19 )   PT: 11.8 sec;   INR: 1.03 ratio       PTT - ( 12 May 2025 01:19 )  PTT:27.5 sec    IMAGING: Reviewed by me.     CT Head 5/12/25:  No acute intracranial hemorrhage, mass effect, or midline shift. If clinical symptoms persist or worsen, more sensitive evaluation with brain MRI may be obtained, if no contraindications exist.  Chronic right MCA infarct.    CTA Head/Neck 5/12/25:    CTA NECK:  No evidence of significant stenosis or occlusion.    CTA HEAD:  No large vessel occlusion, significant stenosis or vascular abnormality   identified.    Findings were discussed with Dr. Stinson 5/12/2025 1:24 AM by Dr. Jareth Guerrero with read back confirmation.    MR Brain 5/12/25:  No midline shift, acute intracranial hemorrhage or evidence   of acute cerebral ischemia. Chronic right frontal, parietal, and temporal   infarct. There are patchy and confluent foci of hyperintense T2 signal   within the subcortical and periventricular white matter which are   nonspecific but likely related to chronic microvascular ischemic disease. THE PATIENT WAS SEEN AND EXAMINED BY ME WITH THE HOUSESTAFF AND STROKE TEAM DURING MORNING ROUNDS.     HPI:  66yo RH M w PNH of cardiac amyloidosis, acute ischemic stroke in 2023 mild residual left arm weakness and dysarthria/aphasia, on eliquis for secondary stroke prevention i/s/o amyloidosis, comes in w worsening left sided weakness and slurred speech. As per patient, LKW 1200 5/11, when he was at [employer], at baseline, but later on in afternoon, when he appeared confused, not knowing where he was, where he kept his keys, also felt weak on the left arm and leg and family felt he was slurring his words and words were difficult to come out. Episode lasted till the evening when he went to [facility], was seen as a code stroke but was deemed not to be tenecteplase candidate due to being on eliquis. As per patient, last time he took eliquis was 5/11 AM. Patient was transported to [facility] for further stroke workup and management as per family request. At [facility], patient feels he is back to his normal strength and speech but has chronic issues with his speech since stroke in 2023 and sees speech therapy for the same; feels his strength is back to normal baseline on his left side. He denies any acute changes in vision, denies any acute numbness, denies any headache, dizziness, denies any recent head injury. During stroke admission in 2023, p/w L hemiparesis, dysarthria, found to have R M1 occlusion, thrombectomy s/p TICI 2A, mechanism hypercoagulability 2/2 cardiac amyloidosis, on eliquis 5mg BID since. At baseline he doesn't need assistance to walk.  (12 May 2025 02:18)    SUBJECTIVE: No events overnight.  No new neurologic complaints.      acetaminophen     Tablet .. 650 milliGRAM(s) Oral every 6 hours PRN  apixaban 5 milliGRAM(s) Oral every 12 hours  atorvastatin 10 milliGRAM(s) Oral at bedtime  dapagliflozin 10 milliGRAM(s) Oral daily  melatonin 3 milliGRAM(s) Oral at bedtime PRN  polyethylene glycol 3350 17 Gram(s) Oral daily  spironolactone 25 milliGRAM(s) Oral daily  Tafamidis (Vyndamax) Capsules 61 milliGRAM(s) 61 milliGRAM(s) Oral daily    PHYSICAL EXAM:   Vital Signs Last 24 Hrs  T(C): 36.9 (13 May 2025 04:46), Max: 37.1 (12 May 2025 21:08)  T(F): 98.5 (13 May 2025 04:46), Max: 98.7 (12 May 2025 21:08)  HR: 75 (13 May 2025 04:46) (62 - 75)  BP: 102/67 (13 May 2025 04:46) (100/63 - 121/71)  BP(mean): --  RR: 18 (13 May 2025 04:46) (18 - 19)  SpO2: 95% (13 May 2025 04:46) (95% - 96%)    Parameters below as of 13 May 2025 04:46  Patient On (Oxygen Delivery Method): room air    General: No acute distress  Abdomen: Soft, nontender, nondistended   Extremities: No edema    NEUROLOGICAL EXAM:  Mental status: A&Ox3, no aphasia, no neglect, normal memory   Cranial Nerves: No facial asymmetry, no nystagmus, no dysarthria  Motor exam: Normal tone, no drift, 5/5 RUE, 5/5 RLE, 5/5 LUE, 5/5 LLE  Sensation: Intact to light touch   Coordination/ Gait: Deferred    LABS:                        15.0   9.02  )-----------( 254      ( 13 May 2025 06:09 )             46.7    05-13    138  |  104  |  14  ----------------------------<  76  4.2   |  21[L]  |  1.07    Ca    9.2      13 May 2025 06:08    TPro  6.6  /  Alb  3.6  /  TBili  1.3[H]  /  DBili  x   /  AST  33  /  ALT  21  /  AlkPhos  125[H]  05-12  PT/INR - ( 12 May 2025 01:19 )   PT: 11.8 sec;   INR: 1.03 ratio       PTT - ( 12 May 2025 01:19 )  PTT:27.5 sec    IMAGING: Reviewed by me.     CT Head 5/12/25:  No acute intracranial hemorrhage, mass effect, or midline shift. If clinical symptoms persist or worsen, more sensitive evaluation with brain MRI may be obtained, if no contraindications exist.  Chronic right MCA infarct.    CTA Head/Neck 5/12/25:    CTA NECK:  No evidence of significant stenosis or occlusion.    CTA HEAD:  No large vessel occlusion, significant stenosis or vascular abnormality   identified.    Findings were discussed with Dr. Stinson 5/12/2025 1:24 AM by Dr. Jareth Guerrero with read back confirmation.    MR Brain 5/12/25:  No midline shift, acute intracranial hemorrhage or evidence   of acute cerebral ischemia. Chronic right frontal, parietal, and temporal   infarct. There are patchy and confluent foci of hyperintense T2 signal   within the subcortical and periventricular white matter which are   nonspecific but likely related to chronic microvascular ischemic disease.

## 2025-05-13 NOTE — DISCHARGE NOTE PROVIDER - PROVIDER TOKENS
PROVIDER:[TOKEN:[[id number]],FOLLOWUP:[2 weeks]],PROVIDER:[TOKEN:[[id number]],FOLLOWUP:[2 weeks]]

## 2025-05-16 ENCOUNTER — APPOINTMENT (OUTPATIENT)
Dept: INTERNAL MEDICINE | Facility: CLINIC | Age: 65
End: 2025-05-16
Payer: MEDICAID

## 2025-05-16 VITALS
HEART RATE: 56 BPM | WEIGHT: 168 LBS | DIASTOLIC BLOOD PRESSURE: 75 MMHG | SYSTOLIC BLOOD PRESSURE: 117 MMHG | TEMPERATURE: 96.2 F | OXYGEN SATURATION: 98 % | HEIGHT: 68 IN | BODY MASS INDEX: 25.46 KG/M2

## 2025-05-16 DIAGNOSIS — Z09 ENCOUNTER FOR FOLLOW-UP EXAMINATION AFTER COMPLETED TREATMENT FOR CONDITIONS OTHER THAN MALIGNANT NEOPLASM: ICD-10-CM

## 2025-05-16 DIAGNOSIS — E85.89 OTHER AMYLOIDOSIS: ICD-10-CM

## 2025-05-16 DIAGNOSIS — I50.20 UNSPECIFIED SYSTOLIC (CONGESTIVE) HEART FAILURE: ICD-10-CM

## 2025-05-16 DIAGNOSIS — I63.9 CEREBRAL INFARCTION, UNSPECIFIED: ICD-10-CM

## 2025-05-16 PROCEDURE — 99496 TRANSJ CARE MGMT HIGH F2F 7D: CPT

## 2025-05-22 ENCOUNTER — APPOINTMENT (OUTPATIENT)
Dept: NEUROLOGY | Facility: CLINIC | Age: 65
End: 2025-05-22
Payer: MEDICAID

## 2025-05-22 ENCOUNTER — NON-APPOINTMENT (OUTPATIENT)
Age: 65
End: 2025-05-22

## 2025-05-22 VITALS
DIASTOLIC BLOOD PRESSURE: 71 MMHG | BODY MASS INDEX: 25.16 KG/M2 | HEIGHT: 68 IN | WEIGHT: 166 LBS | SYSTOLIC BLOOD PRESSURE: 115 MMHG | HEART RATE: 60 BPM

## 2025-05-22 DIAGNOSIS — I69.398 OTHER SEQUELAE OF CEREBRAL INFARCTION: ICD-10-CM

## 2025-05-22 DIAGNOSIS — R56.9 OTHER SEQUELAE OF CEREBRAL INFARCTION: ICD-10-CM

## 2025-05-22 PROCEDURE — 99203 OFFICE O/P NEW LOW 30 MIN: CPT

## 2025-05-22 PROCEDURE — G2211 COMPLEX E/M VISIT ADD ON: CPT | Mod: NC

## 2025-05-22 PROCEDURE — 95816 EEG AWAKE AND DROWSY: CPT

## 2025-05-22 RX ORDER — LEVETIRACETAM 500 MG/1
500 TABLET, FILM COATED, EXTENDED RELEASE ORAL
Qty: 60 | Refills: 5 | Status: ACTIVE | COMMUNITY
Start: 2025-05-22 | End: 1900-01-01

## 2025-05-27 ENCOUNTER — APPOINTMENT (OUTPATIENT)
Dept: NEUROLOGY | Facility: CLINIC | Age: 65
End: 2025-05-27
Payer: MEDICAID

## 2025-05-27 ENCOUNTER — RX RENEWAL (OUTPATIENT)
Age: 65
End: 2025-05-27

## 2025-05-27 VITALS
DIASTOLIC BLOOD PRESSURE: 66 MMHG | BODY MASS INDEX: 25.01 KG/M2 | SYSTOLIC BLOOD PRESSURE: 112 MMHG | HEIGHT: 68 IN | HEART RATE: 60 BPM | WEIGHT: 165 LBS

## 2025-05-27 DIAGNOSIS — I63.9 CEREBRAL INFARCTION, UNSPECIFIED: ICD-10-CM

## 2025-05-27 PROCEDURE — 99214 OFFICE O/P EST MOD 30 MIN: CPT

## 2025-05-27 PROCEDURE — G2211 COMPLEX E/M VISIT ADD ON: CPT | Mod: NC

## 2025-05-27 RX ORDER — EMPAGLIFLOZIN 10 MG/1
10 TABLET, FILM COATED ORAL DAILY
Refills: 0 | Status: ACTIVE | COMMUNITY

## 2025-05-28 ENCOUNTER — APPOINTMENT (OUTPATIENT)
Dept: INTERNAL MEDICINE | Facility: CLINIC | Age: 65
End: 2025-05-28

## 2025-06-12 ENCOUNTER — APPOINTMENT (OUTPATIENT)
Dept: NEUROLOGY | Facility: CLINIC | Age: 65
End: 2025-06-12

## 2025-07-11 ENCOUNTER — APPOINTMENT (OUTPATIENT)
Dept: NEUROLOGY | Facility: CLINIC | Age: 65
End: 2025-07-11

## 2025-07-11 PROCEDURE — 95816 EEG AWAKE AND DROWSY: CPT

## 2025-07-12 PROCEDURE — 95719 EEG PHYS/QHP EA INCR W/O VID: CPT

## 2025-07-12 PROCEDURE — 95708 EEG WO VID EA 12-26HR UNMNTR: CPT

## 2025-07-12 PROCEDURE — 95700 EEG CONT REC W/VID EEG TECH: CPT

## 2025-08-01 ENCOUNTER — APPOINTMENT (OUTPATIENT)
Dept: INTERNAL MEDICINE | Facility: CLINIC | Age: 65
End: 2025-08-01

## 2025-09-17 ENCOUNTER — APPOINTMENT (OUTPATIENT)
Dept: INTERNAL MEDICINE | Facility: CLINIC | Age: 65
End: 2025-09-17
Payer: MEDICAID

## 2025-09-17 VITALS
DIASTOLIC BLOOD PRESSURE: 64 MMHG | HEART RATE: 52 BPM | HEIGHT: 68 IN | BODY MASS INDEX: 25.91 KG/M2 | WEIGHT: 171 LBS | SYSTOLIC BLOOD PRESSURE: 115 MMHG | TEMPERATURE: 97.4 F | OXYGEN SATURATION: 97 %

## 2025-09-17 DIAGNOSIS — Z13.228 ENCOUNTER FOR SCREENING FOR OTHER METABOLIC DISORDERS: ICD-10-CM

## 2025-09-17 DIAGNOSIS — I63.9 CEREBRAL INFARCTION, UNSPECIFIED: ICD-10-CM

## 2025-09-17 DIAGNOSIS — I50.20 UNSPECIFIED SYSTOLIC (CONGESTIVE) HEART FAILURE: ICD-10-CM

## 2025-09-17 DIAGNOSIS — I48.92 UNSPECIFIED ATRIAL FLUTTER: ICD-10-CM

## 2025-09-17 DIAGNOSIS — Z00.00 ENCOUNTER FOR GENERAL ADULT MEDICAL EXAMINATION W/OUT ABNORMAL FINDINGS: ICD-10-CM

## 2025-09-17 DIAGNOSIS — Z12.11 ENCOUNTER FOR SCREENING FOR MALIGNANT NEOPLASM OF COLON: ICD-10-CM

## 2025-09-17 PROCEDURE — 99396 PREV VISIT EST AGE 40-64: CPT | Mod: 25

## 2025-09-22 LAB
25(OH)D3 SERPL-MCNC: 29.1 NG/ML
ALBUMIN SERPL ELPH-MCNC: 4 G/DL
ALP BLD-CCNC: 128 U/L
ALT SERPL-CCNC: 39 U/L
ANION GAP SERPL CALC-SCNC: 14 MMOL/L
APPEARANCE: CLEAR
AST SERPL-CCNC: 37 U/L
BASOPHILS # BLD AUTO: 0.07 K/UL
BASOPHILS NFR BLD AUTO: 0.8 %
BILIRUB SERPL-MCNC: 0.9 MG/DL
BILIRUBIN URINE: NEGATIVE
BLOOD URINE: NEGATIVE
BUN SERPL-MCNC: 22 MG/DL
CALCIUM SERPL-MCNC: 9.6 MG/DL
CHLORIDE SERPL-SCNC: 100 MMOL/L
CHOLEST SERPL-MCNC: 148 MG/DL
CO2 SERPL-SCNC: 21 MMOL/L
COLOR: YELLOW
CREAT SERPL-MCNC: 1.05 MG/DL
EGFRCR SERPLBLD CKD-EPI 2021: 79 ML/MIN/1.73M2
EOSINOPHIL # BLD AUTO: 0.24 K/UL
EOSINOPHIL NFR BLD AUTO: 2.8 %
ESTIMATED AVERAGE GLUCOSE: 82 MG/DL
FERRITIN SERPL-MCNC: 44 NG/ML
FOLATE SERPL-MCNC: 8.7 NG/ML
GLUCOSE QUALITATIVE U: >=1000 MG/DL
GLUCOSE SERPL-MCNC: 84 MG/DL
HBA1C MFR BLD HPLC: 4.5 %
HCT VFR BLD CALC: 47.6 %
HDLC SERPL-MCNC: 72 MG/DL
HGB BLD-MCNC: 15.1 G/DL
IMM GRANULOCYTES NFR BLD AUTO: 0.4 %
IRON SATN MFR SERPL: 23 %
IRON SERPL-MCNC: 71 UG/DL
KETONES URINE: NEGATIVE MG/DL
LDLC SERPL-MCNC: 63 MG/DL
LEUKOCYTE ESTERASE URINE: NEGATIVE
LYMPHOCYTES # BLD AUTO: 1.98 K/UL
LYMPHOCYTES NFR BLD AUTO: 23.4 %
MAGNESIUM SERPL-MCNC: 2 MG/DL
MAN DIFF?: NORMAL
MCHC RBC-ENTMCNC: 30.3 PG
MCHC RBC-ENTMCNC: 31.7 G/DL
MCV RBC AUTO: 95.4 FL
MONOCYTES # BLD AUTO: 0.61 K/UL
MONOCYTES NFR BLD AUTO: 7.2 %
NEUTROPHILS # BLD AUTO: 5.52 K/UL
NEUTROPHILS NFR BLD AUTO: 65.4 %
NITRITE URINE: NEGATIVE
NONHDLC SERPL-MCNC: 75 MG/DL
NT-PROBNP SERPL-MCNC: 43 PG/ML
PH URINE: 5.5
PLATELET # BLD AUTO: 277 K/UL
POTASSIUM SERPL-SCNC: 4.3 MMOL/L
PROT SERPL-MCNC: 6.8 G/DL
PROTEIN URINE: NEGATIVE MG/DL
PSA SERPL-MCNC: 3.31 NG/ML
RBC # BLD: 4.99 M/UL
RBC # FLD: 13.1 %
SODIUM SERPL-SCNC: 135 MMOL/L
SPECIFIC GRAVITY URINE: 1.02
TIBC SERPL-MCNC: 314 UG/DL
TRIGL SERPL-MCNC: 56 MG/DL
TSH SERPL-ACNC: 2.3 UIU/ML
UIBC SERPL-MCNC: 243 UG/DL
UROBILINOGEN URINE: 0.2 MG/DL
VIT B12 SERPL-MCNC: 955 PG/ML
WBC # FLD AUTO: 8.45 K/UL